# Patient Record
Sex: FEMALE | Race: WHITE | NOT HISPANIC OR LATINO | Employment: FULL TIME
[De-identification: names, ages, dates, MRNs, and addresses within clinical notes are randomized per-mention and may not be internally consistent; named-entity substitution may affect disease eponyms.]

---

## 2017-06-07 ENCOUNTER — RECORDS - HEALTHEAST (OUTPATIENT)
Dept: ADMINISTRATIVE | Facility: OTHER | Age: 52
End: 2017-06-07

## 2017-07-31 ENCOUNTER — OFFICE VISIT - HEALTHEAST (OUTPATIENT)
Dept: FAMILY MEDICINE | Facility: CLINIC | Age: 52
End: 2017-07-31

## 2017-07-31 DIAGNOSIS — F41.9 ANXIETY: ICD-10-CM

## 2017-07-31 DIAGNOSIS — K59.00 CONSTIPATION, UNSPECIFIED CONSTIPATION TYPE: ICD-10-CM

## 2017-07-31 DIAGNOSIS — K58.9 IRRITABLE BOWEL SYNDROME: ICD-10-CM

## 2017-07-31 ASSESSMENT — MIFFLIN-ST. JEOR: SCORE: 1721.1

## 2017-09-18 ENCOUNTER — OFFICE VISIT - HEALTHEAST (OUTPATIENT)
Dept: FAMILY MEDICINE | Facility: CLINIC | Age: 52
End: 2017-09-18

## 2017-09-18 DIAGNOSIS — E04.1 THYROID NODULE: ICD-10-CM

## 2017-09-18 DIAGNOSIS — Z23 NEED FOR IMMUNIZATION AGAINST INFLUENZA: ICD-10-CM

## 2017-09-18 DIAGNOSIS — R53.83 FATIGUE, UNSPECIFIED TYPE: ICD-10-CM

## 2017-09-18 DIAGNOSIS — Z12.31 VISIT FOR SCREENING MAMMOGRAM: ICD-10-CM

## 2017-09-18 ASSESSMENT — MIFFLIN-ST. JEOR: SCORE: 1721.1

## 2017-09-21 ENCOUNTER — COMMUNICATION - HEALTHEAST (OUTPATIENT)
Dept: FAMILY MEDICINE | Facility: CLINIC | Age: 52
End: 2017-09-21

## 2017-09-25 ENCOUNTER — COMMUNICATION - HEALTHEAST (OUTPATIENT)
Dept: FAMILY MEDICINE | Facility: CLINIC | Age: 52
End: 2017-09-25

## 2017-09-28 ENCOUNTER — HOSPITAL ENCOUNTER (OUTPATIENT)
Dept: MAMMOGRAPHY | Facility: CLINIC | Age: 52
Discharge: HOME | End: 2017-09-28
Attending: FAMILY MEDICINE

## 2017-09-28 DIAGNOSIS — Z12.31 VISIT FOR SCREENING MAMMOGRAM: ICD-10-CM

## 2017-10-17 ENCOUNTER — COMMUNICATION - HEALTHEAST (OUTPATIENT)
Dept: FAMILY MEDICINE | Facility: CLINIC | Age: 52
End: 2017-10-17

## 2017-11-15 ENCOUNTER — RECORDS - HEALTHEAST (OUTPATIENT)
Dept: ADMINISTRATIVE | Facility: OTHER | Age: 52
End: 2017-11-15

## 2018-01-03 ENCOUNTER — COMMUNICATION - HEALTHEAST (OUTPATIENT)
Dept: FAMILY MEDICINE | Facility: CLINIC | Age: 53
End: 2018-01-03

## 2018-01-11 ENCOUNTER — OFFICE VISIT - HEALTHEAST (OUTPATIENT)
Dept: ENDOCRINOLOGY | Facility: CLINIC | Age: 53
End: 2018-01-11

## 2018-01-11 DIAGNOSIS — E04.1 THYROID NODULE: ICD-10-CM

## 2018-01-11 LAB
CALCIUM SERPL-MCNC: 9.2 MG/DL (ref 8.5–10.5)
CHOLEST SERPL-MCNC: 222 MG/DL
CREAT SERPL-MCNC: 0.87 MG/DL (ref 0.6–1.1)
FASTING STATUS PATIENT QL REPORTED: YES
FASTING STATUS PATIENT QL REPORTED: YES
FSH SERPL-ACNC: 86.1 MIU/ML
GFR SERPL CREATININE-BSD FRML MDRD: >60 ML/MIN/1.73M2
GLUCOSE BLD-MCNC: 91 MG/DL (ref 70–125)
HBA1C MFR BLD: 5.3 % (ref 3.5–6)
HDLC SERPL-MCNC: 53 MG/DL
LDLC SERPL CALC-MCNC: 131 MG/DL
LH SERPL-ACNC: 51.8 MIU/ML
POTASSIUM BLD-SCNC: 4.7 MMOL/L (ref 3.5–5)
PTH-INTACT SERPL-MCNC: 83 PG/ML (ref 10–86)
T3 SERPL-MCNC: 88 NG/DL (ref 45–175)
T4 FREE SERPL-MCNC: 0.8 NG/DL (ref 0.7–1.8)
TRIGL SERPL-MCNC: 190 MG/DL
TSH SERPL DL<=0.005 MIU/L-ACNC: 1.58 UIU/ML (ref 0.3–5)

## 2018-01-11 ASSESSMENT — MIFFLIN-ST. JEOR: SCORE: 1741.97

## 2018-01-12 ENCOUNTER — HOSPITAL ENCOUNTER (OUTPATIENT)
Dept: ULTRASOUND IMAGING | Facility: HOSPITAL | Age: 53
Discharge: HOME | End: 2018-01-12
Attending: INTERNAL MEDICINE

## 2018-01-12 DIAGNOSIS — E04.1 THYROID NODULE: ICD-10-CM

## 2018-01-12 LAB
25(OH)D3 SERPL-MCNC: 36.4 NG/ML (ref 30–80)
25(OH)D3 SERPL-MCNC: 36.4 NG/ML (ref 30–80)

## 2018-01-15 ENCOUNTER — COMMUNICATION - HEALTHEAST (OUTPATIENT)
Dept: ENDOCRINOLOGY | Facility: CLINIC | Age: 53
End: 2018-01-15

## 2018-02-05 ENCOUNTER — COMMUNICATION - HEALTHEAST (OUTPATIENT)
Dept: FAMILY MEDICINE | Facility: CLINIC | Age: 53
End: 2018-02-05

## 2018-02-22 ENCOUNTER — RECORDS - HEALTHEAST (OUTPATIENT)
Dept: ADMINISTRATIVE | Facility: OTHER | Age: 53
End: 2018-02-22

## 2018-03-02 ENCOUNTER — RECORDS - HEALTHEAST (OUTPATIENT)
Dept: ADMINISTRATIVE | Facility: OTHER | Age: 53
End: 2018-03-02

## 2018-05-27 ENCOUNTER — RECORDS - HEALTHEAST (OUTPATIENT)
Dept: ADMINISTRATIVE | Facility: OTHER | Age: 53
End: 2018-05-27

## 2018-06-18 ENCOUNTER — COMMUNICATION - HEALTHEAST (OUTPATIENT)
Dept: FAMILY MEDICINE | Facility: CLINIC | Age: 53
End: 2018-06-18

## 2018-06-27 ENCOUNTER — COMMUNICATION - HEALTHEAST (OUTPATIENT)
Dept: LAB | Facility: CLINIC | Age: 53
End: 2018-06-27

## 2018-06-27 DIAGNOSIS — E04.9 GOITER: ICD-10-CM

## 2018-07-02 ENCOUNTER — AMBULATORY - HEALTHEAST (OUTPATIENT)
Dept: FAMILY MEDICINE | Facility: CLINIC | Age: 53
End: 2018-07-02

## 2018-07-02 DIAGNOSIS — E66.9 OBESITY: ICD-10-CM

## 2018-07-05 ENCOUNTER — AMBULATORY - HEALTHEAST (OUTPATIENT)
Dept: LAB | Facility: CLINIC | Age: 53
End: 2018-07-05

## 2018-07-05 DIAGNOSIS — E04.9 GOITER: ICD-10-CM

## 2018-07-05 LAB
25(OH)D3 SERPL-MCNC: 24.8 NG/ML (ref 30–80)
25(OH)D3 SERPL-MCNC: 24.8 NG/ML (ref 30–80)
CALCIUM SERPL-MCNC: 9.2 MG/DL (ref 8.5–10.5)
CREAT SERPL-MCNC: 0.9 MG/DL (ref 0.6–1.1)
GFR SERPL CREATININE-BSD FRML MDRD: >60 ML/MIN/1.73M2
HBA1C MFR BLD: 5.2 % (ref 3.5–6)
POTASSIUM BLD-SCNC: 4.4 MMOL/L (ref 3.5–5)
T4 FREE SERPL-MCNC: 0.8 NG/DL (ref 0.7–1.8)
TSH SERPL DL<=0.005 MIU/L-ACNC: 3.58 UIU/ML (ref 0.3–5)

## 2018-07-11 ENCOUNTER — COMMUNICATION - HEALTHEAST (OUTPATIENT)
Dept: FAMILY MEDICINE | Facility: CLINIC | Age: 53
End: 2018-07-11

## 2018-07-13 ENCOUNTER — OFFICE VISIT - HEALTHEAST (OUTPATIENT)
Dept: SURGERY | Facility: CLINIC | Age: 53
End: 2018-07-13

## 2018-07-13 DIAGNOSIS — Z71.3 DIETARY COUNSELING: ICD-10-CM

## 2018-07-13 DIAGNOSIS — E66.9 OBESITY (BMI 30-39.9): ICD-10-CM

## 2018-07-13 ASSESSMENT — MIFFLIN-ST. JEOR: SCORE: 1775.53

## 2018-07-16 ENCOUNTER — OFFICE VISIT - HEALTHEAST (OUTPATIENT)
Dept: FAMILY MEDICINE | Facility: CLINIC | Age: 53
End: 2018-07-16

## 2018-07-16 ENCOUNTER — COMMUNICATION - HEALTHEAST (OUTPATIENT)
Dept: FAMILY MEDICINE | Facility: CLINIC | Age: 53
End: 2018-07-16

## 2018-07-16 DIAGNOSIS — R53.83 FATIGUE, UNSPECIFIED TYPE: ICD-10-CM

## 2018-07-16 DIAGNOSIS — F51.01 PRIMARY INSOMNIA: ICD-10-CM

## 2018-07-16 DIAGNOSIS — F33.41 RECURRENT MAJOR DEPRESSIVE DISORDER, IN PARTIAL REMISSION (H): ICD-10-CM

## 2018-07-16 ASSESSMENT — MIFFLIN-ST. JEOR: SCORE: 1766.46

## 2018-07-17 ENCOUNTER — AMBULATORY - HEALTHEAST (OUTPATIENT)
Dept: FAMILY MEDICINE | Facility: CLINIC | Age: 53
End: 2018-07-17

## 2018-08-31 ENCOUNTER — OFFICE VISIT - HEALTHEAST (OUTPATIENT)
Dept: SURGERY | Facility: CLINIC | Age: 53
End: 2018-08-31

## 2018-08-31 DIAGNOSIS — Z71.3 DIETARY COUNSELING: ICD-10-CM

## 2018-08-31 DIAGNOSIS — E66.9 OBESITY (BMI 30-39.9): ICD-10-CM

## 2018-08-31 ASSESSMENT — MIFFLIN-ST. JEOR: SCORE: 1725.64

## 2018-09-12 ENCOUNTER — COMMUNICATION - HEALTHEAST (OUTPATIENT)
Dept: FAMILY MEDICINE | Facility: CLINIC | Age: 53
End: 2018-09-12

## 2018-10-02 ENCOUNTER — HOSPITAL ENCOUNTER (OUTPATIENT)
Dept: MAMMOGRAPHY | Facility: CLINIC | Age: 53
Discharge: HOME | End: 2018-10-02
Attending: FAMILY MEDICINE

## 2018-10-02 DIAGNOSIS — Z12.31 VISIT FOR SCREENING MAMMOGRAM: ICD-10-CM

## 2018-12-10 ENCOUNTER — COMMUNICATION - HEALTHEAST (OUTPATIENT)
Dept: SURGERY | Facility: CLINIC | Age: 53
End: 2018-12-10

## 2019-03-20 ENCOUNTER — RECORDS - HEALTHEAST (OUTPATIENT)
Dept: ADMINISTRATIVE | Facility: OTHER | Age: 54
End: 2019-03-20

## 2019-08-09 ENCOUNTER — RECORDS - HEALTHEAST (OUTPATIENT)
Dept: ADMINISTRATIVE | Facility: OTHER | Age: 54
End: 2019-08-09

## 2019-09-25 ENCOUNTER — OFFICE VISIT - HEALTHEAST (OUTPATIENT)
Dept: CARDIOLOGY | Facility: CLINIC | Age: 54
End: 2019-09-25

## 2019-09-25 DIAGNOSIS — M54.2 NECK PAIN: ICD-10-CM

## 2019-09-25 DIAGNOSIS — R60.0 LOCALIZED EDEMA: ICD-10-CM

## 2019-09-25 DIAGNOSIS — R03.0 ELEVATED BLOOD PRESSURE READING WITHOUT DIAGNOSIS OF HYPERTENSION: ICD-10-CM

## 2019-09-25 DIAGNOSIS — R68.84 JAW PAIN: ICD-10-CM

## 2019-09-25 LAB
ATRIAL RATE - MUSE: 73 BPM
DIASTOLIC BLOOD PRESSURE - MUSE: NORMAL
INTERPRETATION ECG - MUSE: NORMAL
P AXIS - MUSE: 14 DEGREES
PR INTERVAL - MUSE: 192 MS
QRS DURATION - MUSE: 94 MS
QT - MUSE: 406 MS
QTC - MUSE: 447 MS
R AXIS - MUSE: -1 DEGREES
SYSTOLIC BLOOD PRESSURE - MUSE: NORMAL
T AXIS - MUSE: 12 DEGREES
VENTRICULAR RATE- MUSE: 73 BPM

## 2019-09-25 ASSESSMENT — MIFFLIN-ST. JEOR: SCORE: 1642.29

## 2019-10-02 ENCOUNTER — RECORDS - HEALTHEAST (OUTPATIENT)
Dept: CARDIOLOGY | Facility: CLINIC | Age: 54
End: 2019-10-02

## 2019-10-03 ENCOUNTER — AMBULATORY - HEALTHEAST (OUTPATIENT)
Dept: LAB | Facility: HOSPITAL | Age: 54
End: 2019-10-03

## 2019-10-03 DIAGNOSIS — R68.84 JAW PAIN: ICD-10-CM

## 2019-10-03 DIAGNOSIS — M54.2 NECK PAIN: ICD-10-CM

## 2019-10-03 DIAGNOSIS — R60.0 LOCALIZED EDEMA: ICD-10-CM

## 2019-10-03 DIAGNOSIS — M54.2 CERVICALGIA: ICD-10-CM

## 2019-10-03 DIAGNOSIS — R03.0 ELEVATED BLOOD PRESSURE READING WITHOUT DIAGNOSIS OF HYPERTENSION: ICD-10-CM

## 2019-10-03 LAB
ALBUMIN SERPL-MCNC: 4 G/DL (ref 3.5–5)
ALP SERPL-CCNC: 97 U/L (ref 45–120)
ALT SERPL W P-5'-P-CCNC: 26 U/L (ref 0–45)
ANION GAP SERPL CALCULATED.3IONS-SCNC: 8 MMOL/L (ref 5–18)
AST SERPL W P-5'-P-CCNC: 20 U/L (ref 0–40)
BILIRUB SERPL-MCNC: 0.5 MG/DL (ref 0–1)
BUN SERPL-MCNC: 26 MG/DL (ref 8–22)
CALCIUM SERPL-MCNC: 9.4 MG/DL (ref 8.5–10.5)
CHLORIDE BLD-SCNC: 105 MMOL/L (ref 98–107)
CHOLEST SERPL-MCNC: 191 MG/DL
CO2 SERPL-SCNC: 28 MMOL/L (ref 22–31)
CREAT SERPL-MCNC: 0.98 MG/DL (ref 0.6–1.1)
ERYTHROCYTE [DISTWIDTH] IN BLOOD BY AUTOMATED COUNT: 12.9 % (ref 11–14.5)
FASTING STATUS PATIENT QL REPORTED: YES
GFR SERPL CREATININE-BSD FRML MDRD: 59 ML/MIN/1.73M2
GLUCOSE BLD-MCNC: 106 MG/DL (ref 70–125)
HCT VFR BLD AUTO: 45.1 % (ref 35–47)
HDLC SERPL-MCNC: 50 MG/DL
HGB BLD-MCNC: 15.5 G/DL (ref 12–16)
LDLC SERPL CALC-MCNC: 105 MG/DL
MCH RBC QN AUTO: 31.7 PG (ref 27–34)
MCHC RBC AUTO-ENTMCNC: 34.4 G/DL (ref 32–36)
MCV RBC AUTO: 92 FL (ref 80–100)
PLATELET # BLD AUTO: 227 THOU/UL (ref 140–440)
PMV BLD AUTO: 9.5 FL (ref 8.5–12.5)
POTASSIUM BLD-SCNC: 4.4 MMOL/L (ref 3.5–5)
PROT SERPL-MCNC: 7 G/DL (ref 6–8)
RBC # BLD AUTO: 4.89 MILL/UL (ref 3.8–5.4)
SODIUM SERPL-SCNC: 141 MMOL/L (ref 136–145)
TRIGL SERPL-MCNC: 178 MG/DL
TSH SERPL DL<=0.005 MIU/L-ACNC: 1.98 UIU/ML (ref 0.3–5)
WBC: 5.1 THOU/UL (ref 4–11)

## 2019-10-15 ENCOUNTER — COMMUNICATION - HEALTHEAST (OUTPATIENT)
Dept: CARDIOLOGY | Facility: CLINIC | Age: 54
End: 2019-10-15

## 2019-10-16 ENCOUNTER — OFFICE VISIT - HEALTHEAST (OUTPATIENT)
Dept: FAMILY MEDICINE | Facility: CLINIC | Age: 54
End: 2019-10-16

## 2019-10-16 DIAGNOSIS — R22.1 NECK SWELLING: ICD-10-CM

## 2019-10-16 DIAGNOSIS — Z56.6 STRESS AT WORK: ICD-10-CM

## 2019-10-16 DIAGNOSIS — Z23 NEED FOR IMMUNIZATION AGAINST INFLUENZA: ICD-10-CM

## 2019-10-16 DIAGNOSIS — R00.2 PALPITATIONS: ICD-10-CM

## 2019-10-16 RX ORDER — PROPRANOLOL HYDROCHLORIDE 10 MG/1
10 TABLET ORAL 3 TIMES DAILY
Qty: 30 TABLET | Refills: 2 | Status: SHIPPED | OUTPATIENT
Start: 2019-10-16 | End: 2021-07-29

## 2019-10-16 SDOH — HEALTH STABILITY - MENTAL HEALTH: OTHER PHYSICAL AND MENTAL STRAIN RELATED TO WORK: Z56.6

## 2019-10-16 ASSESSMENT — MIFFLIN-ST. JEOR: SCORE: 1642.29

## 2019-10-17 ENCOUNTER — HOSPITAL ENCOUNTER (OUTPATIENT)
Dept: ULTRASOUND IMAGING | Facility: HOSPITAL | Age: 54
Discharge: HOME | End: 2019-10-17
Attending: FAMILY MEDICINE

## 2019-10-17 DIAGNOSIS — R22.1 NECK SWELLING: ICD-10-CM

## 2019-10-18 ENCOUNTER — COMMUNICATION - HEALTHEAST (OUTPATIENT)
Dept: FAMILY MEDICINE | Facility: CLINIC | Age: 54
End: 2019-10-18

## 2019-10-18 DIAGNOSIS — R22.1 NECK SWELLING: ICD-10-CM

## 2019-10-18 DIAGNOSIS — R22.0 SWELLING OF LEFT SIDE OF FACE: ICD-10-CM

## 2019-10-21 ENCOUNTER — OFFICE VISIT - HEALTHEAST (OUTPATIENT)
Dept: OTOLARYNGOLOGY | Facility: CLINIC | Age: 54
End: 2019-10-21

## 2019-10-21 DIAGNOSIS — R22.1 NECK MASS: ICD-10-CM

## 2019-10-24 ENCOUNTER — COMMUNICATION - HEALTHEAST (OUTPATIENT)
Dept: CARDIOLOGY | Facility: CLINIC | Age: 54
End: 2019-10-24

## 2019-10-28 ENCOUNTER — COMMUNICATION - HEALTHEAST (OUTPATIENT)
Dept: SURGERY | Facility: CLINIC | Age: 54
End: 2019-10-28

## 2019-10-31 ENCOUNTER — COMMUNICATION - HEALTHEAST (OUTPATIENT)
Dept: OTOLARYNGOLOGY | Facility: CLINIC | Age: 54
End: 2019-10-31

## 2019-11-04 ENCOUNTER — RECORDS - HEALTHEAST (OUTPATIENT)
Dept: ADMINISTRATIVE | Facility: OTHER | Age: 54
End: 2019-11-04

## 2019-11-04 ENCOUNTER — OFFICE VISIT - HEALTHEAST (OUTPATIENT)
Dept: OTOLARYNGOLOGY | Facility: CLINIC | Age: 54
End: 2019-11-04

## 2019-11-04 DIAGNOSIS — E07.9 LESION OF THYROID GLAND: ICD-10-CM

## 2019-11-14 ENCOUNTER — COMMUNICATION - HEALTHEAST (OUTPATIENT)
Dept: OTOLARYNGOLOGY | Facility: CLINIC | Age: 54
End: 2019-11-14

## 2019-11-15 ENCOUNTER — COMMUNICATION - HEALTHEAST (OUTPATIENT)
Dept: SURGERY | Facility: CLINIC | Age: 54
End: 2019-11-15

## 2019-11-18 ENCOUNTER — OFFICE VISIT - HEALTHEAST (OUTPATIENT)
Dept: FAMILY MEDICINE | Facility: CLINIC | Age: 54
End: 2019-11-18

## 2019-11-18 ENCOUNTER — COMMUNICATION - HEALTHEAST (OUTPATIENT)
Dept: FAMILY MEDICINE | Facility: CLINIC | Age: 54
End: 2019-11-18

## 2019-11-18 DIAGNOSIS — Z01.818 PRE-OP EXAM: ICD-10-CM

## 2019-11-18 ASSESSMENT — PATIENT HEALTH QUESTIONNAIRE - PHQ9: SUM OF ALL RESPONSES TO PHQ QUESTIONS 1-9: 0

## 2019-11-18 ASSESSMENT — MIFFLIN-ST. JEOR: SCORE: 1628.69

## 2019-11-19 ENCOUNTER — COMMUNICATION - HEALTHEAST (OUTPATIENT)
Dept: CARDIOLOGY | Facility: CLINIC | Age: 54
End: 2019-11-19

## 2019-11-19 ENCOUNTER — COMMUNICATION - HEALTHEAST (OUTPATIENT)
Dept: FAMILY MEDICINE | Facility: CLINIC | Age: 54
End: 2019-11-19

## 2019-11-19 ENCOUNTER — COMMUNICATION - HEALTHEAST (OUTPATIENT)
Dept: SCHEDULING | Facility: CLINIC | Age: 54
End: 2019-11-19

## 2019-11-19 DIAGNOSIS — M54.2 NECK PAIN: ICD-10-CM

## 2019-11-19 DIAGNOSIS — R03.0 ELEVATED BLOOD PRESSURE READING WITHOUT DIAGNOSIS OF HYPERTENSION: ICD-10-CM

## 2019-11-19 DIAGNOSIS — R68.84 JAW PAIN: ICD-10-CM

## 2019-11-20 ENCOUNTER — HOSPITAL ENCOUNTER (OUTPATIENT)
Dept: CARDIOLOGY | Facility: HOSPITAL | Age: 54
Discharge: HOME | End: 2019-11-20
Attending: INTERNAL MEDICINE

## 2019-11-20 DIAGNOSIS — M54.2 NECK PAIN: ICD-10-CM

## 2019-11-20 DIAGNOSIS — R03.0 ELEVATED BLOOD PRESSURE READING WITHOUT DIAGNOSIS OF HYPERTENSION: ICD-10-CM

## 2019-11-20 DIAGNOSIS — R68.84 JAW PAIN: ICD-10-CM

## 2019-11-20 LAB
CV STRESS CURRENT BP HE: NORMAL
CV STRESS CURRENT HR HE: 101
CV STRESS CURRENT HR HE: 102
CV STRESS CURRENT HR HE: 104
CV STRESS CURRENT HR HE: 105
CV STRESS CURRENT HR HE: 110
CV STRESS CURRENT HR HE: 110
CV STRESS CURRENT HR HE: 115
CV STRESS CURRENT HR HE: 121
CV STRESS CURRENT HR HE: 122
CV STRESS CURRENT HR HE: 124
CV STRESS CURRENT HR HE: 132
CV STRESS CURRENT HR HE: 138
CV STRESS CURRENT HR HE: 138
CV STRESS CURRENT HR HE: 140
CV STRESS CURRENT HR HE: 77
CV STRESS CURRENT HR HE: 88
CV STRESS CURRENT HR HE: 93
CV STRESS CURRENT HR HE: 96
CV STRESS CURRENT HR HE: 96
CV STRESS CURRENT HR HE: 97
CV STRESS CURRENT HR HE: 98
CV STRESS CURRENT HR HE: 98
CV STRESS CURRENT HR HE: 99
CV STRESS CURRENT HR HE: 99
CV STRESS DEVIATION TIME HE: NORMAL
CV STRESS ECHO PERCENT HR HE: NORMAL
CV STRESS EXERCISE STAGE HE: NORMAL
CV STRESS EXERCISE STAGE REACHED HE: NORMAL
CV STRESS FINAL RESTING BP HE: NORMAL
CV STRESS FINAL RESTING HR HE: 98
CV STRESS MAX HR HE: 140
CV STRESS MAX TREADMILL GRADE HE: 16
CV STRESS MAX TREADMILL SPEED HE: 4.2
CV STRESS PEAK DIA BP HE: NORMAL
CV STRESS PEAK SYS BP HE: NORMAL
CV STRESS PHASE HE: NORMAL
CV STRESS PROTOCOL HE: NORMAL
CV STRESS RESTING PT POSITION HE: NORMAL
CV STRESS ST DEVIATION AMOUNT HE: NORMAL
CV STRESS ST DEVIATION ELEVATION HE: NORMAL
CV STRESS ST EVELATION AMOUNT HE: NORMAL
CV STRESS TEST TYPE HE: NORMAL
CV STRESS TOTAL STAGE TIME MIN 1 HE: NORMAL
RATE PRESSURE PRODUCT: NORMAL
STRESS ECHO BASELINE DIASTOLIC HE: 80
STRESS ECHO BASELINE HR: 75
STRESS ECHO BASELINE SYSTOLIC BP: 128
STRESS ECHO CALCULATED PERCENT HR: 85 %
STRESS ECHO LAST STRESS DIASTOLIC BP: 74
STRESS ECHO LAST STRESS HR: 138
STRESS ECHO LAST STRESS SYSTOLIC BP: 142
STRESS ECHO POST ESTIMATED WORKLOAD: 10.5
STRESS ECHO POST EXERCISE DUR MIN: 9
STRESS ECHO POST EXERCISE DUR SEC: 3
STRESS ECHO TARGET HR: 164

## 2019-11-21 ENCOUNTER — COMMUNICATION - HEALTHEAST (OUTPATIENT)
Dept: CARDIOLOGY | Facility: CLINIC | Age: 54
End: 2019-11-21

## 2019-11-27 ENCOUNTER — OFFICE VISIT - HEALTHEAST (OUTPATIENT)
Dept: FAMILY MEDICINE | Facility: CLINIC | Age: 54
End: 2019-11-27

## 2019-11-27 DIAGNOSIS — E66.9 OBESITY, UNSPECIFIED CLASSIFICATION, UNSPECIFIED OBESITY TYPE, UNSPECIFIED WHETHER SERIOUS COMORBIDITY PRESENT: ICD-10-CM

## 2019-11-27 DIAGNOSIS — Z01.818 PRE-OP EXAM: ICD-10-CM

## 2019-11-27 DIAGNOSIS — Z13.9 SCREENING FOR CONDITION: ICD-10-CM

## 2019-11-27 DIAGNOSIS — E04.1 THYROID NODULE: ICD-10-CM

## 2019-11-27 LAB
ANION GAP SERPL CALCULATED.3IONS-SCNC: 12 MMOL/L (ref 5–18)
BUN SERPL-MCNC: 23 MG/DL (ref 8–22)
CALCIUM SERPL-MCNC: 9.5 MG/DL (ref 8.5–10.5)
CHLORIDE BLD-SCNC: 106 MMOL/L (ref 98–107)
CO2 SERPL-SCNC: 23 MMOL/L (ref 22–31)
CREAT SERPL-MCNC: 0.85 MG/DL (ref 0.6–1.1)
ERYTHROCYTE [DISTWIDTH] IN BLOOD BY AUTOMATED COUNT: 10.3 % (ref 11–14.5)
GFR SERPL CREATININE-BSD FRML MDRD: >60 ML/MIN/1.73M2
GLUCOSE BLD-MCNC: 80 MG/DL (ref 70–125)
HCT VFR BLD AUTO: 45.7 % (ref 35–47)
HGB BLD-MCNC: 15.2 G/DL (ref 12–16)
HIV 1+2 AB+HIV1 P24 AG SERPL QL IA: NEGATIVE
MCH RBC QN AUTO: 32.2 PG (ref 27–34)
MCHC RBC AUTO-ENTMCNC: 33.2 G/DL (ref 32–36)
MCV RBC AUTO: 97 FL (ref 80–100)
PLATELET # BLD AUTO: 242 THOU/UL (ref 140–440)
PMV BLD AUTO: 7.7 FL (ref 7–10)
POTASSIUM BLD-SCNC: 4.1 MMOL/L (ref 3.5–5)
RBC # BLD AUTO: 4.71 MILL/UL (ref 3.8–5.4)
SODIUM SERPL-SCNC: 141 MMOL/L (ref 136–145)
WBC: 7.7 THOU/UL (ref 4–11)

## 2019-11-28 LAB — HCV AB SERPL QL IA: NEGATIVE

## 2019-11-29 ENCOUNTER — COMMUNICATION - HEALTHEAST (OUTPATIENT)
Dept: FAMILY MEDICINE | Facility: CLINIC | Age: 54
End: 2019-11-29

## 2019-12-17 ENCOUNTER — SURGERY - HEALTHEAST (OUTPATIENT)
Dept: SURGERY | Facility: CLINIC | Age: 54
End: 2019-12-17

## 2019-12-17 ENCOUNTER — ANESTHESIA - HEALTHEAST (OUTPATIENT)
Dept: SURGERY | Facility: CLINIC | Age: 54
End: 2019-12-17

## 2019-12-17 ASSESSMENT — MIFFLIN-ST. JEOR: SCORE: 1624.15

## 2019-12-30 ENCOUNTER — OFFICE VISIT - HEALTHEAST (OUTPATIENT)
Dept: OTOLARYNGOLOGY | Facility: CLINIC | Age: 54
End: 2019-12-30

## 2019-12-30 DIAGNOSIS — E89.0 S/P PARTIAL THYROIDECTOMY: ICD-10-CM

## 2020-01-28 ENCOUNTER — HOSPITAL ENCOUNTER (OUTPATIENT)
Dept: MAMMOGRAPHY | Facility: CLINIC | Age: 55
Discharge: HOME | End: 2020-01-28
Attending: FAMILY MEDICINE

## 2020-01-28 DIAGNOSIS — Z12.31 VISIT FOR SCREENING MAMMOGRAM: ICD-10-CM

## 2020-07-23 ENCOUNTER — TRANSFERRED RECORDS (OUTPATIENT)
Dept: HEALTH INFORMATION MANAGEMENT | Facility: CLINIC | Age: 55
End: 2020-07-23

## 2020-07-24 ENCOUNTER — TRANSFERRED RECORDS (OUTPATIENT)
Dept: HEALTH INFORMATION MANAGEMENT | Facility: CLINIC | Age: 55
End: 2020-07-24

## 2020-08-07 ENCOUNTER — COMMUNICATION - HEALTHEAST (OUTPATIENT)
Dept: SCHEDULING | Facility: CLINIC | Age: 55
End: 2020-08-07

## 2020-10-01 ENCOUNTER — COMMUNICATION - HEALTHEAST (OUTPATIENT)
Dept: FAMILY MEDICINE | Facility: CLINIC | Age: 55
End: 2020-10-01

## 2020-10-02 ENCOUNTER — OFFICE VISIT - HEALTHEAST (OUTPATIENT)
Dept: FAMILY MEDICINE | Facility: CLINIC | Age: 55
End: 2020-10-02

## 2020-10-02 DIAGNOSIS — Z23 NEED FOR VACCINATION: ICD-10-CM

## 2020-10-02 DIAGNOSIS — K58.2 IRRITABLE BOWEL SYNDROME WITH BOTH CONSTIPATION AND DIARRHEA: ICD-10-CM

## 2020-10-02 ASSESSMENT — MIFFLIN-ST. JEOR: SCORE: 1692.19

## 2020-10-06 ENCOUNTER — COMMUNICATION - HEALTHEAST (OUTPATIENT)
Dept: FAMILY MEDICINE | Facility: CLINIC | Age: 55
End: 2020-10-06

## 2020-10-10 RX ORDER — POLYETHYLENE GLYCOL 3350 17 G/17G
12 POWDER, FOR SOLUTION ORAL DAILY
Refills: 0 | Status: SHIPPED | COMMUNITY
Start: 2020-10-10 | End: 2021-07-29

## 2020-11-09 ENCOUNTER — VIRTUAL VISIT (OUTPATIENT)
Dept: FAMILY MEDICINE | Facility: OTHER | Age: 55
End: 2020-11-09
Payer: COMMERCIAL

## 2020-11-09 NOTE — PROGRESS NOTES
"Date: 2020 09:32:46  Clinician: Johnny Telles  Clinician NPI: [id number]  Patient: Brenna Fournier  Patient : 1965  Patient Address: [address] [place] [address]  Patient Phone: [phone]  Visit Protocol: URI  Patient Summary:  Brenna is a 55 year old ( : 1965 ) female who initiated a [facility] Visit for COVID-19 (Coronavirus) evaluation and screening. When asked the question \"Please sign me up to receive news, health information and promotions from [facility].\", Brenna responded \"No\".    When asked when her symptoms started, Brenna reported that she does not have any symptoms.   She denies taking antibiotic medication in the past month and having recent facial or sinus surgery in the past 60 days.    Pertinent COVID-19 (Coronavirus) information  Brenna does not work or volunteer as healthcare worker or a . In the past 14 days, Brenna has not worked or volunteered at a healthcare facility or group living setting.   In the past 14 days, she also has not lived in a congregate living setting.   Brenna has had a close contact with a laboratory-confirmed COVID-19 patient in the last 14 days. She was exposed at her work. Date Brenna was exposed to the laboratory-confirmed COVID-19 patient: 2020   Additional information about contact with COVID-19 (Coronavirus) patient as reported by the patient (free text): 1 coworker during a closed door meeting with no masks.   Brenna is not living in the same household with the COVID-19 positive patient. She was in an enclosed space for greater than 15 minutes with the COVID-19 patient.   During the encounter, neither were wearing masks.   Since 2019, Brenna has not been tested for COVID-19 and has not had upper respiratory infection or influenza-like illness.   Pertinent medical history  Brenna does not get yeast infections when she takes antibiotics.   Brenna needs a return to work/school note.   Weight: 215 lbs   Brenna smokes or uses " smokeless tobacco.   Weight: 215 lbs    MEDICATIONS: No current medications, ALLERGIES: NKDA  Clinician Response:  Dear Brenna,   Based on your exposure to COVID-19 (coronavirus), we would like to test you for this virus.  1. Please call [phone] to schedule your visit. Explain that you were referred by [facility] to have a COVID-19 test. Be ready to share your [facility] visit ID number.  * If you need to schedule in [facility] please call [phone] or for [employer] employees please call [phone].   * If you need to schedule in the [place] area please call [phone]. [employer] employees call [phone].   The following will serve as your written order for this COVID Test, ordered by me, for the indication of suspected COVID [Z20.828]: The test will be ordered in [employer], our electronic health record, after you are scheduled. It will show as ordered and authorized by Rishi Carrasquillo MD.  Order: COVID-19 (coronavirus) PCR for ASYMPTOMATIC EXPOSURE testing from [facility].   If you know you have had close contact with someone who tested positive, you should be quarantined for 14 days after this exposure. You should stay in quarantine for the14 days even if the covid test is negative, the optimal time to test after exposure is 5-7 days from the exposure  Quarantine means   What should I do?  For safety, it's very important to follow these rules. Do this for 14 days after the date you were last exposed to the virus..  Stay home and away from others. Don't go to school or anywhere else. Generally quarantine means staying home from work but there are some exceptions to this. Please contact your workplace.   No hugging, kissing or shaking hands.  Don't let anyone visit.  Cover your mouth and nose with a mask, tissue or washcloth to avoid spreading germs.  Wash your hands and face often. Use soap and water.  What are the symptoms of COVID-19?  The most common symptoms are cough, fever and trouble breathing. Less common symptoms  include headache, body aches, fatigue (feeling very tired), chills, sore throat, stuffy or runny nose, diarrhea (loose poop), loss of taste or smell, belly pain, and nausea or vomiting (feeling sick to your stomach or throwing up).  After 14 days, if you have still don't have symptoms, you likely don't have this virus.  If you develop symptoms, follow these guidelines.  If you're normally healthy: Please start another [facility] visit to report your symptoms. Go to [email or web address].  If you have a serious health problem (like cancer, heart failure, an organ transplant or kidney disease): Call your specialty clinic. Let them know that you might have COVID-19.  2. When it's time for your COVID test:  Stay at least 6 feet away from others. (If someone will drive you to your test, stay in the backseat, as far away from the  as you can.)  Cover your mouth and nose with a mask, tissue or washcloth.  Go straight to the testing site. Don't make any stops on the way there or back.  Please note  Caregivers in these groups are at risk for severe illness due to COVID-19:  o People 65 years and older  o People who live in a nursing home or long-term care facility  o People with chronic disease (lung, heart, cancer, diabetes, kidney, liver, immunologic)  o People who have a weakened immune system, including those who:  Are in cancer treatment  Take medicine that weakens the immune system, such as corticosteroids  Had a bone marrow or organ transplant  Have an immune deficiency  Have poorly controlled HIV or AIDS  Are obese (body mass index of 40 or higher)  Smoke regularly  Where can I get more information?   [facility] -- About COVID-19: [email or web address]  CDC -- What to Do If You're Sick: www.cdc.gov/coronavirus/2019-ncov/about/steps-when-sick.html  CDC -- Ending Home Isolation: www.cdc.gov/coronavirus/2019-ncov/hcp/disposition-in-home-patients.html  CDC -- Caring for Someone:  www.cdc.gov/coronavirus/2019-ncov/if-you-are-sick/care-for-someone.html  [facility] -- Interim Guidance for Hospital Discharge to Home: www.health.[facility].[place].us/diseases/coronavirus/hcp/hospdischarge.pdf  [facility] clinical trials (COVID-19 research studies): clinicalaffairs.[facility]/[employer]-clinical-trials  Below are the COVID-19 hotlines at the [place] Department of Health ([facility]). Interpreters are available.  For health questions: Call 168-859-6542 or 1-273.864.7928 (7 a.m. to 7 p.m.)  For questions about schools and childcare: Call 515-929-8853 or 1-162.556.9747 (7 a.m. to 7 p.m.)    Diagnosis: Contact with and (suspected) exposure to other viral communicable diseases  Diagnosis ICD: Z20.828  Additional Clinician Notes:   Please see your regular doctor for any return to work or school documentation once COVID testing is complete and results are available.

## 2021-02-04 ENCOUNTER — RECORDS - HEALTHEAST (OUTPATIENT)
Dept: ADMINISTRATIVE | Facility: OTHER | Age: 56
End: 2021-02-04

## 2021-04-05 ENCOUNTER — AMBULATORY - HEALTHEAST (OUTPATIENT)
Dept: NURSING | Facility: CLINIC | Age: 56
End: 2021-04-05

## 2021-04-13 ENCOUNTER — HOSPITAL ENCOUNTER (OUTPATIENT)
Dept: MAMMOGRAPHY | Facility: CLINIC | Age: 56
Discharge: HOME | End: 2021-04-13
Attending: FAMILY MEDICINE

## 2021-04-13 DIAGNOSIS — Z12.31 VISIT FOR SCREENING MAMMOGRAM: ICD-10-CM

## 2021-04-26 ENCOUNTER — AMBULATORY - HEALTHEAST (OUTPATIENT)
Dept: NURSING | Facility: CLINIC | Age: 56
End: 2021-04-26

## 2021-05-26 ASSESSMENT — PATIENT HEALTH QUESTIONNAIRE - PHQ9: SUM OF ALL RESPONSES TO PHQ QUESTIONS 1-9: 0

## 2021-05-31 VITALS — BODY MASS INDEX: 36.37 KG/M2 | HEIGHT: 68 IN | WEIGHT: 240 LBS

## 2021-05-31 VITALS — BODY MASS INDEX: 37.07 KG/M2 | HEIGHT: 68 IN | WEIGHT: 244.6 LBS

## 2021-06-01 VITALS — HEIGHT: 68 IN | WEIGHT: 241 LBS | BODY MASS INDEX: 36.53 KG/M2

## 2021-06-01 VITALS — HEIGHT: 68 IN | WEIGHT: 252 LBS | BODY MASS INDEX: 38.19 KG/M2

## 2021-06-01 VITALS — BODY MASS INDEX: 37.89 KG/M2 | HEIGHT: 68 IN | WEIGHT: 250 LBS

## 2021-06-01 NOTE — PATIENT INSTRUCTIONS - HE
Brenna Fournier,    It was a pleasure to see you today at the [facility].     My recommendations after this visit include:    1.  We will obtain a stress echocardiogram to evaluate your cardiac situation, your jaw and neck discomfort.    2.  Please take a baby aspirin each day or a full aspirin each day until we get this evaluation completed.    3.  We will also order a fasting lipid panel and some screening laboratories when you come in for your stress echocardiogram.    4.  We will call you with the results of the above testing and further recommendations but would want to see you back to discuss the results and opportunities to reduce your risk of cardiac events in the future.      Nader Guillen

## 2021-06-01 NOTE — TELEPHONE ENCOUNTER
----- Message from Joann Gruber sent at 10/2/2019  9:30 AM CDT -----  General phone call:    Caller: Pt  Primary cardiologist: Wilmer  Detailed reason for call: Pt would like to discuss insurance concerns over Stress Echo ordered by MALLORIE  New or active symptoms?   Best phone number: [phone]  Best time to contact:   Ok to leave a detailed message?   Device? No    Additional Info:

## 2021-06-02 NOTE — TELEPHONE ENCOUNTER
----- Message -----  From: Maru Lan RN  Sent: 10/24/2019   3:29 PM CDT  To: Nader Guillen MD    ----- Message from Maru Lan RN sent at 10/24/2019  3:29 PM CDT -----  Dr. Guillen,    The appeal we submitted to [facility], on 10/16/19 to cover ordered Stress Echo was denied. I received notification this afternoon.    Maru

## 2021-06-02 NOTE — PROGRESS NOTES
HISTORY OF PRESENT ILLNESS  Asked to see by Dr. Manuel for evaluation of neck mass. Patient comes in for evaluation of a neck mass. Patient reports history of neck swelling. She explains that she has had her thyroid checked in the past and it was not felt to be the issue. She reports discomfort along the left side of her neck. No redness. No fever, sweats or chills.     REVIEW OF SYSTEMS  Review of Systems: a 10-system review was performed. Pertinent positives are noted in the HPI and on a separate scanned document in the chart.    PMH, PSH, FH and SH has documented in the EHR.      EXAM    CONSTITUTIONAL  General Appearance:  Normal, well developed, well nourished, no obvious distress  Ability to Communicate:  communicates appropriately.    HEAD AND FACE  Appearance and Symmetry:  Normal, no scalp or facial scarring or suspicious lesions.  Paranasal sinuses tenderness:  Normal, Paranasal sinuses non tender    EARS  Clinical speech reception threshold:  Normal, able to hear normal speech.  Auricle:  Normal, Auricles without scars, lesions, masses.  External auditory canal:  Normal, External auditory canal normal.  Tympanic membrane:  Normal, Tympanic membranes normal without swelling or erythema.  Tympanic membrane mobility:  Normal, Normal tympanic membrane mobility.    NOSE (speculum or scope)  Architecture:  Normal, Grossly normal external nasal architecture with no masses or lesions.  Mucosa:  Normal mucosa, No polyps or masses.  Septum:  Normal, Septum non-obstructing.  Turbinates:  Normal, No turbinate abnormalities    ORAL CAVITY AND OROPHARYNX  Lips:  Normal.  Dental and gingiva:  Normal, No obvious dental or gingival disease.  Mucosa:  Normal, Moist mucous membranes.  Tongue:  Normal, Tongue mobile with no mucosal abnormalities  Hard and soft palate:  Normal, Hard and soft palate without cleft or mucosal lesions.  Oral pharynx:  Normal, Posterior pharynx without lesions or remarkable asymmetry.  Saliva:   Normal, Clear saliva.  Masses:  Normal, No palpable masses or pathologically enlarged lymph nodes.    NECK  Masses/lymph nodes:  Normal, No worrisome neck masses or lymph nodes.  Salivary glands:  Normal, Parotid and submandibular glands.  Trachea and larynx position:  Normal, Trachea and larynx midline.  Thyroid:  The swelling appears to involve the thyroid.  Tenderness:  Normal, No cervical tenderness.  Suppleness:  Normal, Neck supple    NEUROLOGICAL  Speech pattern:  Normal, Proasaic    RESPIRATORY  Symmetry and Respiratory effort:  Normal, Symmetric chest movement and expansion with no increased intercostal retractions or use of accessory muscles.       ULTRASOUND THYROID  Cystic nodule left.    IMPRESSION  Left neck swelling. It appears that we are dealing with a thyroid nodule/mass.     RECOMMENDATION  Given the history and confusion regarding the neck swelling, I advised a CT soft tissue neck.   .     Santosh Doe MD

## 2021-06-02 NOTE — TELEPHONE ENCOUNTER
Provider response below relayed to caller. Message understood. Patient would appreciate call from specialty schedulers within 1-3 days if possible. If not, patient will call back to clinic. Thanks!

## 2021-06-02 NOTE — TELEPHONE ENCOUNTER
----- Message from Nader Guillen MD sent at 10/23/2019  5:24 PM CDT -----  Maru,  Patient has hypertension plus chest discomfort.  Stress echocardiogram is warranted because it will also give us evidence of structural heart issues and left ventricular wall thickness.  Routine echocardiogram is considered quite acceptable just in the setting of hypertension.  If we order a regular stress test and a routine echocardiogram we will get less information and will be more expensive.  [employer] is being unreasonable and I would like to appeal  ----- Message -----  From: Maru Lan, RN  Sent: 10/7/2019   8:55 AM CDT  To: MD Dr. Wilmer Hill,     [employer] [place] denied Stress Echo for patient. As far as I know, we were not notified of this. Patient reports she received notification that she did not meet 4 certain criteria according to [employer]. She states that they would pay for treadmill test I believe. How would you like to proceed?    Maru  ----- Message -----  From: Nader Guillen MD  Sent: 10/6/2019   3:25 PM CDT  To: Maru Lan, RN    Maru,    Labs OK. Will decide about statins after stress echo.    Thanks,    Nader

## 2021-06-02 NOTE — TELEPHONE ENCOUNTER
Please call Brenna.  1) let her know the u/s showed that her thyroid enlargement found 2 years ago is the same now. So, her thyroid is fine - nothing needed. Good news.  2) I hope she is feeling slightly better in her neck and face. If she has not tried ibuprofen, to try to help the swelling, she should try this.  3) there is a referral for an urgent appointment with ENT. She can call ENT to inquire about an appointment (set an appointment but perhaps also be on a waiting list to get in sooner).  *Dr. Manuel is not in clinic today. However, if Brenna cannot be seen by ENT within 10 days, she could call us back. Either Dr. Manuel next week or possibly Dr. Denton could call and speak with one of the ENT docs to see what they recommend she do in the meantime and possibly they would get her in sooner.

## 2021-06-02 NOTE — PROGRESS NOTES
"OFFICE VISIT NOTE  Assessment & Plan   Brenna Fournier is a 54 y.o. female presents with a recurrence of neck swelling - she says it is not her thyroid, but it is not clear what it is? I question a thyroglossal duct but I see no obvious exit point plus it extends up to her jaw/base of the left ear. Will evaluate it closely with a thyroid us (last one done 1 year 9 months ago). Recent labs (10/3) show normal TSH and normal CBC, thus I do not think I need to start antibiotics or other thyroid meds now.  If the u/s shows a mass that looks like infection, will add antibiotics. F/u according to u/s results.  Addendum - u/s showed thyroid to be the same as 2 years ago. Will refer urgently to ENT for further eval.    She'll try propranolol prn palpitations. Recent EKG was normal. She has had a lot of stress at work.     Diagnoses and all orders for this visit:    Neck swelling  -     US Thyroid; Future; Expected date: 10/16/2019    Need for immunization against influenza  -     Influenza, Recombinant, Inj, Quadrivalent, PF, 18+YRS    Palpitations  -     propranolol (INDERAL) 10 MG tablet; Take 1 tablet (10 mg total) by mouth 3 (three) times a day.  Dispense: 30 tablet; Refill: 2    Stress at work              Subjective:   Chief Complaint:  Neck pain (Lump on throat it is painful, also had cardiology appt referred for Echo insurance does not cover. ) and Palpitations (heart races at night mainly, has been seen by a cardiologists )    54 y.o. female.    1) Left side of her face/jaw and neck has been swollen and painful. The swelling has been pushing on her throat. Very uncomfortable. Not taking any medication. It was swollen about two years ago and was aspirated. \"they got a lot of junk out of it\"     2) Has high blood pressure. Heart races at night and feels like she's having a panic attack. Busy and stressed at work, but it should calm down soon.    3) Health Maintenance: Getting a flu shot today.     Current Outpatient " "Medications   Medication Sig     ascorbic acid, vitamin C, (VITAMIN C) 1000 MG tablet Take 1,000 mg by mouth.     cholecalciferol, vitamin D3, (VITAMIN D3) 1,000 unit capsule Take 1,000 Units by mouth daily.     DOCOSAHEXANOIC ACID/EPA (FISH OIL ORAL) Take by mouth.     FLUoxetine (PROZAC) 40 MG capsule Take 1 capsule (40 mg total) by mouth daily.     gabapentin (NEURONTIN) 100 MG capsule Take 100 mg by mouth at bedtime.     levothyroxine (SYNTHROID, LEVOTHROID) 25 MCG tablet Take 1 tablet (25 mcg total) by mouth daily.     magnesium 200 mg Tab Take 200 mg by mouth daily.     magnesium carbonate, bulk, Powd As directed.     multivit 33-mtfolate-nac-chrom 2.5-200-1 mg-mg-mg cap Take by mouth.     propranolol (INDERAL) 10 MG tablet Take 1 tablet (10 mg total) by mouth 3 (three) times a day.     traZODone (DESYREL) 100 MG tablet Take 1 tablet (100 mg total) by mouth at bedtime.       PSFHx:   Has lost about 40 pounds on Medifast diet.     Appropriate sections of PMH completed/filled in  Tobacco Status:  She  reports that she has been smoking. She has been smoking about 0.50 packs per day. She has never used smokeless tobacco.    Review of Systems:   Feels like she sweats all the time. All other systems negative except as noted above.    Objective:    /82 (Patient Site: Left Arm, Patient Position: Sitting, Cuff Size: Adult Regular)   Pulse 68   Temp 99  F (37.2  C) (Oral)   Resp 12   Ht 5' 9\" (1.753 m)   Wt 220 lb (99.8 kg)   BMI 32.49 kg/m    GENERAL: No acute distress.  HEENT: Obvious gross swelling on left neck, generally (poorly demarcated), up to the left eye, to the jaw, and down to the clavicle. Very lightly erythematous    Reviewed 2018 thyroid u/s and labs with the patient    ADDITIONAL HISTORY SUMMARIZED (2): None.  DECISION TO OBTAIN EXTRA INFORMATION (1): None.   RADIOLOGY TESTS (1): Reviewed 1/12/2018 US. Ordered US.   LABS (1): Reviewed 10/03/2019 labs.   MEDICINE TESTS (1): None.  INDEPENDENT " REVIEW (2 each): None.       The visit lasted a total of 15 minutes face to face with the patient. Over 50% of the time was spent counseling and educating the patient about neck swelling and pain.    uGillermina ONEIL, am scribing for and in the presence of, Dr. Manuel.    NANCY ONEIL, personally performed the services described in this documentation, as scribed by Guillermina Prater in my presence, and it is both accurate and complete.    Total data points: 2

## 2021-06-02 NOTE — TELEPHONE ENCOUNTER
Left message regarding the results of the CT soft tissue neck on her answering service. Advised that she call to set up an appointment to discuss next steps in detail.

## 2021-06-02 NOTE — TELEPHONE ENCOUNTER
Spoke w/ pt, call soft xferred to [facility]. Appt scheduled for:    Oct 21, 2019  2:15 PM CDT  (Arrive by 2:00 PM)  Consult with Santosh REYES MD  [facility] ([facility]) [address] [place] [address], [place] [id number] [phone]

## 2021-06-02 NOTE — TELEPHONE ENCOUNTER
I called patient back and let her know Dr. Doe has not had a chance to review her CT scan. She is requesting an appointment to see him back to discuss her thyroid nodules and trouble swallowing. I scheduled an appointment for next week with Dr. Doe.    Yumiko Woods RN  [facility]  [phone]

## 2021-06-03 VITALS — HEIGHT: 69 IN | WEIGHT: 216 LBS | BODY MASS INDEX: 31.99 KG/M2

## 2021-06-03 VITALS
HEIGHT: 69 IN | SYSTOLIC BLOOD PRESSURE: 134 MMHG | DIASTOLIC BLOOD PRESSURE: 82 MMHG | WEIGHT: 220 LBS | BODY MASS INDEX: 32.58 KG/M2 | RESPIRATION RATE: 16 BRPM | HEART RATE: 76 BPM

## 2021-06-03 VITALS
DIASTOLIC BLOOD PRESSURE: 82 MMHG | HEIGHT: 69 IN | HEART RATE: 68 BPM | RESPIRATION RATE: 12 BRPM | TEMPERATURE: 99 F | SYSTOLIC BLOOD PRESSURE: 142 MMHG | BODY MASS INDEX: 32.58 KG/M2 | WEIGHT: 220 LBS

## 2021-06-03 VITALS
BODY MASS INDEX: 31.9 KG/M2 | TEMPERATURE: 98.9 F | HEART RATE: 80 BPM | DIASTOLIC BLOOD PRESSURE: 80 MMHG | RESPIRATION RATE: 12 BRPM | SYSTOLIC BLOOD PRESSURE: 114 MMHG | WEIGHT: 216 LBS

## 2021-06-03 VITALS
TEMPERATURE: 98.3 F | HEIGHT: 69 IN | SYSTOLIC BLOOD PRESSURE: 130 MMHG | HEART RATE: 67 BPM | RESPIRATION RATE: 16 BRPM | BODY MASS INDEX: 32.14 KG/M2 | OXYGEN SATURATION: 99 % | DIASTOLIC BLOOD PRESSURE: 76 MMHG | WEIGHT: 217 LBS

## 2021-06-03 NOTE — TELEPHONE ENCOUNTER
Who is calling:  Patient  Reason for Call:  Patient calling back to schedule pre op appointment, per below note clinic left message to call back for scheduling, however, writer unsure if ok to send patient to back line (lizandro)- as there are no instructions noted below and there are no instructions  for scheduling. Patient would like to add to below note that she is ok with seeing Dr Madelin Tate ( if PCP unavailable) on 11/25/19 in evening or on 11/27.  Date of last appointment with primary care: na  Okay to leave a detailed message: Yes

## 2021-06-03 NOTE — TELEPHONE ENCOUNTER
Called patient and reviewed results and recommendations per Dr. Guillen. Patient verbalized understanding and agreement with plan. Message sent to scheduling to contact patient to schedule routine follow-up with Dr. Guillen. -markel

## 2021-06-03 NOTE — TELEPHONE ENCOUNTER
Patient was contacted to schedule surgery.    Provider:    Location: [facility]  Date: 12/3/19  Time of arrival:0600    Pre-op instructions and confirmation sheet mailed

## 2021-06-03 NOTE — TELEPHONE ENCOUNTER
----- Message from Nader Guillen MD sent at 11/21/2019  7:50 AM CST -----  Maru,    Stress test is negative which puts her at a low risk for significant blockages in the coronary arteries.  I would like to see her back which she can go ahead with her surgical procedure.  The follow-up visit is not urgent.    Thanks,    Nader

## 2021-06-03 NOTE — PROGRESS NOTES
Patient in for Pre op exam.  She saw cardiology 9/25/2019 who recommended a stress echo.  Her insurance has denied coverage.  Both she and I will contact cardiology to discuss the matter.  She will reschedule the exam after this has been resolved.

## 2021-06-03 NOTE — TELEPHONE ENCOUNTER
Left message for pt to call back clinic, as she is scheduled for a pre-op with Dr Bebeto lyons.  Just want to make sure she is aware that she needs to get Echo Stress Test done before she can get clearance for surgery.  Please relay message to pt when she calls back.  Dr Tate can still see her tonight for pre-op but she needs to get the test scheduled ASAP.    Thank you.

## 2021-06-03 NOTE — PATIENT INSTRUCTIONS - HE
Hold all supplements, aspirin and NSAIDs for 7 days prior to surgery.    Follow your surgeon's direction on when to stop eating and drinking prior to surgery.    Your surgeon will be managing your pain after your surgery.      Remove all jewelry and metal piercings before your surgery.     Remove nail polish from fingers before surgery.

## 2021-06-03 NOTE — PROGRESS NOTES
HISTORY OF PRESENT ILLNESS  Patient comes in to discuss the results of her CT scan of the neck.     REVIEW OF SYSTEMS  Review of Systems: a 10-system review was performed. Pertinent positives are noted in the HPI and on a separate scanned document in the chart.    PMH, PSH, FH and SH has documented in the EHR.      EXAM    CONSTITUTIONAL  General Appearance:  Normal, well developed, well nourished, no obvious distress  Ability to Communicate:  communicates appropriately.    HEAD AND FACE  Appearance and Symmetry:  Normal, no scalp or facial scarring or suspicious lesions.    NECK  Masses/lymph nodes:  Normal, No worrisome neck masses or lymph nodes.  Salivary glands:  Normal, Parotid and submandibular glands.  Trachea and larynx position:  Normal, Trachea and larynx midline.  Thyroid:  Enlarged left thyroid  Tenderness:  Normal, No cervical tenderness.  Suppleness:  Normal, Neck supple    NEUROLOGICAL  Speech pattern:  Normal, Proasaic    RESPIRATORY  Symmetry and Respiratory effort:  Normal, Symmetric chest movement and expansion with no increased intercostal retractions or use of accessory muscles.     IMPRESSION  Left thyroid lesion that is causing symptoms due to compression symptoms.     RECOMMENDATION  Left thyroid lobectomy. I discussed the procedure, goals and risks. I answered the patient questions. She would like to proceed.     Santosh Doe MD

## 2021-06-03 NOTE — TELEPHONE ENCOUNTER
----- Message from Tahir Andraelesli sent at 11/19/2019  8:17 AM CST -----  General phone call:    Caller:  Patient  Primary cardiologist: Wilmer  Detailed reason for call:  Patient states her insurance company will not cover stress test which is why she has not had it done, patient  states she unable to be cleared for thyroid surgery unless she performs the stress test? Patient would like phone call to discuss her options   New or active symptoms? no  Best phone number: [phone]   Best time to contact:anytime  Ok to leave a detailed message? yes  Device? no    Additional Info:

## 2021-06-03 NOTE — TELEPHONE ENCOUNTER
Called patient and reviewed recommendation per Dr. Guillen. Patient agreeable to plan.   Order placed and patient transferred to scheduled test. -markel

## 2021-06-03 NOTE — TELEPHONE ENCOUNTER
New Appointment Needed  What is the reason for the visit:    Pre-Op Appt Request  When is the surgery? :  12/3  Where is the surgery?:   [facility]   Who is the surgeon? :  Dr. Micheal Doe   What type of surgery is being done?: Left Thyroid Removed   Provider Preference: PCP only  How soon do you need to be seen?: As soon as able. Please call patient with options.  Waitlist offered?: No  Okay to leave a detailed message:  Yes

## 2021-06-03 NOTE — PROGRESS NOTES
Preoperative Exam    Scheduled Procedure: left thyroid lobectomy  Surgery Date:  12/3/2019  Surgery Location: [facility], fax [phone]    Surgeon:  Nader    Assessment/Plan:     1. Pre-op exam  HM2(CBC w/o Differential)    Basic Metabolic Panel   2. Thyroid nodule  HM2(CBC w/o Differential)    Basic Metabolic Panel   3. Obesity, unspecified classification, unspecified obesity type, unspecified whether serious comorbidity present     4. Screening for condition  HIV Antigen/Antibody Screening [place]    Hepatitis C Antibody (Anti-HCV)        Known Risk Factors:  Mild obesity    Patient had recent negative graded stress test.        1. Preoperative workup as follows:  Orders Placed This Encounter   Procedures     HM2(CBC w/o Differential)     Basic Metabolic Panel     HIV Antigen/Antibody Screening [place]     Hepatitis C Antibody (Anti-HCV)           2. Change in medication regimen before surgery:  None          3.    Patient Instructions         Hold all supplements, aspirin and NSAIDs for 7 days prior to surgery.    Follow your surgeon's direction on when to stop eating and drinking prior to surgery.    Your surgeon will be managing your pain after your surgery.      Remove all jewelry and metal piercings before your surgery.     Remove nail polish from fingers before surgery.            Surgical Procedure Risk: Intermediate (reported cardiac risk generally 1-5%)    Cardiac Risk Assessment: no increased risk for major cardiac complications    Patient has had anesthesia before with no history of anesthesia reaction.  negative family history of anesthetic problems.    APPROVAL GIVEN to proceed with proposed procedure, without further diagnostic evaluation            Functional Status: Independent  Patient plans to recover at home with family.         Subjective:      Brenna Fournier is a 54 y.o. female  who presents for a preoperative consultation.  Patient has had left neck mass for some time.  Recently  has been having pain along the left side of her neck.  CT shows left thyroid gland lesion  Displacing and narrowing the trachea and displacing the esophagus.  Planned left lobectomy.    Patient was evaluated by cardiology in late September due to neck and jaw discomfort.  Due to mildly elevated LDL and tobacco abuse along with this symptoms, stress testing was performed.  Stress test was negative and cardiology cleared patient for surgery from their standpoint.        Pertinent History  Do you have difficulty breathing after walking up a flight of stairs:No  Do you have chest pain after walking up a flight of stairs:  No  History of obstructive sleep apnea:  No  Steroid use in the last 6 months: No  Frequent Aspirin/NSAID use: No  Prior Blood Transfusion: No  Prior Blood Transfusion Reaction: N/A  If for some reason prior to, during or after the procedure, if it is medically indicated, would you be willing to have a blood transfusion?:  There is no transfusion refusal.      Have you had prior anesthesia?:Yes  Have you  had a previous anesthesia reaction:  No  Have any family members had a previous anesthesia reaction: No  Do you have a history of a clotting or bleeding disorder?:  No  Do any family members have a history of a clotting or bleeding disorder?:  No     ROS:    GENERAL: Fever: no Chills  no   Fatigue no  HEENT: Cold symptoms:no  RESPIRATORY:  Cough: no       Dyspnea: no  CARDIOVASCULAR: Chest Pain: no  Palpitations: no  GI: Vomiting: no   Diarrhea: no   : Dysuria: no  Frequency no  NEURO: Dysphasia: no   Motor Weakness: no   Numbness: no  SKIN: Rash: no  HEME:  Bleeding/Bruising Issues: no  MS:  Lower Extremity Swelling no    Exercise Capacity: Normal    MEDICATIONS:  Current Outpatient Medications on File Prior to Visit   Medication Sig Dispense Refill     propranolol (INDERAL) 10 MG tablet Take 1 tablet (10 mg total) by mouth 3 (three) times a day. 30 tablet 2     [DISCONTINUED] ascorbic acid,  vitamin C, (VITAMIN C) 1000 MG tablet Take 1,000 mg by mouth.       [DISCONTINUED] cholecalciferol, vitamin D3, (VITAMIN D3) 1,000 unit capsule Take 1,000 Units by mouth daily.       [DISCONTINUED] DOCOSAHEXANOIC ACID/EPA (FISH OIL ORAL) Take by mouth.       [DISCONTINUED] FLUoxetine (PROZAC) 40 MG capsule Take 1 capsule (40 mg total) by mouth daily. 90 capsule 3     [DISCONTINUED] gabapentin (NEURONTIN) 100 MG capsule Take 100 mg by mouth at bedtime. 90 capsule 1     [DISCONTINUED] levothyroxine (SYNTHROID, LEVOTHROID) 25 MCG tablet Take 1 tablet (25 mcg total) by mouth daily. 90 tablet 1     [DISCONTINUED] magnesium 200 mg Tab Take 200 mg by mouth daily.       [DISCONTINUED] magnesium carbonate, bulk, Powd As directed.       [DISCONTINUED] multivit 33-mtfolate-nac-chrom 2.5-200-1 mg-mg-mg cap Take by mouth.       [DISCONTINUED] traZODone (DESYREL) 100 MG tablet Take 1 tablet (100 mg total) by mouth at bedtime. 30 tablet 6     No current facility-administered medications on file prior to visit.        ALLERGIES:  Allergies   Allergen Reactions     Gadolinium-Containing Contrast Media Anaphylaxis and Itching     Diatrizoate Meglumine Itching     5 minutes post CT injection, patient noted her eyes and ears were extremely itchy     Esomeprazole Sodium      Gluten Itching     Hydrocodone-Acetaminophen      Latex      Omeprazole        PROBLEM LIST:  Patient Active Problem List   Diagnosis     Cystic Acne     Thyroid Diffuse Enlargement     Nontoxic Solitary Thyroid Nodule     Joint Pain, Localized In The Knee     Obesity     Jaw pain       PAST MEDICAL HISTORY:  Past Medical History:   Diagnosis Date     Elevated blood pressure reading without diagnosis of hypertension 9/25/2019       PAST SURGICAL HISTORY:  Past Surgical History:   Procedure Laterality Date     JOINT REPLACEMENT       PA KNEE SCOPE,DIAGNOSTIC      Description: Arthroscopy Knee Right;  Recorded: 11/21/2012;     PA FAROOQ  ARTHROSCOP,SURG,W/REMOVAL,LOOSE/FB      Description: Shoulder Arthroscopy;  Recorded: 2012;       SOCIAL HISTORY:  Social History     Occupational History     Not on file   Tobacco Use     Smoking status: Current Every Day Smoker     Packs/day: 0.50     Smokeless tobacco: Never Used   Substance and Sexual Activity     Alcohol use: Yes     Alcohol/week: 4.0 standard drinks     Types: 4 Glasses of wine per week     Drug use: Not on file     Sexual activity: Not on file       FAMILY HISTORY:  Family History   Problem Relation Age of Onset     Colon cancer Father        IMMUNIZATIONS:  Immunization History   Administered Date(s) Administered     INFLUENZA,RECOMBINANT,INJ,PF QUADRIVALENT 18+YRS 10/16/2019     Influenza,seasonal,quad inj =/> 6months 2017     Td,adult,historic,unspecified 1963     Tdap 2018             Objective:     Vitals:    19 1303   BP: 114/80   Pulse: 80   Resp: 12   Temp: 98.9  F (37.2  C)   TempSrc: Oral   Weight: 216 lb (98 kg)       GEN:  NAD, cooperative  MS:  Alert, oriented, affect normal, speech normal  HEENT:  Conjunctiva clear.  Sclera anicteric.  Nares clear.  Oropharynx clear without erythema or exudate.  TMs and EACs normal.  NECK:  supple, no lymphadenopathy or thyromegaly  CV:  S1S2 RRR, no M/R/G  RESP:  CTA bilaterally  ABD: +BS, soft, nontender, nondistended, No organomegaly   EXT:  Warm and dry, no edema   NEUROLOGIC:  PERRLA. .  No tremor, no focal findings.  Normal gait.    SKIN:  No rashes or lesions.      Cardiographics  EC2019  RATE: 73  Rhythm: sinus  ST-T wave:no acute changes  Q wave: none  Comparison: none  Normal EKG  I have personally read the EKG.      GRADED STRESS TEST  2019  .Good exercise tolerance for age achieving 10.5 METS on the standard Harvinder protocol.     2.The stress electrocardiogram is adequate negative for inducible ischemic EKG changes after 9 minutes and 3 seconds on the standard Harvinder protocol.     3.No  symptoms were recorded during exercise.     4.There is no prior study for comparison.        Lab Review:   Labs pending at this time.  Results will be reviewed when available.                Madelin Tate MD  11/27/2019     Electronically signed by Madelin Tate MD

## 2021-06-04 VITALS
SYSTOLIC BLOOD PRESSURE: 126 MMHG | RESPIRATION RATE: 20 BRPM | WEIGHT: 231 LBS | HEART RATE: 74 BPM | OXYGEN SATURATION: 99 % | DIASTOLIC BLOOD PRESSURE: 74 MMHG | BODY MASS INDEX: 34.21 KG/M2 | TEMPERATURE: 99.5 F | HEIGHT: 69 IN

## 2021-06-04 NOTE — ANESTHESIA PREPROCEDURE EVALUATION
Anesthesia Evaluation      Patient summary reviewed   No history of anesthetic complications     Airway   Mallampati: II  Neck ROM: full   Pulmonary - normal exam                          Cardiovascular - normal exam  ECG reviewed        Neuro/Psych      Endo/Other    (+) obesity,      GI/Hepatic/Renal            Dental - normal exam                        Anesthesia Plan  Planned anesthetic: general endotracheal    ASA 2   Induction: intravenous   Anesthetic plan and risks discussed with: patient  Anesthesia plan special considerations: antiemetics,   Post-op plan: routine recovery

## 2021-06-04 NOTE — ANESTHESIA CARE TRANSFER NOTE
Last vitals:   Vitals:    12/17/19 1215   BP: 170/82   Pulse: 75   Resp: 14   Temp: 36.6  C (97.9  F)   SpO2: 97%     Patient's level of consciousness is drowsy  Spontaneous respirations: yes  Maintains airway independently: yes  Dentition unchanged: yes  Oropharynx: oropharynx clear of all foreign objects    QCDR Measures:  ASA# 20 - Surgical Safety Checklist: WHO surgical safety checklist completed prior to induction    PQRS# 430 - Adult PONV Prevention: 4558F - Pt received => 2 anti-emetic agents (different classes) preop & intraop  ASA# 8 - Peds PONV Prevention: NA - Not pediatric patient, not GA or 2 or more risk factors NOT present  PQRS# 424 - Xochitl-op Temp Management: 4559F - At least one body temp DOCUMENTED => 35.5C or 95.9F within required timeframe  PQRS# 426 - PACU Transfer Protocol: - Transfer of care checklist used  ASA# 14 - Acute Post-op Pain: ASA14B - Patient did NOT experience pain >= 7 out of 10

## 2021-06-04 NOTE — PROGRESS NOTES
HISTORY OF PRESENT ILLNESS  Patient comes in for recheck after left thyroid lobectomy and removal of large cyst. She has noticed a rough spot on the incision line that sometimes snags her clothing and is uncomfortable.     REVIEW OF SYSTEMS  Review of Systems: a 10-system review was performed. Pertinent positives are noted in the HPI and on a separate scanned document in the chart.    PMH, PSH, FH and SH has documented in the EHR.      EXAM    CONSTITUTIONAL  General Appearance:  Normal, well developed, well nourished, no obvious distress  Ability to Communicate:  communicates appropriately.    HEAD AND FACE  Appearance and Symmetry:  Normal, no scalp or facial scarring or suspicious lesions.    NECK  Small area on the incision line that appears slightly inflamed. May be an extruding stitch. The area was infiltrated with lidocaine plus epinephrine. After allowing adequate time for anesthesia a scissors was used to open the area. There appeared to be a small 2mm area of inflammation that was debrided. She tolerated this.   Masses/lymph nodes:  Normal, No worrisome neck masses or lymph nodes.  Salivary glands:  Normal, Parotid and submandibular glands.  Trachea and larynx position:  Normal, Trachea and larynx midline.  Thyroid:  Normal, No thyroid abnormality.  Tenderness:  Normal, No cervical tenderness.  Suppleness:  Normal, Neck supple    NEUROLOGICAL  Speech pattern:  Normal, Proasaic    RESPIRATORY  Symmetry and Respiratory effort:  Normal, Symmetric chest movement and expansion with no increased intercostal retractions or use of accessory muscles.     IMPRESSION  Doing as expected. Small stitch absces.     RECOMMENDATION  Follow up as needed.    Santosh Doe MD

## 2021-06-05 ENCOUNTER — RECORDS - HEALTHEAST (OUTPATIENT)
Dept: FAMILY MEDICINE | Facility: CLINIC | Age: 56
End: 2021-06-05

## 2021-06-05 DIAGNOSIS — E04.1 NONTOXIC UNINODULAR GOITER: ICD-10-CM

## 2021-06-08 ENCOUNTER — OFFICE VISIT - HEALTHEAST (OUTPATIENT)
Dept: CARDIOLOGY | Facility: CLINIC | Age: 56
End: 2021-06-08

## 2021-06-08 DIAGNOSIS — R06.09 DYSPNEA ON EXERTION: ICD-10-CM

## 2021-06-08 DIAGNOSIS — R61 DIAPHORESIS: ICD-10-CM

## 2021-06-08 DIAGNOSIS — R07.2 PRECORDIAL PAIN: ICD-10-CM

## 2021-06-08 LAB
ALBUMIN SERPL-MCNC: 3.9 G/DL (ref 3.5–5)
ALP SERPL-CCNC: 72 U/L (ref 45–120)
ALT SERPL W P-5'-P-CCNC: 46 U/L (ref 0–45)
ANION GAP SERPL CALCULATED.3IONS-SCNC: 7 MMOL/L (ref 5–18)
AST SERPL W P-5'-P-CCNC: 35 U/L (ref 0–40)
BILIRUB SERPL-MCNC: 0.7 MG/DL (ref 0–1)
BUN SERPL-MCNC: 20 MG/DL (ref 8–22)
CALCIUM SERPL-MCNC: 8.3 MG/DL (ref 8.5–10.5)
CHLORIDE BLD-SCNC: 108 MMOL/L (ref 98–107)
CHOLEST SERPL-MCNC: 189 MG/DL
CO2 SERPL-SCNC: 26 MMOL/L (ref 22–31)
CREAT SERPL-MCNC: 0.84 MG/DL (ref 0.6–1.1)
ERYTHROCYTE [DISTWIDTH] IN BLOOD BY AUTOMATED COUNT: 12.6 % (ref 11–14.5)
FASTING STATUS PATIENT QL REPORTED: ABNORMAL
GFR SERPL CREATININE-BSD FRML MDRD: >60 ML/MIN/1.73M2
GLUCOSE BLD-MCNC: 109 MG/DL (ref 70–125)
HCT VFR BLD AUTO: 43.2 % (ref 35–47)
HDLC SERPL-MCNC: 46 MG/DL
HGB BLD-MCNC: 14.9 G/DL (ref 12–16)
LDLC SERPL CALC-MCNC: 117 MG/DL
MCH RBC QN AUTO: 31.5 PG (ref 27–34)
MCHC RBC AUTO-ENTMCNC: 34.5 G/DL (ref 32–36)
MCV RBC AUTO: 91 FL (ref 80–100)
PLATELET # BLD AUTO: 226 THOU/UL (ref 140–440)
PMV BLD AUTO: 9.6 FL (ref 8.5–12.5)
POTASSIUM BLD-SCNC: 4.1 MMOL/L (ref 3.5–5)
PROT SERPL-MCNC: 6.9 G/DL (ref 6–8)
RBC # BLD AUTO: 4.73 MILL/UL (ref 3.8–5.4)
SODIUM SERPL-SCNC: 141 MMOL/L (ref 136–145)
TRIGL SERPL-MCNC: 128 MG/DL
TSH SERPL DL<=0.005 MIU/L-ACNC: 4.43 UIU/ML (ref 0.3–5)
WBC: 5 THOU/UL (ref 4–11)

## 2021-06-10 NOTE — TELEPHONE ENCOUNTER
Triage call:   7/23/2020 Colonoscopy- had 6 polyps removed and was told that her colon was bruised at that time.       Pain ever since she had the procedure  Pain on upper left side under rib cage to lower left abdomen  No fever- Sweats at night and cold during the day   Has been very constipated and had blood in her stool today- Blood in her stool was darker maroon and appears like a clot- 1/2 in stringy clot wrapped around a hard piece of stool   Taking fiber and not helping with the constipation.   Was told that she has a moderate case of diverticulosis - pain gradually gotten worse since 7/23/2020- Constant pain - Currently rating pain 5/10   Notes abdominal bloating in her lower abdomen  No radiating pain   Eating makes worse, drinking hot tea or hot water makes it better  No vomiting or urinary symptoms- denies additional symptoms.      COVID 19 Nurse Triage Plan/Patient Instructions    Please be aware that novel coronavirus (COVID-19) may be circulating in the community. If you develop symptoms such as fever, cough, or SOB or if you have concerns about the presence of another infection including coronavirus (COVID-19), please contact your health care provider or visit [email or web address].     Disposition/Instructions    ED Visit recommended. Follow protocol based instructions.      Bring Your Own Device:  Please also bring your smart device(s) (smart phones, tablets, laptops) and their charging cables for your personal use and to communicate with your care team during your visit.      Thank you for taking steps to prevent the spread of this virus.  o Limit your contact with others.  o Wear a simple mask to cover your cough.  o Wash your hands well and often.    Resources    [facility]: About COVID-19: [email or web address]    CDC: What to Do If You're Sick: www.cdc.gov/coronavirus/2019-ncov/about/steps-when-sick.html    CDC: Ending Home Isolation:  www.cdc.gov/coronavirus/2019-ncov/hcp/disposition-in-home-patients.html     CDC: Caring for Someone: www.cdc.gov/coronavirus/2019-ncov/if-you-are-sick/care-for-someone.html     [facility]: Interim Guidance for Hospital Discharge to Home: www.health.[facility]/diseases/coronavirus/hcp/hospdischarge.pdf    [facility] clinical trials (COVID-19 research studies): [email or web address]     Below are the COVID-19 hotlines at the [place] Department of Health ([facility]). Interpreters are available.   o For health questions: Call 905-693-3879 or 1-953.425.9156 (7 a.m. to 7 p.m.)  o For questions about schools and childcare: Call 289-791-7898 or 1-771.721.8284 (7 a.m. to 7 p.m.)     Pratima White RN BSBA [facility] Triage/Med Refill 8/7/2020 9:52 AM    Reason for Disposition    Bloody, black, or tarry bowel movements    Additional Information    Negative: Passed out (i.e., fainted, collapsed and was not responding)    Negative: Shock suspected (e.g., cold/pale/clammy skin, too weak to stand, low BP, rapid pulse)    Negative: Sounds like a life-threatening emergency to the triager    Negative: Chest pain    Negative: Pain is mainly in upper abdomen (if needed ask: 'is it mainly above the belly button?')    Negative: Abdominal pain and pregnant > 20 weeks    Negative: Abdominal pain and pregnant < 20 weeks    Negative: SEVERE abdominal pain (e.g., excruciating)    Negative: Vomiting red blood or black (coffee ground) material    Protocols used: ABDOMINAL PAIN - FEMALE-A-OH

## 2021-06-12 NOTE — PROGRESS NOTES
Assessment: /    Plan:    1. Irritable bowel syndrome with both constipation and diarrhea  methylcellulose (CITRUCEL) 500 mg Tab    polyethylene glycol (GLYCOLAX) 17 gram/dose powder   2. Need for vaccination  Influenza, Recombinant, Inj, Quadrivalent, PF, 18+YRS       She will take three fourths of a dose of MiraLAX when needed.    She can try milk of magnesia, or magnesium supplement if needed.    Information given regarding quit plan.    Call or recheck if any problem.        Subjective:    HPI:  Brenna Fournier is a 55-year-old female presenting for evaluation of bowel concerns.  She often has diarrhea, but sometimes has constipation.  She states that she was told that she has irritable bowel syndrome.  She had colonoscopy .    She takes 6 capsules of Citrucel daily.  If she takes a full dose of MiraLAX, she has diarrhea.  Half dose does not have any effect.    Family history: Her father  of colon cancer, diagnosed in his 80s.    Social Hx: She would like to quit smoking.  She has used Chantix in the past.    Review of Systems: No fever, vomiting, melena, hematochezia.      Current Outpatient Medications   Medication Sig Dispense Refill     dicyclomine (BENTYL) 20 mg tablet Take 1 tablet (20 mg total) by mouth every 6 (six) hours as needed (abdominal pain). 20 tablet 0     methylcellulose (CITRUCEL) 500 mg Tab Take 1 tablet (500 mg total) by mouth 3 (three) times a day.  0     polyethylene glycol (GLYCOLAX) 17 gram/dose powder Take 12 g by mouth daily.  0     propranolol (INDERAL) 10 MG tablet Take 1 tablet (10 mg total) by mouth 3 (three) times a day. 30 tablet 2     No current facility-administered medications for this visit.          Objective:    Vitals:    10/02/20 1118   BP: 126/74   Pulse: 74   Resp: 20   Temp: 99.5  F (37.5  C)   SpO2: 99%       Gen:  NAD, VSS  Lungs:  normal  Heart:  normal  Abdomen:  No HSM, mass or tenderness        ADDITIONAL HISTORY SUMMARIZED (2): None.  DECISION TO OBTAIN  EXTRA INFORMATION (1): None.   RADIOLOGY TESTS (1): None.  LABS (1): None.  MEDICINE TESTS (1): None.  INDEPENDENT REVIEW (2 each): None.     Total Data Points: 0

## 2021-06-12 NOTE — PROGRESS NOTES
Subjective:   Brenna comes in today with 2 big concerns.  She has been having a lot of problems with her bowels.  She has a history of irritable bowel syndrome.  She gets episode of constipation alternating with diarrhea.  Sometimes she can have diarrhea for 3 or 4 days in a row.  She denies any blood in the stool.  She denies any fevers, sweats or chills.  Certain foods such as gluten seem to make her problem worse.  She is also having a lot of problems with anxiety.  She is undergoing a lot of stressors recently.  She her father was recently diagnosed with colon cancer.  Her work is very demanding.  She is also in a masters program at this point in time which is quite stressful.  Her sleep pattern is been poor.  She has not been very happy.  She loses her temper easily.  She worries about a lot of things.  She feels very tired and has low energy in general.      Objective:  HEENT: Pupils equally round and reactive to light.  EOMs are full.  Pharynx is clear.  Neck: Neck reveals no lymphadenopathy.  The left lobe of the thyroid seems slightly enlarged compared to the right.  The patient has had this in the past.  Lungs: Lungs are clear bilaterally.  Cardiac: There is a regular rhythm present.  No murmur heard.  No ectopy present. Neurologic: Cranial nerves all are intact.  Speech is clear.  Motor strength appears normal today.  Abdomen: Abdomen reveals generalized tenderness.  No rebound, guarding or rigidity present.  No masses noted.  Bowel sounds are active throughout.        Assessment:  1.  Constipation and diarrhea consistent with irritable bowel syndrome  2.  Anxiety secondary to multiple stressors      Plan:  I discussed medications for anxiety with the patient today.  We have decided to start citalopram 20 mg daily.  We can increase this to 40 mg in a month if she is tolerating it well and needs further assistance with her symptoms.  She will continue to limit gluten in her diet.  She will stay away from  spicy foods and citric foods as well as caffeine.  She will add probiotics to her diet.  We will also add a little fiber.  She will update me within the next month with her progress.

## 2021-06-13 NOTE — PROGRESS NOTES
Subjective:   Brenna comes in today with a swelling in her neck.  She has noticed the swelling coming on over the last 3-4 months.  She has a history of a thyroid cyst.  She had this biopsied 3 or 4 years ago.  It was benign.  She is now here for recheck.  She feels like it is causing a little bit of pain in the neck.  The pain will radiate up to the back of the left ear.  She does not remember any injury to the area.  She has had some associated fatigue with this.  She is concerned about that.  She also feels like the citalopram is not working as well as it used to.  She has been using it for treatment of depression.  It causes her to have some anxiety and sweats.  She would like to wean off that and try something else.      Objective:  HEENT: Sinuses nontender.  Pharynx is clear.  Neck: No lymphadenopathy present.  There is a swelling noted in the left lobe of the thyroid.  It is minimally tender.  The sternocleidomastoid muscle is tender.  Left mastoid is slightly tender.  Rotational movement of the neck exacerbates pain in the left side of the neck.  No bruits are heard.  Lungs: Lungs are totally clear.  Neurologic: Cranial nerves all appear intact.  Affect seems slightly flat.  Motor and sensory exams totally normal.      Assessment:  1.  Thyromegaly consistent with thyroid nodule  2.  Mood disorder consistent with major depression      Plan:  The patient will wean off of the citalopram over the next 2 weeks.  We will start fluoxetine 20 mg daily.  She will update me in a month with her progress.  If she is doing well we will continue the fluoxetine at 20 mg otherwise we will increase this in 1 month.  Endocrinology referral was given today.  Patient will need further workup for the thyroid cyst.  We did discuss possible fine-needle aspiration and biopsy as well as possible thyroid lobectomy as treatment of this.  Routine lab work for workup of fatigue is done today and patient will be called with any  abnormalities.

## 2021-06-15 NOTE — PROGRESS NOTES
Progress Note    Reason for Visit:  Chief Complaint     Thyroid Problem; Diabetes Mellitus          Progress Note:    HPI:      This patient seen in consultation at the request of the primary care physician because of thyroid nodule and possible diabetes.    Thank you for referring this pleasant 52-year-old female patient who has known about thyroid nodule for several years she had an ultrasound back in 2014 which showed that she has a 5 mm right-sided thyroid nodule and a 3.2 cm left-sided thyroid nodule.    According to the patient she had a biopsy of the left-sided thyroid nodule and it was benign I do not have these results.    She occasionally feels some pressure in her neck.    She is denying family history of thyroid disease but she said that her grandmother had diabetes.    She has been putting on weight of 50 pounds over the last 3-5 years she feels fatigue and tired with night sweats.    She takes vitamin D 1000 units a day Prozac 20 mg.    Unfortunately the patient smokes and she drinks as well.    She is  with no children.    She had knee replacement.  Component      Latest Ref Rng & Units 7/23/2014 7/23/2014 11/13/2014 9/18/2017           2:34 PM  2:34 PM     Sodium      136 - 145 mmol/L   142 141   AST      0 - 40 U/L    21   ALT      0 - 45 U/L    22   Globulin      1.5 - 3.5 g/dL       Anion Gap, Calculation      5 - 18 mmol/L   10 13   A/G RATIO      1.0 - 2.2       Alkaline Phosphatase      45 - 120 U/L    104   Protein, Total      6.0 - 8.0 g/dL    7.3   Glucose      70 - 125 mg/dL   66 (L) 77   Potassium      3.5 - 5.0 mmol/L   4.8 4.5   ALBUMIN      3.5 - 5.0 g/dL    3.7   Bilirubin, Total      0.0 - 1.0 mg/dL    0.3   BUN      8 - 22 mg/dL   19 26 (H)   Calcium      8.5 - 10.5 mg/dL   9.4 9.1   Chloride      98 - 107 mmol/L   106 104   Creatinine      0.60 - 1.10 mg/dL   0.89 1.04   CO2      22 - 31 mmol/L   26 24   GFR MDRD Non Af Amer      >60 mL/min/1.73m2   >60 55 (L)   GFR MDRD Af  Amer      >60 mL/min/1.73m2   >60 >60   TSH      0.30 - 5.00 uIU/mL 3.54   2.49   Vitamin D, Total (25-Hydroxy)      30.0 - 80.0 ng/mL       Free T4      0.7 - 1.8 ng/dL  0.8       INDICATION: Nontoxic uninodular goiter.  TECHNIQUE: Routine.  COMPARISON: 08/06/2014.     FINDINGS:  Right thyroid lobe measures 4.4 x 1.2 x 1.6. cm. There is a small hypoechoic nodule measuring 4 x 5 x 5 mm in the midportion of the right lobe. No dominant nodule greater than a centimeter.      Left thyroid lobe measures 5.4 x 2.9 x 3.2 cm. There is a cyst in the left lobe of thyroid measuring 3.8 x 2.8 x 3.2 cm. On the prior study of 07/23/2014 this measured 5.7 x 3.1 x 3.2 cm and has decreased in size. No solid masses are seen.      Isthmus measures 2.2 mm. No additional findings.     No cervical lymphadenopathy.     IMPRESSION:   CONCLUSION:  1.  Interval decrease in size and cyst in the left lobe of thyroid. This was aspirated on 08/06/2014.       Review of Systems:    Nervous System: No headache, dizziness, fainting or memory loss. No tingling sensation of hand or feet.  Ears: No hearing loss or ringing in the ears  Eyes: No blurring of vision, redness, itching or dryness.  Nose: No nosebleed or loss of smell  Mouth: No mouth sores or loss of taste  Throat: No hoarseness or difficulty swallowing  Neck: No enlarged thyroid or lymph nodes.  Heart: No chest pain, palpitation or irregular heartbeat. No swelling of hands or feet  Lungs: No shortness of breath, cough, night sweats, wheezing or hemoptysis.  Gastrointestinal: No nausea or vomiting, constipation or diarrhea.  No acid reflux, abdominal pain or blood in stools.  Kidney/Bladdr: No polyuria, polydipsia, nocturia or hematuria.  Genital/Sexual: No loss of libido  Skin: No rash, hair loss or hirsutism.  No abnormal striae  Muscles/Joints/Bones: No morning stiffness, muscle aches and pain or loss of height.    Current Medications:  Current Outpatient Prescriptions   Medication Sig  "    ascorbic acid, vitamin C, (VITAMIN C) 1000 MG tablet Take 1,000 mg by mouth.     cholecalciferol, vitamin D3, (VITAMIN D3) 1,000 unit capsule Take 1,000 Units by mouth daily.     DOCOSAHEXANOIC ACID/EPA (FISH OIL ORAL) Take by mouth.     FLUoxetine (PROZAC) 20 MG tablet Take 1 tablet (20 mg total) by mouth daily.     magnesium carbonate, bulk, Powd As directed.     multivit 33-mtfolate-nac-chrom 2.5-200-1 mg-mg-mg cap Take by mouth.       Patients Active Problems:  Patient Active Problem List   Diagnosis     Abdominal Pain     Cystic Acne     Edema     Thyroid Diffuse Enlargement     Acute Sinusitis     Fatigue     Menopause     Lump In / On The Skin     Cough     Nontoxic Solitary Thyroid Nodule     Joint Pain, Localized In The Knee     Obesity       History:   reports that she has been smoking.  She has been smoking about 0.50 packs per day. She has never used smokeless tobacco.   reports that she has been smoking.  She has been smoking about 0.50 packs per day. She has never used smokeless tobacco. Her alcohol and drug histories are not on file.  History   Smoking Status     Current Every Day Smoker     Packs/day: 0.50   Smokeless Tobacco     Never Used      reports that she has been smoking.  She has been smoking about 0.50 packs per day. She has never used smokeless tobacco. Her alcohol and drug histories are not on file.  History   Sexual Activity     Sexual activity: Not on file     No past medical history on file.  No family history on file.  No past medical history on file.  Past Surgical History:   Procedure Laterality Date     MI KNEE SCOPE,DIAGNOSTIC      Description: Arthroscopy Knee Right;  Recorded: 11/21/2012;     MI SHLDR ARTHROSCOP,SURG,W/REMOVAL,LOOSE/FB      Description: Shoulder Arthroscopy;  Recorded: 11/21/2012;       Vitals   height is 5' 8.25\" (1.734 m) and weight is 244 lb 9.6 oz (110.9 kg) (abnormal). Her blood pressure is 120/80.         Exam  General appearance: The patient looked " well, not in acute distress.  Eyes: no evidence of thyroid eye disease.   Retinal exam: No evidence of diabetic retinopathy.  Mouth and Throat: Normal  Neck: No evidence of thyromegaly, enlarged lymph node or tenderness  Chest: Trachea is central. Chest is clear to auscultation and percussion. Breat sounds are normal.  Cardiovascular exam: JVP is not raised. Heart sounds are normal, no murmurs or rub  Peripheral pulses are palpable.   Abdomen: No masses or tenderness.    Back: No vertebral tenderness or kyphosis.  Extremities: No evidence of leg edema.   Skin: Normal to touch.  No abnormal striae  Neurologic exam:  Visual fields are intact by confrontation, grossly intact. No evidence of peripheral neuropathy.  Detailed foot exam normal.        Diagnosis:  No diagnosis found.    Orders:   No orders of the defined types were placed in this encounter.        Assessment and Plan: Thyroid nodule I discussed the pathophysiology of the disease with the patient I did advise her we will do thyroid ultrasound today if there is any change inside would biopsy that    I discussed with her about the option of surgery even if it is benign and she said she will consider that and will talk about that later after we make sure that it is benign.    Her TSH tends to run high 3.5-2.4 free T4 0.8 I will start her on Synthroid 25 mcg daily.    Regarding the night sweats I will start her on Neurontin 100 mg at bedtime.    Routine his issue advise him to take vitamin D 2000 units a day.    Possible diabetes actually had fasting blood sugar tends to be low 66-77 I did advise the patient to check her A1c today we talked about diet and avoiding simple carbohydrates and actually I did advise her to eat to be to go on gluten-free diet she said she has tried that and states did help.    Patient return to clinic in 6 month I did spend 60 minutes with the patient more than 50% was spent in counseling and managing her care.

## 2021-06-16 PROBLEM — R68.84 JAW PAIN: Status: ACTIVE | Noted: 2019-09-25

## 2021-06-16 PROBLEM — R61 DIAPHORESIS: Status: ACTIVE | Noted: 2021-06-08

## 2021-06-16 PROBLEM — R07.2 PRECORDIAL PAIN: Status: ACTIVE | Noted: 2021-06-08

## 2021-06-16 PROBLEM — R06.09 DYSPNEA ON EXERTION: Status: ACTIVE | Noted: 2021-06-08

## 2021-06-16 PROBLEM — K58.2 IRRITABLE BOWEL SYNDROME WITH BOTH CONSTIPATION AND DIARRHEA: Status: ACTIVE | Noted: 2020-10-10

## 2021-06-17 NOTE — TELEPHONE ENCOUNTER
Telephone Encounter by Gilbert Lanier CMA at 10/1/2020  1:16 PM     Author: Gilbert Lanier CMA Service: -- Author Type: Certified Medical Assistant    Filed: 10/1/2020  1:18 PM Encounter Date: 10/1/2020 Status: Signed    : Gilbert Lanier CMA (Certified Medical Assistant)       Appointment Scheduling - NO ANSWER    Severson, Tammie F, LPN routed conversation to [facility] Care Team Pool 12 minutes ago (1:03 PM)      Brenna Fournier  Patient Customer Service Request Pool 47 minutes ago (12:28 PM)        Topic: General Compliment     I am trying to schedule a flu shot and an appointment regarding colon issues. No one answers the phone. I have been on hold with the [facility] twice for 45 minutes and the [facility] once for 45 minutes. I went on line to schedule an appointment and never received a response.      What do I do?      Brenna Fournier  [phone]

## 2021-06-19 NOTE — PROGRESS NOTES
Subjective:   Comes in today for discussion of her mood.  She feels like she has been more down and depressed recently.  She is under a lot of stress.  Her father is now on hospice and at his end of life.  This has been quite a stressor for her.  She is trying to care for him.  She also now is caring for her mother as this has been quite difficult for her to go through as well.  She is not sleeping well.  Her mood is low.  Her energy is down.  Socially she really does not seem to want to do things.  Her sleep pattern is very poor.  She will awaken at night and not be able to fall back asleep.  She presently takes fluoxetine 20 mg daily.  She does not feel like that is working as well as it used to.  She has no side effects from medication at all.      Objective:  HEENT: Cranial nerves all intact.  Conjunctivae clear.  Pharynx is clear.  Neck: No thyromegaly present.  Neurologic: Patient's affect was somewhat flat today.  Motor strength appeared normal.  Gait was stable.  Sensory exam normal in upper and lower extremities.  Speech was clear.  Patient answered questions appropriately today.      Assessment:  1.  Major depressive disorder secondary to life stressors  2.  Insomnia  3.  Fatigue secondary to #2      Plan:  We will increase fluoxetine to 40 mg daily.  She was instructed in use of that.  We also start trazodone 50 mg at bedtime.  If this is of no benefit she will go to 100 mg.  She was instructed in side effects of that as well.  We did discuss counseling today.  She will think about starting that.  She will try to become more aerobically active.  Follow-up here in 1 month for recheck.

## 2021-06-19 NOTE — PROGRESS NOTES
Non-surgical Weight Loss Initial Diet Evaluation     Assessment:  Pt is a 53 y.o. female being seen today for non-surgical RD nutritional evaluation. Today we reviewed current eating habits and level of physical activity, and instructed on the changes that are required for successful weight loss outcomes.    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  IBW: 142 lbs    Estimated RMR ([place]-St Jeor equation): 1759 calories  Protein requirements (.5grams to .9grams per pound IBW, 20-30% of calories, minimum of 60-80gm per day):   grams     Food allergies, intolerances, Denominational customs: gluten - diarrhea     Vitamins/Mineral Supplementation: none - rec vitamin    Biggest struggle with weight loss: stressful life - alcohol to sleep over last years     Who does the grocery shopping for your household? Self and  (philippe)   Who prepares your meals at home? Philippe  -Lives with      Diet Recall/Time: wakes at 430am   Breakfast: 2 eggs and fruit diet coke/coffee  Am Snack: protein bar  Lunch: fruit or salad   Pm snack:protein  Dinner: Pro/Veg/CHO  HS Snack: alcohol    Typical Snacks: protein bar OR fruit     Fats used at home: olive oil    Fried Foods: 0 times per week    Meals per week away from  home: 1-2X/week      Recommended limiting eating out to no more than 2x/week.  Patient and I reviewed the importance of eating three consistent meals per day; as well as meal timing to be spaced 4-5 hours apart.  Snack choices: 100-150 calories (1-2x/day if physically hungry), incorporating a fruit/vegetable w/ protein source.    Portion Sizes problematic? yes per patient/diet recall  Encouraged slowing meal times down, 20-30 minutes, chewing to applesauce consistency.   To aid in proper portion control and slow meal time down discussed consuming meals off smaller plates, use toddler/children utensils and set utensils down after each bite.    Protein, vegetables/fruits, carbohydrates:   Reviewed lean protein sources today. Recommended consuming 20-25gm protein at 3 meals daily.  The patient and I discussed the importance of including lean/low fat protein at each meal and limiting carbohydrate intake to less than 25% of plate volume.     Beverages (Type/Oz. per day)  Water: 100oz  Coffee: 1cup/day   Tea: none  Milk: none  Regular soda: none  Diet soda: 1can/ a few times/day   Juice: none  Miah-Aid/lemonade/etc: none  Alcohol: 3 drinks mixed   ++Pt plans to talk with dr regarding sleep and alcohol use    Discussed the importance of adequate hydration and the goal of 64+ oz of fluid daily.   The patient understands the importance of avoiding all alcoholic and sweetened drinks, and instead choosing 64 oz plain water.    Exercise  ADL - sedentary at work   +Pt has bike and lot of workout dvd programs    Pt's understands that 45-60 minutes of daily activity is an important part of weight loss success.   Encouraged pt to incorporate upper body strength training exercise, even if its lifting soup cans while watching tv at night, doing push ups/sit-ups, and abdominal work.    PES statement:    1. (NI-1.3)Excessive energy intake related to Food and nutrition related knowledge deficit concerning excessive energy/oral intake as  evidenced by Intake of high caloric density foods/beverages (alcohol) at meals and/or snacks; large portions; frequent grazing; Estimated intake that exceeds estimated daily energy intake; and BMI 38.04    2. (NC-3.3.5) Obese, class II, BMI ?38 related to physical inactivity as evidenced by Infrequent, low-duration and or low intensity physical activity; and Large amounts of sedentary activities; no structured physical activity regimen    Intervention  Discussion:  1. Educated pt on Eat Better, Move More, Live Well: Non-surgical Weight Loss Handout  2. Recommended to consume 20-25gm protein at 3 meals daily.  grams daily total.  3. Educated pt on food labels: keeping total sugar grams <10    Instructions/Goals:   1. Include 20-25gm protein at each meal.  2. Increase vegetable/fruit intake, by having a vegetable or fruit with each meal daily. Recommended pt to increase vegetable/fruit intake to 4-5 servings daily.  3. Increase fluid intake to 64oz daily: choose plain or calorie/alcohol-free beverages.  4. Incorporate daily structured activity, 45-60 minutes most days of the week  5. Read food labels more consistently: keeping total fat grams <10, total sugar grams <10, fiber >3gm per serving.  6. Practice plate method: 1/2 plate lean/low fat protein source, vegetable/fruit, <25% of plate complex carbohydrates.  7. Practice eating off of smaller plates/bowls, chewing to applesauce consistency, taking 20-30 minutes to eat in a calm/relaxed environment without distractions of tv/email/cell phone.    Handouts Provided:  Eat Better, Move More, Live Well: Non-surgical Weight Loss Handout  Protein Supplement List    Monitor/Evaluation:    Pt will f/u in one month with bariatrician, and f/u in two months with RD.    Plan for next visit with RD:  GOALS:  1) track protein over the next week  2) slow down dinner meal -  is a slow eater     Time In: 1:50p  Time Out: 235p      ABN signed: Yes

## 2021-06-20 NOTE — PROGRESS NOTES
Non-surgical Weight Loss Follow Up Diet Evaluation    Assessment:  This patient is a 53 y.o. female is being seen today for follow-up non-surgical nutritional evaluation. Today we reviewed the patients current eating habits and level of physical activity, and instructed on the changes that are required for successful weight loss outcomes.    Phentermine: none  +pt had no questions or concerns     Pt's Initial Weight: 244 lbs  Weight: 241 lb (109.3 kg)  Weight loss from initial: 3  % Weight loss: 1.23 %  BMI: Body mass index is 36.38 kg/(m^2).  IBW: 142 lbs     Pt Active Problem List Diagnosis:     Patient Active Problem List   Diagnosis     Abdominal Pain     Cystic Acne     Edema     Thyroid Diffuse Enlargement     Acute Sinusitis     Fatigue     Menopause     Lump In / On The Skin     Cough     Nontoxic Solitary Thyroid Nodule     Joint Pain, Localized In The Knee     Obesity     Estimated RMR ([place]-St Jeor equation): 1759 calories  Protein requirements (.5grams to .9grams per pound IBW, 20-30% of calories, minimum of 60-80gm per day):   grams    Progress made since last visit: Pt states she has been able to get out walking most nights with her ; she is tracking intake; slowing down dinner meal and drinking enough water - pt has hit all goals! She is more aware of mindless eating and avoiding it.   Concerns: Discussed balance of nutrition and continued consistency with habits while traveling    Diet Recall/Time:   Breakfast: 2 eggs (14g)   Am Snack: fruit   Lunch: turkey slices OR protein tries to incorporate vegs; cheese stick (21g)   Pm snack:pro bar (20g0   Dinner: Pro/Veg/CHO (21g)   HS Snack: none    Protein: 80g    Typical Snacks: cheese stick, protein bar, fruit     Recommended limiting eating out to no more than 2x/week.  Patient and I reviewed the importance of eating three consistent meals per day; as well as meal timing to be spaced 4-5 hours apart.  Snack choices: 100-150 calories  (1-2x/day if physically hungry), incorporating a fruit/vegetable w/ protein source.    Meal Duration: 20 minutes    Portion Sizes problematic? YES per patient/diet recall  Encouraged slowing meal times down, 20-30 minutes, chewing to applesauce consistency.   To aid in proper portion control and slow meal time down discussed consuming meals off smaller plates, use toddler/children utensils and set utensils down after each bite.    Protein, vegetables/fruits, carbohydrates:   The patient and I discussed the importance of including lean/low fat protein at each meal and limiting carbohydrate intake to less than 25% of plate volume.     Beverages (Type/Oz. per day)  Water: 64oz plus    Discussed the importance of adequate hydration and the goal of 64+ oz of fluid daily.   The patient understands the importance of  avoiding all sweetened and alcoholic drinks, and instead choosing 64 oz plain water.    Exercise  Walking daily 45minutes     Pt's understands that 45-60 minutes of daily activity is an important part of weight loss success.   Encouraged pt to incorporate  strength training exercise in addition to cardiovascular exercise most days of the week.    PES statement:     1. (NC-3.3.5) Obese, class III, BMI ?40 related to physical inactivity as evidenced by Infrequent, low-duration and or low intensity physical activity; and Large amounts of sedentary activities; no structured physical activity regimen    Intervention:  Discussion:  1. Educated pt on Eat Better, Move More, Live Well: Non-surgical Weight Loss Handout  2. Reviewed lean protein sources.  20-25gm protein at 3 meals daily.  grams daily total.  3. Plate Method: The patient and I discussed the importance of including lean/low  fat protein at each meal and limiting carbohydrate intake to less  than 25% of plate volume.  Instructions/Goals:   1. Include 20-25gm protein at each meal.  2. Increase vegetable/fruit intake, by having a vegetable or fruit  with each meal daily. Recommended pt to increase vegetable/fruit intake to 4-5 servings daily.  3. Increase fluid intake to 64oz daily: choose plain or calorie/alcohol-free beverages.  4. Incorporate daily structured activity, 45-60 minutes most days of the week  5. Read food labels more consistently: keeping total fat grams <10, total sugar grams <10, fiber >3gm per serving.   6. Practice plate method: 1/2 plate lean/low fat protein source, vegetable/fruit, <25% of plate complex carbohydrates.  7. Carbohydrates from grain sources at meal times to be no more than 1 Carb Choice, ie: 15-20 gm total carbohydrate per serving  8. Practice eating off of smaller plates/bowls, chewing to applesauce consistency, taking 20-30 minutes to eat in a calm/relaxed environment without distractions of tv/email/cell phone.    Handouts Provided:  Eat Better, Move More, Live Well: Non-surgical Weight Loss Handout - asked for another for at work     Monitor/Evaluation:    Pt will f/u in one month with bariatrician, and f/u in two months with RD.    Plan for next visit with RD:  GOALS:  1) travel coming up - weight loss has helped spur more motivation  2) continue habits of tracking, water and slowing down dinner deal    Time In: 7:55a  Time Out: 8:15a    ABN signed: Yes

## 2021-06-28 ENCOUNTER — HOSPITAL ENCOUNTER (OUTPATIENT)
Dept: CARDIOLOGY | Facility: HOSPITAL | Age: 56
Discharge: HOME | End: 2021-06-28
Attending: INTERNAL MEDICINE
Payer: COMMERCIAL

## 2021-06-28 DIAGNOSIS — R07.2 PRECORDIAL PAIN: ICD-10-CM

## 2021-06-28 DIAGNOSIS — R06.09 DYSPNEA ON EXERTION: ICD-10-CM

## 2021-06-28 LAB
CV STRESS CURRENT BP HE: NORMAL
CV STRESS CURRENT HR HE: 104
CV STRESS CURRENT HR HE: 104
CV STRESS CURRENT HR HE: 106
CV STRESS CURRENT HR HE: 118
CV STRESS CURRENT HR HE: 121
CV STRESS CURRENT HR HE: 121
CV STRESS CURRENT HR HE: 124
CV STRESS CURRENT HR HE: 124
CV STRESS CURRENT HR HE: 69
CV STRESS CURRENT HR HE: 71
CV STRESS CURRENT HR HE: 71
CV STRESS CURRENT HR HE: 77
CV STRESS CURRENT HR HE: 82
CV STRESS CURRENT HR HE: 84
CV STRESS CURRENT HR HE: 84
CV STRESS CURRENT HR HE: 86
CV STRESS CURRENT HR HE: 88
CV STRESS CURRENT HR HE: 90
CV STRESS CURRENT HR HE: 91
CV STRESS CURRENT HR HE: 91
CV STRESS CURRENT HR HE: 92
CV STRESS CURRENT HR HE: 94
CV STRESS CURRENT HR HE: 95
CV STRESS CURRENT HR HE: 95
CV STRESS CURRENT HR HE: 97
CV STRESS CURRENT HR HE: 99
CV STRESS DEVIATION TIME HE: NORMAL
CV STRESS ECHO PERCENT HR HE: NORMAL
CV STRESS EXERCISE STAGE HE: NORMAL
CV STRESS FINAL RESTING BP HE: NORMAL
CV STRESS FINAL RESTING HR HE: 84
CV STRESS MAX HR HE: 124
CV STRESS MAX TREADMILL GRADE HE: 0
CV STRESS MAX TREADMILL SPEED HE: 0
CV STRESS PEAK DIA BP HE: NORMAL
CV STRESS PEAK SYS BP HE: NORMAL
CV STRESS PHASE HE: NORMAL
CV STRESS PROTOCOL HE: NORMAL
CV STRESS RESTING PT POSITION HE: NORMAL
CV STRESS RESTING PT POSITION HE: NORMAL
CV STRESS ST DEVIATION AMOUNT HE: NORMAL
CV STRESS ST DEVIATION ELEVATION HE: NORMAL
CV STRESS ST EVELATION AMOUNT HE: NORMAL
CV STRESS TEST TYPE HE: NORMAL
CV STRESS TOTAL STAGE TIME MIN 1 HE: NORMAL
RATE PRESSURE PRODUCT: NORMAL
STRESS ECHO BASELINE DIASTOLIC HE: 88
STRESS ECHO BASELINE HR: 70
STRESS ECHO BASELINE SYSTOLIC BP: 128
STRESS ECHO CALCULATED PERCENT HR: 77 %
STRESS ECHO LAST STRESS DIASTOLIC BP: 94
STRESS ECHO LAST STRESS HR: 124
STRESS ECHO LAST STRESS SYSTOLIC BP: 162
STRESS ECHO POST ESTIMATED WORKLOAD: 9.1
STRESS ECHO POST EXERCISE DUR MIN: 7
STRESS ECHO POST EXERCISE DUR SEC: 41
STRESS ECHO TARGET HR: 162

## 2021-06-28 NOTE — PROGRESS NOTES
Progress Notes by Nader Guillen MD at 9/25/2019 10:50 AM     Author: Nader Guillen MD Service: -- Author Type: Physician    Filed: 9/25/2019 11:38 AM Encounter Date: 9/25/2019 Status: Signed    : Nader Guillen MD (Physician)           Click to link to [facility] HEART CARE NOTE    Thank you, Dr. Monique, for asking us to see Brenna Fournier at the [facility].      Assessment/Recommendations   Patient with multiple risk factors for coronary artery disease including tobacco abuse, possible hypertension, mild increased LDL cholesterol who has some symptoms which have some atypical and some more typical features.  I do think it would be important to evaluate her heart and have ordered a stress echocardiogram to look for myocardial ischemia, left ventricular wall thickness, and any valvular issues.  We will obtain this in the near future.    I have also recommended that we recheck a fasting lipid panel as it is been sometime and given her symptoms a complete metabolic panel as well as a CBC.    We will call her with the results of the studies and likely will like to see her back in the clinic to review the studies as well as opportunities to reduce her future cardiovascular risk.    Thank you for allowing us to participate in her care.         History of Present Illness    Ms. Brenna Fournier is a 54 y.o. female who is here for evaluation of neck and jaw discomfort as well as elevated blood pressure.  She does not have a known history of heart disease but has been using a weight loss system called [employer] and her blood pressures been checked there and recently has been elevated.  Diastolics have been consistently above 90 and her systolics has been in the 140 range.  She also has had symptoms of jaw discomfort and neck discomfort.  This occurs on the left side of her neck and her left jaw.  It typically will come on when she is driving home from work.  She  will get the discomfort in the last an hour or 2 hours and she does not do any specific therapy.  She feels like she gets a little sweaty with the discomfort but it does not cause her to be short of breath or syncopal.  She does not complain of shortness of breath per se but when she mows the lawn or does other physical activity she can get this discomfort in her left neck.  It then seems to go away when she rests.  She denies orthopnea or paroxysmal nocturnal dyspnea but she snores quite loudly and sometimes wakes herself up snoring.  She had peripheral edema which is fairly consistent but she recently lost 35 pounds and the edema has gone away.  She did have an episode of edema when she returned from a recent trip to [place] but that was brief in nature.  She has not had any syncopal or near syncopal episodes and denies a history of rheumatic fever, cerebrovascular accident, heart murmur, or TIA.     Risk factors for coronary artery disease include tobacco abuse, and possibly hypertension.  Her lipid panel has not been checked for over 5 years.  She is not diabetic.  And she does not have a family history of premature coronary artery disease although her father had high blood pressure but  of colon cancer in his 70s.  Mother is alive and well.    Patient grew up in [facility], and currently works as a certification examiner for optometrist exams.  She likes her job although it stressful primarily because of her current boss.  She is not  but she is been with the same partner for many years who is with her at the time of evaluation.  They also have a black lab but no children.        ECG: Personally reviewed.  ECG is normal today.     Physical Examination Review of Systems   Vitals:    19 1045   BP: 134/82   Pulse: 76   Resp: 16     Body mass index is 32.49 kg/m .  Wt Readings from Last 3 Encounters:   19 220 lb (99.8 kg)   18 (!) 241 lb (109.3 kg)   18 (!) 250 lb  (113.4 kg)     General Appearance:   Alert, cooperative and in no acute distress.   ENT/Mouth: Oral mucuos membranes pink and moist .      EYES:  No scleral icterus. No Xanthelasma.    Neck: JVP normal. No Hepatojugular reflux. Thyroid not visualized   Chest/Lungs:   Lungs are clear to auscultation, equal chest wall expansion    Cardiovascular:   S1, S2 without murmur ,clicks or rubs. Brachial, radial and posterior tibial pulses are intact and symetric. No carotid bruits noted   Abdomen:  Nontender. BS+. No bruits.      Extremities: No cyanosis, clubbing or edema   Skin: no xanthelasma, warm.    Psych: Appropriate affect.   Neurologic: normal gait, normal  bilateral, no tremors        General: Night Sweats  Eyes: WNL  Ears/Nose/Throat: WNL  Lungs: WNL  Heart: Leg Swelling, Chest Pain  Stomach: WNL  Bladder: WNL  Muscle/Joints: WNL  Skin: WNL  Nervous System: WNL  Mental Health: WNL     Blood: WNL     Medical History  Surgical History Family History Social History   No past medical history on file. Past Surgical History:   Procedure Laterality Date   ? [place] KNEE SCOPE,DIAGNOSTIC      Description: Arthroscopy Knee Right;  Recorded: 11/21/2012;   ? VT SHLDR ARTHROSCOP,SURG,W/REMOVAL,LOOSE/FB      Description: Shoulder Arthroscopy;  Recorded: 11/21/2012;    No family history on file. Social History     Socioeconomic History   ? Marital status: Single     Spouse name: Not on file   ? Number of children: Not on file   ? Years of education: Not on file   ? Highest education level: Not on file   Occupational History   ? Not on file   Social Needs   ? Financial resource strain: Not on file   ? Food insecurity:     Worry: Not on file     Inability: Not on file   ? Transportation needs:     Medical: Not on file     Non-medical: Not on file   Tobacco Use   ? Smoking status: Current Every Day Smoker     Packs/day: 0.50   ? Smokeless tobacco: Never Used   Substance and Sexual Activity   ? Alcohol use: Not on file   ? Drug use:  Not on file   ? Sexual activity: Not on file   Lifestyle   ? Physical activity:     Days per week: Not on file     Minutes per session: Not on file   ? Stress: Not on file   Relationships   ? Social connections:     Talks on phone: Not on file     Gets together: Not on file     Attends Caodaism service: Not on file     Active member of club or organization: Not on file     Attends meetings of clubs or organizations: Not on file     Relationship status: Not on file   ? Intimate partner violence:     Fear of current or ex partner: Not on file     Emotionally abused: Not on file     Physically abused: Not on file     Forced sexual activity: Not on file   Other Topics Concern   ? Not on file   Social History Narrative   ? Not on file          Medications  Allergies   Current Outpatient Medications   Medication Sig Dispense Refill   ? DOCOSAHEXANOIC ACID/EPA (FISH OIL ORAL) Take by mouth.     ? magnesium 200 mg Tab Take 200 mg by mouth daily.     ? ascorbic acid, vitamin C, (VITAMIN C) 1000 MG tablet Take 1,000 mg by mouth.     ? cholecalciferol, vitamin D3, (VITAMIN D3) 1,000 unit capsule Take 1,000 Units by mouth daily.     ? FLUoxetine (PROZAC) 40 MG capsule Take 1 capsule (40 mg total) by mouth daily. 90 capsule 3   ? gabapentin (NEURONTIN) 100 MG capsule Take 100 mg by mouth at bedtime. 90 capsule 1   ? levothyroxine (SYNTHROID, LEVOTHROID) 25 MCG tablet Take 1 tablet (25 mcg total) by mouth daily. 90 tablet 1   ? magnesium carbonate, bulk, Powd As directed.     ? multivit 33-mtfolate-nac-chrom 2.5-200-1 mg-mg-mg cap Take by mouth.     ? traZODone (DESYREL) 100 MG tablet Take 1 tablet (100 mg total) by mouth at bedtime. 30 tablet 6     No current facility-administered medications for this visit.       Allergies   Allergen Reactions   ? Diatrizoate Meglumine Itching     5 minutes post CT injection, patient noted her eyes and ears were extremely itchy   ? Esomeprazole Sodium    ? Gluten Itching   ?  Hydrocodone-Acetaminophen    ? Latex    ? Omeprazole          Lab Results    Chemistry/lipid CBC Cardiac Enzymes/BNP/TSH/INR   Lab Results   Component Value Date    CHOL 222 (H) 01/11/2018    HDL 53 01/11/2018    LDLCALC 131 (H) 01/11/2018    TRIG 190 (H) 01/11/2018    CREATININE 0.90 07/05/2018    BUN 26 (H) 09/18/2017    K 4.4 07/05/2018     09/18/2017     09/18/2017    CO2 24 09/18/2017    Lab Results   Component Value Date    WBC 6.8 09/18/2017    HGB 15.6 09/18/2017    HCT 45.4 09/18/2017    MCV 93 09/18/2017     09/18/2017    Lab Results   Component Value Date    TSH 3.58 07/05/2018    INR 1.00 11/13/2014

## 2021-07-02 ENCOUNTER — AMBULATORY - HEALTHEAST (OUTPATIENT)
Dept: CARDIOLOGY | Facility: CLINIC | Age: 56
End: 2021-07-02

## 2021-07-02 DIAGNOSIS — R06.09 DYSPNEA ON EXERTION: ICD-10-CM

## 2021-07-03 NOTE — ADDENDUM NOTE
Addendum Note by Eagle Almazan CMA at 11/18/2019  6:00 PM     Author: Eagle Almazan CMA Service: -- Author Type: Certified Medical Assistant    Filed: 11/18/2019  7:00 PM Encounter Date: 11/18/2019 Status: Signed    : Eagle Almazan CMA (Certified Medical Assistant)    Addended by: EAGLE ALMAZAN on: 11/18/2019 07:00 PM        Modules accepted: Orders

## 2021-07-04 NOTE — PROGRESS NOTES
Progress Notes by Nader Guillen MD at 6/8/2021  7:50 AM     Author: Nader Guillen MD Service: -- Author Type: Physician    Filed: 6/8/2021  8:33 AM Encounter Date: 6/8/2021 Status: Signed    : Nader Guillen MD (Physician)               Assessment/Recommendations   Patient with chest discomfort, shortness of breath, and risk factors for coronary artery disease.  She has had a previous stress test which did not show EKG changes and I think it would be imperative to obtain a repeat stress test with imaging and have recommended a stress echocardiogram.  She is certainly agreeable to this.  Given her risk factors, shortness of breath with activity, chest discomfort and overall reduction in functional capacity, I think it is important to exclude the possibility of significant epicardial coronary artery disease that is flow-limiting.    We will also check a fasting lipid panel as she came in fasting today, as well as a TSH because of the sweating and diaphoresis.  We will check a CBC and metabolic panel because of the sweating and diaphoresis.    If the stress echocardiogram is unremarkable, I would suggest a calcium score to risk stratify her so we know how aggressive to be with her lipids.    If the stress echocardiogram is unremarkable we will encourage her strongly to get back to an exercise routine and either way encouraged her to discontinue tobacco use.    Thank you for allowing us to participate in her care.       History of Present Illness/Subjective    Ms. Brenna Fournier is a 56 y.o. female with known risk factors for coronary artery disease who had a stress test without imaging in 2019 which did not show EKG changes.  She has not felt well over the last 6 to 9 months, maybe even up to 1 year.  She has generalized fatigue, intermittent chest discomfort and tightness shortness of breath with activity.  She admits that she has been more sedentary over the time of the pandemic she is dealing  with some additional stress and that her mother has had some cognitive issues and is is a strong-willed.  She reports that she sweats every night and felt like she went through menopause several years ago.  She gets a chest tightness that is more most common in the evenings and this is also been going on for several months.  She gets that more sharp pain under her left breast intermittently.  She is not certain that either these come on with physical activity and seem to be more when she is resting.  She does have shortness of breath with activity and when going to the wrong clinic this morning, she was advised to walk across a parking lot but decided to drive because it was just too far.    She has not had orthopnea, paroxysmal nocturnal dyspnea but does get peripheral edema which is worse at the end of the day.  She has not had syncopal episodes.           Physical Examination Review of Systems   Vitals:    06/08/21 0800   BP: 132/86   Pulse: 82   Resp: 18   SpO2: 97%     Body mass index is 34.41 kg/m .  Wt Readings from Last 3 Encounters:   06/08/21 (!) 233 lb (105.7 kg)   10/02/20 (!) 231 lb (104.8 kg)   08/07/20 219 lb (99.3 kg)     General Appearance:   Alert, cooperative and in no acute distress.   ENT/Mouth: Patient wearing a mask.      EYES:  no scleral icterus, normal conjunctivae   Neck: JVP normal. No Hepatojugular reflux. Thyroid not visualized.   Chest/Lungs:   Lungs are clear to auscultation, equal chest wall expansion.   Cardiovascular:   S1, S2 without murmur ,clicks or rubs. Brachial, radial and posterior tibial pulses are intact and symetric. No carotid bruits noted   Abdomen:  Nontender. BS+.    Extremities: No cyanosis, clubbing or edema   Skin: no xanthelasma, warm.    Neurologic: normal arm movement bilateral, no tremors     Psychiatric: Appropriate affect.      General: Night Sweats  Eyes: WNL  Ears/Nose/Throat: Nosebleeds  Lungs: Shortness of Breath  Heart: Shortness of Breath with activity,  Leg Swelling  Stomach: WNL  Bladder: WNL  Muscle/Joints: WNL  Skin: WNL  Nervous System: WNL  Mental Health: Anxiety     Blood: WNL       Medical History  Surgical History Family History Social History   Past Medical History:   Diagnosis Date   ? Cystic acne    ? Elevated blood pressure reading without diagnosis of hypertension 9/25/2019   ? Jaw pain    ? Localized pain of knee joint    ? Obesity     Past Surgical History:   Procedure Laterality Date   ? JOINT REPLACEMENT     ? [place] KNEE SCOPE,DIAGNOSTIC      Description: Arthroscopy Knee Right;  Recorded: 11/21/2012;   ? [place] SHLDR ARTHROSCOP,SURG,W/REMOVAL,LOOSE/FB      Description: Shoulder Arthroscopy;  Recorded: 11/21/2012;   ? THYROIDECTOMY, PARTIAL Left 12/17/2019    Procedure: LEFT THYROID LOBECTOMY;  Surgeon: Santosh Doe MD;  Location: [facility];  Service: ENT    Family History   Problem Relation Age of Onset   ? Colon cancer Father     Social History     Socioeconomic History   ? Marital status: Single     Spouse name: Not on file   ? Number of children: Not on file   ? Years of education: Not on file   ? Highest education level: Not on file   Occupational History   ? Not on file   Social Needs   ? Financial resource strain: Not on file   ? Food insecurity     Worry: Not on file     Inability: Not on file   ? Transportation needs     Medical: Not on file     Non-medical: Not on file   Tobacco Use   ? Smoking status: Current Every Day Smoker     Packs/day: 0.50   ? Smokeless tobacco: Never Used   Substance and Sexual Activity   ? Alcohol use: Yes     Comment: twice weekly   ? Drug use: Never   ? Sexual activity: Not on file   Lifestyle   ? Physical activity     Days per week: Not on file     Minutes per session: Not on file   ? Stress: Not on file   Relationships   ? Social connections     Talks on phone: Not on file     Gets together: Not on file     Attends Caodaism service: Not on file     Active member of club or organization: Not on file      Attends meetings of clubs or organizations: Not on file     Relationship status: Not on file   ? Intimate partner violence     Fear of current or ex partner: Not on file     Emotionally abused: Not on file     Physically abused: Not on file     Forced sexual activity: Not on file   Other Topics Concern   ? Not on file   Social History Narrative   ? Not on file          Medications  Allergies   Current Outpatient Medications   Medication Sig Dispense Refill   ? methylcellulose (CITRUCEL) 500 mg Tab Take 1 tablet (500 mg total) by mouth 3 (three) times a day. (Patient taking differently: Take 500 mg by mouth as needed. )  0   ? polyethylene glycol (GLYCOLAX) 17 gram/dose powder Take 12 g by mouth daily. (Patient taking differently: Take 12 g by mouth as needed. )  0   ? dicyclomine (BENTYL) 20 mg tablet Take 1 tablet (20 mg total) by mouth every 6 (six) hours as needed (abdominal pain). 20 tablet 0   ? propranolol (INDERAL) 10 MG tablet Take 1 tablet (10 mg total) by mouth 3 (three) times a day. 30 tablet 2     No current facility-administered medications for this visit.     Allergies   Allergen Reactions   ? Gadolinium-Containing Contrast Media Anaphylaxis and Itching   ? Diatrizoate Meglumine Itching     5 minutes post CT injection, patient noted her eyes and ears were extremely itchy   ? Esomeprazole Sodium    ? Gluten Itching   ? Hydrocodone-Acetaminophen    ? Latex    ? Omeprazole          Lab Results    Chemistry/lipid CBC Cardiac Enzymes/BNP/TSH/INR   Lab Results   Component Value Date    CHOL 191 10/03/2019    HDL 50 10/03/2019    LDLCALC 105 10/03/2019    TRIG 178 (H) 10/03/2019    CREATININE 0.80 08/07/2020    BUN 19 08/07/2020    K 4.2 08/07/2020     08/07/2020     (H) 08/07/2020    CO2 25 08/07/2020    Lab Results   Component Value Date    WBC 5.6 08/07/2020    HGB 14.7 08/07/2020    HCT 42.5 08/07/2020    MCV 92 08/07/2020     08/07/2020    Lab Results   Component Value Date    TSH 1.98  10/03/2019    INR 1.00 11/13/2014

## 2021-07-06 ENCOUNTER — HOSPITAL ENCOUNTER (OUTPATIENT)
Dept: CT IMAGING | Facility: CLINIC | Age: 56
Discharge: HOME | End: 2021-07-06
Attending: INTERNAL MEDICINE
Payer: COMMERCIAL

## 2021-07-06 VITALS
WEIGHT: 233 LBS | DIASTOLIC BLOOD PRESSURE: 86 MMHG | HEART RATE: 82 BPM | OXYGEN SATURATION: 97 % | SYSTOLIC BLOOD PRESSURE: 132 MMHG | RESPIRATION RATE: 18 BRPM | BODY MASS INDEX: 34.41 KG/M2

## 2021-07-06 DIAGNOSIS — R06.09 DYSPNEA ON EXERTION: ICD-10-CM

## 2021-07-07 LAB
CV CALCIUM SCORE AGATSTON LM: 0
CV CALCIUM SCORING AGATSON LAD: 0
CV CALCIUM SCORING AGATSTON CX: 0
CV CALCIUM SCORING AGATSTON RCA: 0
CV CALCIUM SCORING AGATSTON TOTAL: 0

## 2021-07-29 ENCOUNTER — OFFICE VISIT (OUTPATIENT)
Dept: FAMILY MEDICINE | Facility: CLINIC | Age: 56
End: 2021-07-29
Payer: COMMERCIAL

## 2021-07-29 VITALS
WEIGHT: 236.25 LBS | DIASTOLIC BLOOD PRESSURE: 83 MMHG | HEIGHT: 67 IN | SYSTOLIC BLOOD PRESSURE: 138 MMHG | BODY MASS INDEX: 37.08 KG/M2 | HEART RATE: 65 BPM

## 2021-07-29 DIAGNOSIS — R03.0 ELEVATED BLOOD-PRESSURE READING WITHOUT DIAGNOSIS OF HYPERTENSION: ICD-10-CM

## 2021-07-29 DIAGNOSIS — Z12.4 CERVICAL CANCER SCREENING: ICD-10-CM

## 2021-07-29 DIAGNOSIS — F41.0 PANIC ATTACK: Primary | ICD-10-CM

## 2021-07-29 DIAGNOSIS — F51.02 ADJUSTMENT INSOMNIA: ICD-10-CM

## 2021-07-29 DIAGNOSIS — R60.0 BILATERAL LOWER EXTREMITY EDEMA: ICD-10-CM

## 2021-07-29 DIAGNOSIS — R93.89 ABNORMAL FINDING ON IMAGING: ICD-10-CM

## 2021-07-29 DIAGNOSIS — E56.9 VITAMIN DEFICIENCY: ICD-10-CM

## 2021-07-29 DIAGNOSIS — E83.51 HYPOCALCEMIA: ICD-10-CM

## 2021-07-29 PROBLEM — K76.0 HEPATIC STEATOSIS: Status: ACTIVE | Noted: 2021-07-06

## 2021-07-29 PROBLEM — K44.9 HIATAL HERNIA: Status: ACTIVE | Noted: 2021-07-06

## 2021-07-29 LAB
ALBUMIN SERPL-MCNC: 4 G/DL (ref 3.5–5)
ALP SERPL-CCNC: 79 U/L (ref 45–120)
ALT SERPL W P-5'-P-CCNC: 29 U/L (ref 0–45)
ANION GAP SERPL CALCULATED.3IONS-SCNC: 13 MMOL/L (ref 5–18)
AST SERPL W P-5'-P-CCNC: 24 U/L (ref 0–40)
BILIRUB SERPL-MCNC: 0.4 MG/DL (ref 0–1)
BUN SERPL-MCNC: 23 MG/DL (ref 8–22)
CALCIUM SERPL-MCNC: 9.4 MG/DL (ref 8.5–10.5)
CHLORIDE BLD-SCNC: 105 MMOL/L (ref 98–107)
CO2 SERPL-SCNC: 22 MMOL/L (ref 22–31)
CREAT SERPL-MCNC: 0.86 MG/DL (ref 0.6–1.1)
FOLATE SERPL-MCNC: 14.9 NG/ML
GFR SERPL CREATININE-BSD FRML MDRD: 75 ML/MIN/1.73M2
GLUCOSE BLD-MCNC: 94 MG/DL (ref 70–125)
POTASSIUM BLD-SCNC: 4.1 MMOL/L (ref 3.5–5)
PROT SERPL-MCNC: 6.8 G/DL (ref 6–8)
SODIUM SERPL-SCNC: 140 MMOL/L (ref 136–145)

## 2021-07-29 PROCEDURE — 82746 ASSAY OF FOLIC ACID SERUM: CPT | Performed by: FAMILY MEDICINE

## 2021-07-29 PROCEDURE — 99214 OFFICE O/P EST MOD 30 MIN: CPT | Performed by: FAMILY MEDICINE

## 2021-07-29 PROCEDURE — 80053 COMPREHEN METABOLIC PANEL: CPT | Performed by: FAMILY MEDICINE

## 2021-07-29 PROCEDURE — 87624 HPV HI-RISK TYP POOLED RSLT: CPT | Performed by: FAMILY MEDICINE

## 2021-07-29 PROCEDURE — 82306 VITAMIN D 25 HYDROXY: CPT | Performed by: FAMILY MEDICINE

## 2021-07-29 PROCEDURE — G0123 SCREEN CERV/VAG THIN LAYER: HCPCS | Performed by: FAMILY MEDICINE

## 2021-07-29 PROCEDURE — 36415 COLL VENOUS BLD VENIPUNCTURE: CPT | Performed by: FAMILY MEDICINE

## 2021-07-29 RX ORDER — LORAZEPAM 0.5 MG/1
0.5 TABLET ORAL
Qty: 14 TABLET | Refills: 0 | Status: SHIPPED | OUTPATIENT
Start: 2021-07-29 | End: 2021-09-16

## 2021-07-29 RX ORDER — HYDROCHLOROTHIAZIDE 12.5 MG/1
12.5 TABLET ORAL DAILY
Qty: 30 TABLET | Refills: 1 | Status: SHIPPED | OUTPATIENT
Start: 2021-07-29 | End: 2021-12-29

## 2021-07-29 ASSESSMENT — MIFFLIN-ST. JEOR: SCORE: 1688.21

## 2021-07-29 NOTE — PROGRESS NOTES
Assessment & Plan     1. Panic attack  2. Adjustment insomnia  - LORazepam (ATIVAN) 0.5 MG tablet; Take 1 tablet (0.5 mg) by mouth nightly as needed for anxiety or sleep  Dispense: 14 tablet; Refill: 0    Reports symptoms of difficulty sleeping and night time anxiety. Was experiencing chest pain, was evaluated by cardiology and work up negative for ischemia. Thinks having panic episodes overnight. Has tried trazodone in the past and didn't tolerate well, nor was effective.    Will give short term trial of ativan as needed for when she has symptoms overnight or difficulty sleeping. To be used sparingly and not to mix with other sedating substances.    3. Bilateral lower extremity edema  4. Elevated blood-pressure reading without diagnosis of hypertension  - hydrochlorothiazide (HYDRODIURIL) 12.5 MG tablet; Take 1 tablet (12.5 mg) by mouth daily  Dispense: 30 tablet; Refill: 1    Given borderline blood pressure and trace lower extremity edema, will trial hydrochlorothiazide. Normal BMP on 7/29/21. Follow up 1 month nurse visit to recheck BP.     5. Abnormal finding on imaging  CT Calcium score 7/6/21 shows possible enlargement of ascending aorta, incompletely visualized on this study.   Will send message to cardiologist, clarify recommendations for follow up of this finding.    6. Hypocalcemia  - Comprehensive metabolic panel (BMP + Alb, Alk Phos, ALT, AST, Total. Bili, TP)  - Vitamin D Deficiency    Recheck calcium and vitamin D today. Calcium level was a touch low last appointment.     7. Nutrient deficiency  - Folate    Patient desires checking folate level.    8. Cervical cancer screening  - Pap imaged thin layer screen with HPV - recommended age 30 - 65  - HPV High Risk Types DNA Cervical     Pap nurse will be in touch with results.     Return in about 10 months (around 6/8/2022) for Physical Exam.   1 month - recheck BP, swelling, and BMP if regularly using the hydrochlorothiazide.    DO BOBBY Arceo  "[facility]    Renetta Ceja is a 56 year old who presents for the following health issues     HPI     Saw cardiology 6/8/21.   TSH- 4.43  CMP - ALT 46, Ca 8.3,   LIPIDS - TChol 189 / HDL 46 /  /   The 10-year ASCVD risk score (Dima HOBBS Jr., et al., 2013) is: 7.5%    Values used to calculate the score:      Age: 56 years      Sex: Female      Is Non- : No      Diabetic: No      Tobacco smoker: Yes      Systolic Blood Pressure: 138 mmHg      Is BP treated: No      HDL Cholesterol: 46 mg/dL      Total Cholesterol: 189 mg/dL   CBC - normal    6/28/21 - exercise stress: normal, but didn't achieve HR goals  7/6/21 - coronary calcium score: 0    Saw Dr. Guillen, cardiology. Was having chest pain, suspected panic attacks in the middle of the night. Diagnosed with hiatal hernia and fatty liver. Now also has edema in her feet. Needs some help. Saw cardiology 2 years previously as well. Symptoms intermittent for 2 years. Was given something before, thinks beta blocker, didn't take consistently. Didn't help much.     Not sleeping well at baseline.     Hx of partial thyroid removal.     Diaphoresis - trialed gabapentin.    Still smoking, intermittently over 25 years. Has tried Chantix a few times, cold turkey. Has tried patches before. Not consistent.      Hard to sleep - fall asleep and stay asleep. Daytime fatigue. Has tried trazodone in the past.     Colon: last year  Pap: due, will do today     Review of Systems   See HPI above.       Objective    /83   Pulse 65   Ht 1.708 m (5' 7.25\")   LMP  (LMP Unknown)   BMI 36.22 kg/m    Body mass index is 36.22 kg/m .  Physical Exam   GENERAL: healthy, alert and no distress  RESP: lungs clear to auscultation - no rales, rhonchi or wheezes  CV: regular rate and rhythm, normal S1 S2, no S3 or S4, no murmur, click or rub, no peripheral edema and peripheral pulses strong  ABDOMEN: soft, nontender, no " hepatosplenomegaly, no masses and bowel sounds normal  MS: trace bilateral lower extremity edema

## 2021-07-29 NOTE — PATIENT INSTRUCTIONS
Trial hydrochlorothiazide as needed for swelling in your legs.    Trial Ativan at bedtime as needed for panic.    We will recheck labs and be in touch by MyChart with vitamin and calcium levels.    I recommend trialing compression stockings as needed for swelling as well, elevate legs at rest, decrease salt in diet, and increase physical activity.    I will check with cardiology on thoracic aortic imaging and see if further follow-up is needed.    Pap smear nurse will follow up with Pap smear results and follow-up recommendations.

## 2021-07-30 LAB — DEPRECATED CALCIDIOL+CALCIFEROL SERPL-MC: 34 UG/L (ref 30–80)

## 2021-08-04 LAB
HUMAN PAPILLOMA VIRUS 16 DNA: NEGATIVE
HUMAN PAPILLOMA VIRUS 18 DNA: NEGATIVE
HUMAN PAPILLOMA VIRUS FINAL DIAGNOSIS: NORMAL
HUMAN PAPILLOMA VIRUS OTHER HR: NEGATIVE

## 2021-08-09 LAB
BKR LAB AP GYN ADEQUACY: NORMAL
BKR LAB AP GYN INTERPRETATION: NORMAL
BKR LAB AP HPV REFLEX: NORMAL
BKR LAB AP PREVIOUS ABNORMAL: NORMAL
PATH REPORT.COMMENTS IMP SPEC: NORMAL
PATH REPORT.RELEVANT HX SPEC: NORMAL

## 2021-09-10 ENCOUNTER — MYC MEDICAL ADVICE (OUTPATIENT)
Dept: FAMILY MEDICINE | Facility: CLINIC | Age: 56
End: 2021-09-10

## 2021-09-10 DIAGNOSIS — F41.0 PANIC ATTACK: ICD-10-CM

## 2021-09-10 DIAGNOSIS — F51.02 ADJUSTMENT INSOMNIA: ICD-10-CM

## 2021-09-11 ENCOUNTER — HEALTH MAINTENANCE LETTER (OUTPATIENT)
Age: 56
End: 2021-09-11

## 2021-09-16 RX ORDER — LORAZEPAM 1 MG/1
1 TABLET ORAL
Qty: 30 TABLET | Refills: 0 | Status: SHIPPED | OUTPATIENT
Start: 2021-09-16 | End: 2021-12-29

## 2021-09-17 DIAGNOSIS — Q25.40 ABNORMALITY OF THORACIC AORTA: Primary | ICD-10-CM

## 2021-09-17 NOTE — PROGRESS NOTES
Discussed case with radiology. Due to MRI and CT contrast allergies, recommending non-contrast study.  CT chest ordered without contrast.   Aorta normal on CT abdomen/pelvis from 8/7/20.  Attempted to call patient but no answer. Sent [facility] [employer] message with update.    1. Abnormality of thoracic aorta  - CT Chest w/o Contrast; Future     Maru Emerson, DO

## 2021-10-18 ENCOUNTER — MYC MEDICAL ADVICE (OUTPATIENT)
Dept: FAMILY MEDICINE | Facility: CLINIC | Age: 56
End: 2021-10-18

## 2021-10-18 NOTE — TELEPHONE ENCOUNTER
If patient's symptoms have not improved, she should be seen to reassess symptoms and physical exam, possibly CXR.  Maru Emerson, DO

## 2021-10-19 ENCOUNTER — OFFICE VISIT (OUTPATIENT)
Dept: FAMILY MEDICINE | Facility: CLINIC | Age: 56
End: 2021-10-19
Payer: COMMERCIAL

## 2021-10-19 VITALS
HEIGHT: 67 IN | WEIGHT: 236 LBS | DIASTOLIC BLOOD PRESSURE: 87 MMHG | TEMPERATURE: 98.7 F | OXYGEN SATURATION: 96 % | SYSTOLIC BLOOD PRESSURE: 125 MMHG | BODY MASS INDEX: 37.04 KG/M2 | HEART RATE: 67 BPM

## 2021-10-19 DIAGNOSIS — F17.200 CURRENT SMOKER: ICD-10-CM

## 2021-10-19 DIAGNOSIS — J20.9 ACUTE BRONCHITIS, UNSPECIFIED ORGANISM: Primary | ICD-10-CM

## 2021-10-19 LAB
BASOPHILS # BLD AUTO: 0.1 10E3/UL (ref 0–0.2)
BASOPHILS NFR BLD AUTO: 1 %
EOSINOPHIL # BLD AUTO: 0.1 10E3/UL (ref 0–0.7)
EOSINOPHIL NFR BLD AUTO: 1 %
ERYTHROCYTE [DISTWIDTH] IN BLOOD BY AUTOMATED COUNT: 12.6 % (ref 10–15)
HCT VFR BLD AUTO: 43.9 % (ref 35–47)
HGB BLD-MCNC: 14.9 G/DL (ref 11.7–15.7)
IMM GRANULOCYTES # BLD: 0 10E3/UL
IMM GRANULOCYTES NFR BLD: 1 %
LYMPHOCYTES # BLD AUTO: 2.7 10E3/UL (ref 0.8–5.3)
LYMPHOCYTES NFR BLD AUTO: 36 %
MCH RBC QN AUTO: 31.4 PG (ref 26.5–33)
MCHC RBC AUTO-ENTMCNC: 33.9 G/DL (ref 31.5–36.5)
MCV RBC AUTO: 92 FL (ref 78–100)
MONOCYTES # BLD AUTO: 0.8 10E3/UL (ref 0–1.3)
MONOCYTES NFR BLD AUTO: 11 %
NEUTROPHILS # BLD AUTO: 3.9 10E3/UL (ref 1.6–8.3)
NEUTROPHILS NFR BLD AUTO: 51 %
PLATELET # BLD AUTO: 248 10E3/UL (ref 150–450)
RBC # BLD AUTO: 4.75 10E6/UL (ref 3.8–5.2)
WBC # BLD AUTO: 7.6 10E3/UL (ref 4–11)

## 2021-10-19 PROCEDURE — 36415 COLL VENOUS BLD VENIPUNCTURE: CPT | Performed by: FAMILY MEDICINE

## 2021-10-19 PROCEDURE — 85025 COMPLETE CBC W/AUTO DIFF WBC: CPT | Performed by: FAMILY MEDICINE

## 2021-10-19 PROCEDURE — 99214 OFFICE O/P EST MOD 30 MIN: CPT | Performed by: FAMILY MEDICINE

## 2021-10-19 RX ORDER — ALBUTEROL SULFATE 90 UG/1
2 AEROSOL, METERED RESPIRATORY (INHALATION)
COMMUNITY
Start: 2021-10-12 | End: 2021-12-29

## 2021-10-19 RX ORDER — PREDNISONE 20 MG/1
TABLET ORAL
COMMUNITY
Start: 2021-10-12 | End: 2021-12-29

## 2021-10-19 RX ORDER — DOXYCYCLINE HYCLATE 100 MG
100 TABLET ORAL
COMMUNITY
Start: 2021-10-12 | End: 2021-10-19

## 2021-10-19 ASSESSMENT — MIFFLIN-ST. JEOR: SCORE: 1687.08

## 2021-10-19 NOTE — PATIENT INSTRUCTIONS
Patient Education     Acute Bronchitis  Your healthcare provider has told you that you have acute bronchitis. Bronchitis is infection or inflammation of the airways in the lungs (bronchial tubes). Normally, air moves easily in and out of the airways. Bronchitis narrows the airways. This makes it harder for air to flow in and out of the lungs. This causes symptoms such as shortness of breath, coughing up yellow or green mucus, and wheezing.  Bronchitis can be acute or chronic. Acute means it happens quickly and goes away in a short time. Chronic means a condition lasts a long time and often comes back. Most people with acute bronchitis get better in 1 to 2 weeks.     What causes acute bronchitis?  Acute bronchitis is often caused by a virus such as a cold or the flu. In some cases, it may be caused by bacteria. Certain factors make it more likely for a cold or flu to turn into bronchitis. These include being very young, being elderly, having a heart or lung problem, or having a weak immune system. Cigarette smoking also makes bronchitis more likely.  When bronchitis develops, the airways become swollen. The airways may also become infected with bacteria. This is known as a secondary infection.  Symptoms of acute bronchitis  Symptoms can include:    Coughing with mucus    Wheezing    Feeling short of breath    Chest pain    Fever  Diagnosing acute bronchitis  Your healthcare provider will ask about your symptoms and health history. He or she will give you a physical exam. This will include listening to your lungs while you breathe. You may have a chest X-ray to look for infection in the lungs (pneumonia) if you have had a fever. You may also have blood tests to check for infection.  Treating acute bronchitis  Bronchitis usually goes away in 1 to 2 weeks without treatment. You can help feel better by:    Taking medicine as directed. Talk to your healthcare provider before taking any over-the-counter medicines (OTC).  Some OTC medicines help relieve inflammation in your bronchial tubes. They can also thin mucus. This makes it easier to cough up. Your healthcare provider may prescribe an inhaler to help open up the bronchial tubes. Most of the time, acute bronchitis is caused by a viral infection. Antibiotics are usually not prescribed for viral infections.    Drinking plenty of fluids, such as water, juice, or warm soup. Fluids loosen mucus so that you can cough it up. This helps you breathe more easily. Fluids also prevent dehydration.    Using a humidifier. This can help reduce coughing.    Getting plenty of rest    Not smoking. Also, don't let anyone else smoke in your home. In public places, move away from secondhand smoke.  Recovery and follow-up  Follow up with your doctor. You will likely feel better in 1 to 2 weeks. But you may have a dry cough for a longer time. Let your doctor know if you still have symptoms other than a dry cough after 2 weeks. Tell him or her if you get bronchial infections often.  Self-care tips  To get relief from your symptoms and prevent bronchitis:    Stop smoking. Stopping smoking is the most important step you can take to treat bronchitis. If you need help stopping smoking, talk with your healthcare provider.    Stay away from secondhand smoke and other irritants. Try to stay away from smoke, chemicals, fumes, and dust. Don t let anyone smoke in your home. Stay indoors on smoggy days.    Prevent lung infections. Ask your healthcare provider about the flu and pneumonia vaccines. Take steps to prevent colds and other lung infections.    Wash your hands well. Wash your hands often with soap and water. Use hand  when you can t wash your hands. Stay away from crowds during cold and flu season.  When to call your healthcare provider  Call the healthcare provider if you have any of these:    Fever of 100.4 F ( 38.0 C) or higher, or as directed by your healthcare provider    Symptoms that get  worse, or new symptoms    Breathing not getting better with treatments    Symptoms that don t start to get better in 1 week  [employer] last reviewed this educational content on 8/1/2019 2000-2021 [employer]. All rights reserved. This information is not intended as a substitute for professional medical care. Always follow your healthcare professional's instructions.

## 2021-10-19 NOTE — PROGRESS NOTES
"    Assessment & Plan     ICD-10-CM    1. Acute bronchitis, unspecified organism  J20.9 CBC with platelets and differential     XR Chest 2 Views     CBC with platelets and differential   2. Current smoker  F17.200      Medication making: Cough in a smoker treated with the doxycycline that she has taken for 5-1/2 days.  No fevers.  Covid has been negative.  She is currently using her albuterol inhaler.  Testing as above.  X-ray was reviewed by me and does not show any pneumonia or any pleural effusion.  Her white count cell is normal and does not indicate any acute infection.  At this time patient will continue to monitor her symptoms and use inhaler as needed.  In the long-term she would benefit from pulmonary function test and/or referral to pulmonology.  In addition we addressed screening for lung cancer and future order CT scan if symptoms persist.  I have asked her to follow-up with PCP in a couple of weeks.  She is informed me that she plans to follow-up with Dr. Maru Emerson       Tobacco Cessation:   reports that she has been smoking. She has been smoking about 0.50 packs per day. She has never used smokeless tobacco.  Tobacco Cessation Action Plan: Discussed this with patient    BMI:   Estimated body mass index is 36.69 kg/m  as calculated from the following:    Height as of this encounter: 1.708 m (5' 7.25\").    Weight as of this encounter: 107 kg (236 lb).       MEDICATIONS:  Continue current medications without change  See Patient Instructions    Return in about 2 weeks (around 11/2/2021) for Follow up.    Dayo Zaragoza MD  [facility]    Subjective    Chief Complaint   Patient presents with     Cough     Ongoing cough, X2 weeks, pt was seen at urgent care last week- everything was negative there. Was told it was bronchitis, got meds for this, pt got was so dizzy and had to stop taking meds.        Brenna is a 56 year old who presents for the following health issues     HPI " "patient has had cough for 2 weeks.  She is a smoker.  She went to emergency room and was evaluated and it was thought to be bronchitis versus COPD exacerbation and she was treated with doxycycline, prednisone and albuterol as needed.  Overall, she feels she has had some improvement but she does have persistent cough with mucus production.  She stopped doxycycline a day ago and has 3 pills left from a 7-day course.  She also stopped her prednisone as she was having side effect of dizziness.  She does continue to use albuterol with some benefit.  She was asked to follow-up with PCP for x-ray as it was nonfunctional emergency room.  Her Covid testing was negative.  She denies any fevers.  She denies any nausea/vomiting or lack of appetite.  She does continue to smoke although she has not smoked in the last couple of weeks.      Review of Systems   Constitutional, HEENT, cardiovascular, pulmonary, gi and gu systems are negative, except as otherwise noted.      Objective    /87 (BP Location: Left arm, Patient Position: Sitting, Cuff Size: Adult Large)   Pulse 67   Temp 98.7  F (37.1  C) (Oral)   Ht 1.708 m (5' 7.25\")   Wt 107 kg (236 lb)   LMP  (LMP Unknown)   SpO2 96%   BMI 36.69 kg/m    Body mass index is 36.69 kg/m .  Physical Exam   GENERAL: healthy, alert and no distress  NECK: no adenopathy, no asymmetry, masses, or scars and thyroid normal to palpation  RESP: lungs clear to auscultation - no rales, rhonchi or wheezes  CV: regular rate and rhythm, normal S1 S2, no S3 or S4, no murmur, click or rub, no peripheral edema and peripheral pulses strong  ABDOMEN: soft, nontender, no hepatosplenomegaly, no masses and bowel sounds normal  MS: no gross musculoskeletal defects noted, no edema    Results for orders placed or performed in visit on 10/19/21 (from the past 24 hour(s))   CBC with platelets and differential    Narrative    The following orders were created for panel order CBC with platelets and " differential.  Procedure                               Abnormality         Status                     ---------                               -----------         ------                     CBC with platelets and d...[[id number]]                      Final result                 Please view results for these tests on the individual orders.   CBC with platelets and differential   Result Value Ref Range    WBC Count 7.6 4.0 - 11.0 10e3/uL    RBC Count 4.75 3.80 - 5.20 10e6/uL    Hemoglobin 14.9 11.7 - 15.7 g/dL    Hematocrit 43.9 35.0 - 47.0 %    MCV 92 78 - 100 fL    MCH 31.4 26.5 - 33.0 pg    MCHC 33.9 31.5 - 36.5 g/dL    RDW 12.6 10.0 - 15.0 %    Platelet Count 248 150 - 450 10e3/uL    % Neutrophils 51 %    % Lymphocytes 36 %    % Monocytes 11 %    % Eosinophils 1 %    % Basophils 1 %    % Immature Granulocytes 1 %    Absolute Neutrophils 3.9 1.6 - 8.3 10e3/uL    Absolute Lymphocytes 2.7 0.8 - 5.3 10e3/uL    Absolute Monocytes 0.8 0.0 - 1.3 10e3/uL    Absolute Eosinophils 0.1 0.0 - 0.7 10e3/uL    Absolute Basophils 0.1 0.0 - 0.2 10e3/uL    Absolute Immature Granulocytes 0.0 <=0.0 10e3/uL

## 2021-11-22 ENCOUNTER — MYC MEDICAL ADVICE (OUTPATIENT)
Dept: FAMILY MEDICINE | Facility: CLINIC | Age: 56
End: 2021-11-22
Payer: COMMERCIAL

## 2021-12-15 ENCOUNTER — TRANSFERRED RECORDS (OUTPATIENT)
Dept: HEALTH INFORMATION MANAGEMENT | Facility: CLINIC | Age: 56
End: 2021-12-15
Payer: COMMERCIAL

## 2021-12-21 ENCOUNTER — TRANSFERRED RECORDS (OUTPATIENT)
Dept: HEALTH INFORMATION MANAGEMENT | Facility: CLINIC | Age: 56
End: 2021-12-21
Payer: COMMERCIAL

## 2021-12-29 ENCOUNTER — OFFICE VISIT (OUTPATIENT)
Dept: FAMILY MEDICINE | Facility: CLINIC | Age: 56
End: 2021-12-29
Payer: COMMERCIAL

## 2021-12-29 VITALS
DIASTOLIC BLOOD PRESSURE: 91 MMHG | HEART RATE: 70 BPM | HEIGHT: 67 IN | BODY MASS INDEX: 37.39 KG/M2 | WEIGHT: 238.25 LBS | SYSTOLIC BLOOD PRESSURE: 138 MMHG

## 2021-12-29 DIAGNOSIS — M54.50 ACUTE BILATERAL LOW BACK PAIN WITHOUT SCIATICA: ICD-10-CM

## 2021-12-29 DIAGNOSIS — Z23 IMMUNIZATION DUE: Primary | ICD-10-CM

## 2021-12-29 DIAGNOSIS — Z71.6 ENCOUNTER FOR TOBACCO USE CESSATION COUNSELING: ICD-10-CM

## 2021-12-29 PROBLEM — Z86.0100 HISTORY OF COLONIC POLYPS: Status: ACTIVE | Noted: 2020-07-23

## 2021-12-29 PROBLEM — K57.30 DIVERTICULAR DISEASE OF LARGE INTESTINE: Status: ACTIVE | Noted: 2020-07-23

## 2021-12-29 PROBLEM — D12.2 BENIGN NEOPLASM OF ASCENDING COLON: Status: ACTIVE | Noted: 2020-07-27

## 2021-12-29 PROCEDURE — 0004A COVID-19,PF,PFIZER (12+ YRS): CPT | Performed by: FAMILY MEDICINE

## 2021-12-29 PROCEDURE — 99213 OFFICE O/P EST LOW 20 MIN: CPT | Mod: 25 | Performed by: FAMILY MEDICINE

## 2021-12-29 PROCEDURE — 90471 IMMUNIZATION ADMIN: CPT | Performed by: FAMILY MEDICINE

## 2021-12-29 PROCEDURE — 91300 COVID-19,PF,PFIZER (12+ YRS): CPT | Performed by: FAMILY MEDICINE

## 2021-12-29 PROCEDURE — 90682 RIV4 VACC RECOMBINANT DNA IM: CPT | Performed by: FAMILY MEDICINE

## 2021-12-29 RX ORDER — TIZANIDINE 2 MG/1
2 TABLET ORAL EVERY 8 HOURS PRN
Qty: 60 TABLET | Refills: 0 | Status: SHIPPED | OUTPATIENT
Start: 2021-12-29 | End: 2022-05-17

## 2021-12-29 RX ORDER — NICOTINE 21 MG/24HR
1 PATCH, TRANSDERMAL 24 HOURS TRANSDERMAL EVERY 24 HOURS
Qty: 42 PATCH | Refills: 0 | Status: SHIPPED | OUTPATIENT
Start: 2021-12-29 | End: 2022-02-09

## 2021-12-29 RX ORDER — PREGABALIN 75 MG/1
75 CAPSULE ORAL 2 TIMES DAILY
COMMUNITY
Start: 2021-12-21 | End: 2022-05-17

## 2021-12-29 RX ORDER — METHOCARBAMOL 500 MG/1
TABLET, FILM COATED ORAL
COMMUNITY
Start: 2021-12-21 | End: 2022-05-17

## 2021-12-29 RX ORDER — TIZANIDINE 2 MG/1
TABLET ORAL
COMMUNITY
Start: 2021-12-15 | End: 2021-12-29

## 2021-12-29 ASSESSMENT — MIFFLIN-ST. JEOR: SCORE: 1697.28

## 2022-01-18 ENCOUNTER — TRANSFERRED RECORDS (OUTPATIENT)
Dept: HEALTH INFORMATION MANAGEMENT | Facility: CLINIC | Age: 57
End: 2022-01-18
Payer: COMMERCIAL

## 2022-02-02 ENCOUNTER — TRANSFERRED RECORDS (OUTPATIENT)
Dept: HEALTH INFORMATION MANAGEMENT | Facility: CLINIC | Age: 57
End: 2022-02-02
Payer: COMMERCIAL

## 2022-03-09 NOTE — TELEPHONE ENCOUNTER
----- Message -----  From: Nader Guillen MD  Sent: 11/19/2019  11:28 AM CST  To: Maru Lan RN    Regular GXT    ----- Message -----  From: Maru Lan RN  Sent: 11/19/2019   8:56 AM CST  To: Nader Guillen MD    ----- Message from Maru Lan RN sent at 11/19/2019  8:56 AM CST -----  Dr. Guillen,    Patient needs cardiac clearance prior to thyroid surgery. Will not get clearance without having Stress Echo that was denied by [employer]. Appeal I filed was also denied. What else would you recommend for patient?    Maru       Modify Regimen: Absorica 40 mg capsule take 1 capsule PO BID\\n Render In Strict Bullet Format?: No Detail Level: Zone

## 2022-03-25 ENCOUNTER — TRANSFERRED RECORDS (OUTPATIENT)
Dept: HEALTH INFORMATION MANAGEMENT | Facility: CLINIC | Age: 57
End: 2022-03-25
Payer: COMMERCIAL

## 2022-05-04 ENCOUNTER — TRANSFERRED RECORDS (OUTPATIENT)
Dept: HEALTH INFORMATION MANAGEMENT | Facility: CLINIC | Age: 57
End: 2022-05-04
Payer: COMMERCIAL

## 2022-05-11 DIAGNOSIS — Z11.59 ENCOUNTER FOR SCREENING FOR OTHER VIRAL DISEASES: Primary | ICD-10-CM

## 2022-05-17 ENCOUNTER — OFFICE VISIT (OUTPATIENT)
Dept: FAMILY MEDICINE | Facility: CLINIC | Age: 57
End: 2022-05-17
Payer: COMMERCIAL

## 2022-05-17 VITALS
TEMPERATURE: 98.9 F | WEIGHT: 237.8 LBS | HEART RATE: 76 BPM | BODY MASS INDEX: 36.04 KG/M2 | DIASTOLIC BLOOD PRESSURE: 89 MMHG | SYSTOLIC BLOOD PRESSURE: 131 MMHG | RESPIRATION RATE: 10 BRPM | OXYGEN SATURATION: 98 % | HEIGHT: 68 IN

## 2022-05-17 DIAGNOSIS — E04.1 NONTOXIC UNINODULAR GOITER: ICD-10-CM

## 2022-05-17 DIAGNOSIS — Z01.818 PREOP GENERAL PHYSICAL EXAM: Primary | ICD-10-CM

## 2022-05-17 DIAGNOSIS — F17.200 TOBACCO DEPENDENCE: ICD-10-CM

## 2022-05-17 DIAGNOSIS — M48.062 SPINAL STENOSIS OF LUMBAR REGION WITH NEUROGENIC CLAUDICATION: ICD-10-CM

## 2022-05-17 LAB
ALBUMIN SERPL-MCNC: 4 G/DL (ref 3.5–5)
ALP SERPL-CCNC: 94 U/L (ref 45–120)
ALT SERPL W P-5'-P-CCNC: 27 U/L (ref 0–45)
ANION GAP SERPL CALCULATED.3IONS-SCNC: 10 MMOL/L (ref 5–18)
APTT PPP: 31 SECONDS (ref 22–38)
AST SERPL W P-5'-P-CCNC: 26 U/L (ref 0–40)
ATRIAL RATE - MUSE: 77 BPM
BILIRUB SERPL-MCNC: 0.7 MG/DL (ref 0–1)
BUN SERPL-MCNC: 22 MG/DL (ref 8–22)
CALCIUM SERPL-MCNC: 8.9 MG/DL (ref 8.5–10.5)
CHLORIDE BLD-SCNC: 105 MMOL/L (ref 98–107)
CO2 SERPL-SCNC: 26 MMOL/L (ref 22–31)
CREAT SERPL-MCNC: 0.84 MG/DL (ref 0.6–1.1)
DIASTOLIC BLOOD PRESSURE - MUSE: NORMAL MMHG
ERYTHROCYTE [DISTWIDTH] IN BLOOD BY AUTOMATED COUNT: 12.1 % (ref 10–15)
GFR SERPL CREATININE-BSD FRML MDRD: 80 ML/MIN/1.73M2
GLUCOSE BLD-MCNC: 89 MG/DL (ref 70–125)
HCT VFR BLD AUTO: 48 % (ref 35–47)
HGB BLD-MCNC: 16.1 G/DL (ref 11.7–15.7)
INR PPP: 1.03 (ref 0.85–1.15)
INTERPRETATION ECG - MUSE: NORMAL
MCH RBC QN AUTO: 31.3 PG (ref 26.5–33)
MCHC RBC AUTO-ENTMCNC: 33.5 G/DL (ref 31.5–36.5)
MCV RBC AUTO: 93 FL (ref 78–100)
P AXIS - MUSE: 46 DEGREES
PLATELET # BLD AUTO: 237 10E3/UL (ref 150–450)
POTASSIUM BLD-SCNC: 4.5 MMOL/L (ref 3.5–5)
PR INTERVAL - MUSE: 180 MS
PROT SERPL-MCNC: 6.8 G/DL (ref 6–8)
QRS DURATION - MUSE: 88 MS
QT - MUSE: 410 MS
QTC - MUSE: 463 MS
R AXIS - MUSE: 35 DEGREES
RBC # BLD AUTO: 5.15 10E6/UL (ref 3.8–5.2)
SODIUM SERPL-SCNC: 141 MMOL/L (ref 136–145)
SYSTOLIC BLOOD PRESSURE - MUSE: NORMAL MMHG
T AXIS - MUSE: 36 DEGREES
TSH SERPL DL<=0.005 MIU/L-ACNC: 3.63 UIU/ML (ref 0.3–5)
VENTRICULAR RATE- MUSE: 77 BPM
WBC # BLD AUTO: 7 10E3/UL (ref 4–11)

## 2022-05-17 PROCEDURE — 93010 ELECTROCARDIOGRAM REPORT: CPT | Performed by: INTERNAL MEDICINE

## 2022-05-17 PROCEDURE — 80053 COMPREHEN METABOLIC PANEL: CPT | Performed by: FAMILY MEDICINE

## 2022-05-17 PROCEDURE — 36415 COLL VENOUS BLD VENIPUNCTURE: CPT | Performed by: FAMILY MEDICINE

## 2022-05-17 PROCEDURE — 93005 ELECTROCARDIOGRAM TRACING: CPT | Performed by: FAMILY MEDICINE

## 2022-05-17 PROCEDURE — 85027 COMPLETE CBC AUTOMATED: CPT | Performed by: FAMILY MEDICINE

## 2022-05-17 PROCEDURE — 99215 OFFICE O/P EST HI 40 MIN: CPT | Performed by: FAMILY MEDICINE

## 2022-05-17 PROCEDURE — 84443 ASSAY THYROID STIM HORMONE: CPT | Performed by: FAMILY MEDICINE

## 2022-05-17 PROCEDURE — 85730 THROMBOPLASTIN TIME PARTIAL: CPT | Performed by: FAMILY MEDICINE

## 2022-05-17 PROCEDURE — 85610 PROTHROMBIN TIME: CPT | Performed by: FAMILY MEDICINE

## 2022-05-17 RX ORDER — CYCLOBENZAPRINE HCL 10 MG
TABLET ORAL
COMMUNITY
Start: 2022-05-04 | End: 2022-05-17

## 2022-05-17 RX ORDER — OXYCODONE AND ACETAMINOPHEN 5; 325 MG/1; MG/1
1 TABLET ORAL 3 TIMES DAILY PRN
Qty: 21 TABLET | Refills: 0 | Status: ON HOLD | OUTPATIENT
Start: 2022-05-17 | End: 2022-05-25

## 2022-05-17 RX ORDER — PREGABALIN 150 MG/1
150 CAPSULE ORAL 2 TIMES DAILY
COMMUNITY
Start: 2022-01-18 | End: 2022-05-17

## 2022-05-17 RX ORDER — TRAMADOL HYDROCHLORIDE 50 MG/1
TABLET ORAL
COMMUNITY
Start: 2022-01-18 | End: 2022-05-17

## 2022-05-17 RX ORDER — OXYCODONE AND ACETAMINOPHEN 5; 325 MG/1; MG/1
TABLET ORAL
COMMUNITY
Start: 2022-05-04 | End: 2022-05-17

## 2022-05-17 RX ORDER — BACLOFEN 10 MG/1
TABLET ORAL
COMMUNITY
Start: 2022-04-20 | End: 2022-05-17

## 2022-05-17 NOTE — H&P (VIEW-ONLY)
[facility]  [address] [facility] [address]  Phone: [phone]  Fax: [phone]  Primary Provider: Maru Emerson  Pre-op Performing Provider: DREW COTE        PREOPERATIVE EVALUATION:  Today's date: 5/17/2022    Brenna Fournier is a 57 year old female who presents for a preoperative evaluation.    Surgical Information:  Surgery/Procedure:  Lumbar 2-Lumbar 3, Lumbar 3-Lumbar 4, Lumbar Decompression       Lumbar 2-5 cleaned up  Surgery Location: [facility]  Surgeon: Dr. Carter  Surgery Date: 05-  Time of Surgery: 10am  Where patient plans to recover: At home with family  Fax number for surgical facility: Note does not need to be faxed, will be available electronically in Epic.    Type of Anesthesia Anticipated: General    Assessment & Plan     The proposed surgical procedure is considered INTERMEDIATE risk.    Preop general physical exam  Patient is cleared to proceed with surgery for L2-L5 decompression surgery for spinal stenosis.  The following labs have been placed and are within normal limits.  She will return within 5 days prior for her COVID 19 screening.  No active symptoms or signs of infection.  EKG is within normal limits with no concerning cardiac history.  She actually had a full cardiac work-up recently and included stress testing and CT calcium score of 0 in the last year.  She is aware of the importance of tobacco cessation on her healing process and is hoping that she will be at that point after surgery.  She was given a limited supply of oxycodone to help with nighttime sleep for pain until after the surgery.  I reviewed  and she has not had excessive prescriptions for controlled substances.  I gave her a week supply to last her until the point of her surgery.  - Comprehensive metabolic panel (BMP + Alb, Alk Phos, ALT, AST, Total. Bili, TP); Future  - CBC with platelets; Future  - EKG 12-lead, tracing only  - INR;  Future  - Partial thromboplastin time; Future  - Comprehensive metabolic panel (BMP + Alb, Alk Phos, ALT, AST, Total. Bili, TP)  - CBC with platelets  - INR  - Partial thromboplastin time  - oxyCODONE-acetaminophen (PERCOCET) 5-325 MG tablet; Take 1 tablet by mouth 3 times daily as needed for severe pain    Spinal stenosis of lumbar region with neurogenic claudication  -As indicated above.  Surgery planned.  Pain control given for a 7-day course of only 21 tablets until surgery.  I also filled out a disability permit for 6 months.  She is aware of stool softeners that she should be taking prior  - oxyCODONE-acetaminophen (PERCOCET) 5-325 MG tablet; Take 1 tablet by mouth 3 times daily as needed for severe pain    Nontoxic Solitary Thyroid Nodule    - TSH with free T4 reflex    Tobacco dependence  -Patient was counseled on tobacco cessation and has plans for quitting in the near future and needs no prescription management per patient           Risks and Recommendations:  The patient has the following additional risks and recommendations for perioperative complications:   - No identified additional risk factors other than previously addressed    Medication Instructions:  Patient is on no chronic medications    RECOMMENDATION:  APPROVAL GIVEN to proceed with proposed procedure, without further diagnostic evaluation.    Review of external notes as documented above           40 minutes spent on the date of the encounter doing chart review, history and exam, documentation and further activities per the note         Subjective     HPI related to upcoming procedure:   Severe pain in low back since November due to spinal stenosis -radicular symptoms and weakness down leg.   Completely out of oxycodone- taking at night for sleep but just ran out. Reviewed   Lyrica, flexeril, muscle relaxers not helpful  Muscles were cramping so much  Hasnt started tobacco cessation./Very motivated to quit but has had a lot of stressors  with her health and her significant other's health in the last few months.    No previous issues with surgery.   Will be working from home.   Does not have disability parking permit.  Indicates it would be helpful.  Preop Questions 5/16/2022   1. Have you ever had a heart attack or stroke? No   2. Have you ever had surgery on your heart or blood vessels, such as a stent placement, a coronary artery bypass, or surgery on an artery in your head, neck, heart, or legs? No   3. Do you have chest pain with activity? No   4. Do you have a history of  heart failure? No   5. Do you currently have a cold, bronchitis or symptoms of other infection? No   6. Do you have a cough, shortness of breath, or wheezing? No   7. Do you or anyone in your family have previous history of blood clots? No   8. Do you or does anyone in your family have a serious bleeding problem such as prolonged bleeding following surgeries or cuts? No   9. Have you ever had problems with anemia or been told to take iron pills? No   10. Have you had any abnormal blood loss such as black, tarry or bloody stools, or abnormal vaginal bleeding? No   11. Have you ever had a blood transfusion? No   12. Are you willing to have a blood transfusion if it is medically needed before, during, or after your surgery? Yes   13. Have you or any of your relatives ever had problems with anesthesia? No   14. Do you have sleep apnea, excessive snoring or daytime drowsiness? No   15. Do you have any artifical heart valves or other implanted medical devices like a pacemaker, defibrillator, or continuous glucose monitor? No   16. Do you have artificial joints? YES - right knee    17. Are you allergic to latex? YES:  Redness and itching/blister   18. Is there any chance that you may be pregnant? No       Health Care Directive:  Patient does not have a Health Care Directive or Living Will: Advance Directive received and scanned. Click on Code in the patient header to  view.    Preoperative Review of :   reviewed - controlled substances reflected in medication list.       Review of Systems  Review of Systems  Complete ROS reviewed in HPI or otherwise negative      Patient Active Problem List    Diagnosis Date Noted     Spinal stenosis of lumbar region with neurogenic claudication 05/19/2022     Priority: Medium     Tobacco dependence 05/19/2022     Priority: Medium     Low calcium levels 07/06/2021     Priority: Medium     Hiatal hernia 07/06/2021     Priority: Medium     Irritable bowel syndrome with both constipation and diarrhea 10/10/2020     Priority: Medium     Benign neoplasm of ascending colon 07/27/2020     Priority: Medium     History of colonic polyps 07/23/2020     Priority: Medium     Thyroid Diffuse Enlargement      Priority: Medium     Nontoxic Solitary Thyroid Nodule      Priority: Medium     Joint Pain, Localized In The Knee      Priority: Medium     Obesity      Priority: Medium      Past Medical History:   Diagnosis Date     Cystic acne      Elevated blood pressure reading without diagnosis of hypertension 09/25/2019     Jaw pain      Localized pain of knee joint      Obesity      Past Surgical History:   Procedure Laterality Date     COLONOSCOPY  6/2020      KNEE SCOPE, DIAGNOSTIC      Description: Arthroscopy Knee Right;  Recorded: 11/21/2012;      SHLDR ARTHROSCOP,SURG,W/REMOVAL,LOOSE/FB      Description: Shoulder Arthroscopy;  Recorded: 11/21/2012;     JOINT REPLACEMENT       THYROIDECTOMY, PARTIAL Left 12/17/2019    Procedure: LEFT THYROID LOBECTOMY;  Surgeon: Santosh Doe MD;  Location: [facility];  Service: ENT     TRANSPLANT  2013    Knee Replacement     Current Outpatient Medications   Medication Sig Dispense Refill     Cyanocobalamin (B-12 PO)        MAGNESIUM PO        Multiple Vitamins-Minerals (MULTIVITAMIN ADULTS PO)        oxyCODONE-acetaminophen (PERCOCET) 5-325 MG tablet Take 1 tablet by mouth 3 times daily as needed for  "severe pain 21 tablet 0     POTASSIUM PO          Allergies   Allergen Reactions     Gadolinium-Containing Contrast Media [Gadolinium Derivatives] Anaphylaxis and Itching     Diatrizoate Meglumine [Diatrizoate] Itching     5 minutes post CT injection, patient noted her eyes and ears were extremely itchy     Esomeprazole Sodium [Esomeprazole] Unknown     Swelling     Gluten [Gluten Meal] Itching     Hydrocodone-Acetaminophen Unknown     Hyperactivity      Latex Unknown     Omeprazole Unknown     Swelling- ankles        Social History     Tobacco Use     Smoking status: Current Every Day Smoker     Packs/day: 0.50     Years: 25.00     Pack years: 12.50     Types: Cigarettes     Smokeless tobacco: Never Used     Tobacco comment: Have stopped off and on   Substance Use Topics     Alcohol use: Yes     Comment: Alcoholic Drinks/day: twice weekly     Family History   Problem Relation Age of Onset     Colon Cancer Father 78     Hypertension Father      Heart Disease Other      Cerebrovascular Disease Other      History   Drug Use Unknown         Objective     /89   Pulse 76   Temp 98.9  F (37.2  C) (Oral)   Resp 10   Ht 1.721 m (5' 7.75\")   Wt 107.9 kg (237 lb 12.8 oz)   LMP  (LMP Unknown)   SpO2 98%   BMI 36.42 kg/m      Physical Exam    GENERAL APPEARANCE: healthy, alert and no distress.  Moves around slowly with discomfort.     EYES: EOMI, PERRL     HENT: ear canals and TM's normal and nose and mouth without ulcers or lesions     NECK: no adenopathy, no asymmetry, masses, or scars and thyroid normal to palpation     RESP: lungs clear to auscultation - no rales, rhonchi or wheezes     CV: regular rates and rhythm, normal S1 S2, no S3 or S4 and no murmur, click or rub     ABDOMEN:  soft, nontender, no HSM or masses and bowel sounds normal     MS: extremities normal- no gross deformities noted, no evidence of inflammation in joints, FROM in all extremities.     SKIN: no suspicious lesions or rashes     NEURO: " Normal strength and tone, sensory exam grossly normal, mentation intact and speech normal     PSYCH: mentation appears normal. and affect normal/bright     LYMPHATICS: No cervical adenopathy    Recent Labs   Lab Test 10/19/21  1129 07/29/21  1654 06/08/21  0840   HGB 14.9  --  14.9     --  226   NA  --  140 141   POTASSIUM  --  4.1 4.1   CR  --  0.86 0.84        Diagnostics:  Recent Results (from the past 720 hour(s))   Comprehensive metabolic panel (BMP + Alb, Alk Phos, ALT, AST, Total. Bili, TP)    Collection Time: 05/17/22 11:05 AM   Result Value Ref Range    Sodium 141 136 - 145 mmol/L    Potassium 4.5 3.5 - 5.0 mmol/L    Chloride 105 98 - 107 mmol/L    Carbon Dioxide (CO2) 26 22 - 31 mmol/L    Anion Gap 10 5 - 18 mmol/L    Urea Nitrogen 22 8 - 22 mg/dL    Creatinine 0.84 0.60 - 1.10 mg/dL    Calcium 8.9 8.5 - 10.5 mg/dL    Glucose 89 70 - 125 mg/dL    Alkaline Phosphatase 94 45 - 120 U/L    AST 26 0 - 40 U/L    ALT 27 0 - 45 U/L    Protein Total 6.8 6.0 - 8.0 g/dL    Albumin 4.0 3.5 - 5.0 g/dL    Bilirubin Total 0.7 0.0 - 1.0 mg/dL    GFR Estimate 80 >60 mL/min/1.73m2   CBC with platelets    Collection Time: 05/17/22 11:05 AM   Result Value Ref Range    WBC Count 7.0 4.0 - 11.0 10e3/uL    RBC Count 5.15 3.80 - 5.20 10e6/uL    Hemoglobin 16.1 (H) 11.7 - 15.7 g/dL    Hematocrit 48.0 (H) 35.0 - 47.0 %    MCV 93 78 - 100 fL    MCH 31.3 26.5 - 33.0 pg    MCHC 33.5 31.5 - 36.5 g/dL    RDW 12.1 10.0 - 15.0 %    Platelet Count 237 150 - 450 10e3/uL   INR    Collection Time: 05/17/22 11:05 AM   Result Value Ref Range    INR 1.03 0.85 - 1.15   Partial thromboplastin time    Collection Time: 05/17/22 11:05 AM   Result Value Ref Range    aPTT 31 22 - 38 Seconds   TSH with free T4 reflex    Collection Time: 05/17/22 11:05 AM   Result Value Ref Range    TSH 3.63 0.30 - 5.00 uIU/mL   EKG 12-lead, tracing only    Collection Time: 05/17/22 11:30 AM   Result Value Ref Range    Systolic Blood Pressure  mmHg    Diastolic  Blood Pressure  mmHg    Ventricular Rate 77 BPM    Atrial Rate 77 BPM    KY Interval 180 ms    QRS Duration 88 ms     ms    QTc 463 ms    P Axis 46 degrees    R AXIS 35 degrees    T Axis 36 degrees    Interpretation ECG       Sinus rhythm  Possible Left atrial enlargement  Borderline ECG  When compared with ECG of 07-AUG-2020 11:22,  No significant change was found  Confirmed by RAVEN DOWNS MD LOC:[facility] ([id number]) on 5/17/2022 3:50:33 PM      calcium score 0 2021  Stress test 2019 - normal         Revised Cardiac Risk Index (RCRI):  The patient has the following serious cardiovascular risks for perioperative complications:   - No serious cardiac risks = 0 points     RCRI Interpretation: 0 points: Class I (very low risk - 0.4% complication rate)           Signed Electronically by: Shauna Lin DO  Copy of this evaluation report is provided to requesting physician.

## 2022-05-17 NOTE — PROGRESS NOTES
[facility]  [address] [facility] [address]  Phone: [phone]  Fax: [phone]  Primary Provider: Maru Emerson  Pre-op Performing Provider: DREW COTE        PREOPERATIVE EVALUATION:  Today's date: 5/17/2022    Brenna Fournier is a 57 year old female who presents for a preoperative evaluation.    Surgical Information:  Surgery/Procedure:  Lumbar 2-Lumbar 3, Lumbar 3-Lumbar 4, Lumbar Decompression       Lumbar 2-5 cleaned up  Surgery Location: [facility]  Surgeon: Dr. Carter  Surgery Date: 05-  Time of Surgery: 10am  Where patient plans to recover: At home with family  Fax number for surgical facility: Note does not need to be faxed, will be available electronically in Epic.    Type of Anesthesia Anticipated: General    Assessment & Plan     The proposed surgical procedure is considered INTERMEDIATE risk.    Preop general physical exam  Patient is cleared to proceed with surgery for L2-L5 decompression surgery for spinal stenosis.  The following labs have been placed and are within normal limits.  She will return within 5 days prior for her COVID 19 screening.  No active symptoms or signs of infection.  EKG is within normal limits with no concerning cardiac history.  She actually had a full cardiac work-up recently and included stress testing and CT calcium score of 0 in the last year.  She is aware of the importance of tobacco cessation on her healing process and is hoping that she will be at that point after surgery.  She was given a limited supply of oxycodone to help with nighttime sleep for pain until after the surgery.  I reviewed  and she has not had excessive prescriptions for controlled substances.  I gave her a week supply to last her until the point of her surgery.  - Comprehensive metabolic panel (BMP + Alb, Alk Phos, ALT, AST, Total. Bili, TP); Future  - CBC with platelets; Future  - EKG 12-lead, tracing only  - INR;  Future  - Partial thromboplastin time; Future  - Comprehensive metabolic panel (BMP + Alb, Alk Phos, ALT, AST, Total. Bili, TP)  - CBC with platelets  - INR  - Partial thromboplastin time  - oxyCODONE-acetaminophen (PERCOCET) 5-325 MG tablet; Take 1 tablet by mouth 3 times daily as needed for severe pain    Spinal stenosis of lumbar region with neurogenic claudication  -As indicated above.  Surgery planned.  Pain control given for a 7-day course of only 21 tablets until surgery.  I also filled out a disability permit for 6 months.  She is aware of stool softeners that she should be taking prior  - oxyCODONE-acetaminophen (PERCOCET) 5-325 MG tablet; Take 1 tablet by mouth 3 times daily as needed for severe pain    Nontoxic Solitary Thyroid Nodule    - TSH with free T4 reflex    Tobacco dependence  -Patient was counseled on tobacco cessation and has plans for quitting in the near future and needs no prescription management per patient           Risks and Recommendations:  The patient has the following additional risks and recommendations for perioperative complications:   - No identified additional risk factors other than previously addressed    Medication Instructions:  Patient is on no chronic medications    RECOMMENDATION:  APPROVAL GIVEN to proceed with proposed procedure, without further diagnostic evaluation.    Review of external notes as documented above           40 minutes spent on the date of the encounter doing chart review, history and exam, documentation and further activities per the note         Subjective     HPI related to upcoming procedure:   Severe pain in low back since November due to spinal stenosis -radicular symptoms and weakness down leg.   Completely out of oxycodone- taking at night for sleep but just ran out. Reviewed   Lyrica, flexeril, muscle relaxers not helpful  Muscles were cramping so much  Hasnt started tobacco cessation./Very motivated to quit but has had a lot of stressors  with her health and her significant other's health in the last few months.    No previous issues with surgery.   Will be working from home.   Does not have disability parking permit.  Indicates it would be helpful.  Preop Questions 5/16/2022   1. Have you ever had a heart attack or stroke? No   2. Have you ever had surgery on your heart or blood vessels, such as a stent placement, a coronary artery bypass, or surgery on an artery in your head, neck, heart, or legs? No   3. Do you have chest pain with activity? No   4. Do you have a history of  heart failure? No   5. Do you currently have a cold, bronchitis or symptoms of other infection? No   6. Do you have a cough, shortness of breath, or wheezing? No   7. Do you or anyone in your family have previous history of blood clots? No   8. Do you or does anyone in your family have a serious bleeding problem such as prolonged bleeding following surgeries or cuts? No   9. Have you ever had problems with anemia or been told to take iron pills? No   10. Have you had any abnormal blood loss such as black, tarry or bloody stools, or abnormal vaginal bleeding? No   11. Have you ever had a blood transfusion? No   12. Are you willing to have a blood transfusion if it is medically needed before, during, or after your surgery? Yes   13. Have you or any of your relatives ever had problems with anesthesia? No   14. Do you have sleep apnea, excessive snoring or daytime drowsiness? No   15. Do you have any artifical heart valves or other implanted medical devices like a pacemaker, defibrillator, or continuous glucose monitor? No   16. Do you have artificial joints? YES - right knee    17. Are you allergic to latex? YES:  Redness and itching/blister   18. Is there any chance that you may be pregnant? No       Health Care Directive:  Patient does not have a Health Care Directive or Living Will: Advance Directive received and scanned. Click on Code in the patient header to  view.    Preoperative Review of :   reviewed - controlled substances reflected in medication list.       Review of Systems  Review of Systems  Complete ROS reviewed in HPI or otherwise negative      Patient Active Problem List    Diagnosis Date Noted     Spinal stenosis of lumbar region with neurogenic claudication 05/19/2022     Priority: Medium     Tobacco dependence 05/19/2022     Priority: Medium     Low calcium levels 07/06/2021     Priority: Medium     Hiatal hernia 07/06/2021     Priority: Medium     Irritable bowel syndrome with both constipation and diarrhea 10/10/2020     Priority: Medium     Benign neoplasm of ascending colon 07/27/2020     Priority: Medium     History of colonic polyps 07/23/2020     Priority: Medium     Thyroid Diffuse Enlargement      Priority: Medium     Nontoxic Solitary Thyroid Nodule      Priority: Medium     Joint Pain, Localized In The Knee      Priority: Medium     Obesity      Priority: Medium      Past Medical History:   Diagnosis Date     Cystic acne      Elevated blood pressure reading without diagnosis of hypertension 09/25/2019     Jaw pain      Localized pain of knee joint      Obesity      Past Surgical History:   Procedure Laterality Date     COLONOSCOPY  6/2020      KNEE SCOPE, DIAGNOSTIC      Description: Arthroscopy Knee Right;  Recorded: 11/21/2012;      SHLDR ARTHROSCOP,SURG,W/REMOVAL,LOOSE/FB      Description: Shoulder Arthroscopy;  Recorded: 11/21/2012;     JOINT REPLACEMENT       THYROIDECTOMY, PARTIAL Left 12/17/2019    Procedure: LEFT THYROID LOBECTOMY;  Surgeon: Santosh Doe MD;  Location: [facility];  Service: ENT     TRANSPLANT  2013    Knee Replacement     Current Outpatient Medications   Medication Sig Dispense Refill     Cyanocobalamin (B-12 PO)        MAGNESIUM PO        Multiple Vitamins-Minerals (MULTIVITAMIN ADULTS PO)        oxyCODONE-acetaminophen (PERCOCET) 5-325 MG tablet Take 1 tablet by mouth 3 times daily as needed for  "severe pain 21 tablet 0     POTASSIUM PO          Allergies   Allergen Reactions     Gadolinium-Containing Contrast Media [Gadolinium Derivatives] Anaphylaxis and Itching     Diatrizoate Meglumine [Diatrizoate] Itching     5 minutes post CT injection, patient noted her eyes and ears were extremely itchy     Esomeprazole Sodium [Esomeprazole] Unknown     Swelling     Gluten [Gluten Meal] Itching     Hydrocodone-Acetaminophen Unknown     Hyperactivity      Latex Unknown     Omeprazole Unknown     Swelling- ankles        Social History     Tobacco Use     Smoking status: Current Every Day Smoker     Packs/day: 0.50     Years: 25.00     Pack years: 12.50     Types: Cigarettes     Smokeless tobacco: Never Used     Tobacco comment: Have stopped off and on   Substance Use Topics     Alcohol use: Yes     Comment: Alcoholic Drinks/day: twice weekly     Family History   Problem Relation Age of Onset     Colon Cancer Father 78     Hypertension Father      Heart Disease Other      Cerebrovascular Disease Other      History   Drug Use Unknown         Objective     /89   Pulse 76   Temp 98.9  F (37.2  C) (Oral)   Resp 10   Ht 1.721 m (5' 7.75\")   Wt 107.9 kg (237 lb 12.8 oz)   LMP  (LMP Unknown)   SpO2 98%   BMI 36.42 kg/m      Physical Exam    GENERAL APPEARANCE: healthy, alert and no distress.  Moves around slowly with discomfort.     EYES: EOMI, PERRL     HENT: ear canals and TM's normal and nose and mouth without ulcers or lesions     NECK: no adenopathy, no asymmetry, masses, or scars and thyroid normal to palpation     RESP: lungs clear to auscultation - no rales, rhonchi or wheezes     CV: regular rates and rhythm, normal S1 S2, no S3 or S4 and no murmur, click or rub     ABDOMEN:  soft, nontender, no HSM or masses and bowel sounds normal     MS: extremities normal- no gross deformities noted, no evidence of inflammation in joints, FROM in all extremities.     SKIN: no suspicious lesions or rashes     NEURO: " Normal strength and tone, sensory exam grossly normal, mentation intact and speech normal     PSYCH: mentation appears normal. and affect normal/bright     LYMPHATICS: No cervical adenopathy    Recent Labs   Lab Test 10/19/21  1129 07/29/21  1654 06/08/21  0840   HGB 14.9  --  14.9     --  226   NA  --  140 141   POTASSIUM  --  4.1 4.1   CR  --  0.86 0.84        Diagnostics:  Recent Results (from the past 720 hour(s))   Comprehensive metabolic panel (BMP + Alb, Alk Phos, ALT, AST, Total. Bili, TP)    Collection Time: 05/17/22 11:05 AM   Result Value Ref Range    Sodium 141 136 - 145 mmol/L    Potassium 4.5 3.5 - 5.0 mmol/L    Chloride 105 98 - 107 mmol/L    Carbon Dioxide (CO2) 26 22 - 31 mmol/L    Anion Gap 10 5 - 18 mmol/L    Urea Nitrogen 22 8 - 22 mg/dL    Creatinine 0.84 0.60 - 1.10 mg/dL    Calcium 8.9 8.5 - 10.5 mg/dL    Glucose 89 70 - 125 mg/dL    Alkaline Phosphatase 94 45 - 120 U/L    AST 26 0 - 40 U/L    ALT 27 0 - 45 U/L    Protein Total 6.8 6.0 - 8.0 g/dL    Albumin 4.0 3.5 - 5.0 g/dL    Bilirubin Total 0.7 0.0 - 1.0 mg/dL    GFR Estimate 80 >60 mL/min/1.73m2   CBC with platelets    Collection Time: 05/17/22 11:05 AM   Result Value Ref Range    WBC Count 7.0 4.0 - 11.0 10e3/uL    RBC Count 5.15 3.80 - 5.20 10e6/uL    Hemoglobin 16.1 (H) 11.7 - 15.7 g/dL    Hematocrit 48.0 (H) 35.0 - 47.0 %    MCV 93 78 - 100 fL    MCH 31.3 26.5 - 33.0 pg    MCHC 33.5 31.5 - 36.5 g/dL    RDW 12.1 10.0 - 15.0 %    Platelet Count 237 150 - 450 10e3/uL   INR    Collection Time: 05/17/22 11:05 AM   Result Value Ref Range    INR 1.03 0.85 - 1.15   Partial thromboplastin time    Collection Time: 05/17/22 11:05 AM   Result Value Ref Range    aPTT 31 22 - 38 Seconds   TSH with free T4 reflex    Collection Time: 05/17/22 11:05 AM   Result Value Ref Range    TSH 3.63 0.30 - 5.00 uIU/mL   EKG 12-lead, tracing only    Collection Time: 05/17/22 11:30 AM   Result Value Ref Range    Systolic Blood Pressure  mmHg    Diastolic  Blood Pressure  mmHg    Ventricular Rate 77 BPM    Atrial Rate 77 BPM    DE Interval 180 ms    QRS Duration 88 ms     ms    QTc 463 ms    P Axis 46 degrees    R AXIS 35 degrees    T Axis 36 degrees    Interpretation ECG       Sinus rhythm  Possible Left atrial enlargement  Borderline ECG  When compared with ECG of 07-AUG-2020 11:22,  No significant change was found  Confirmed by RAVEN DOWNS MD LOC:[facility]N ([id number]) on 5/17/2022 3:50:33 PM      calcium score 0 2021  Stress test 2019 - normal         Revised Cardiac Risk Index (RCRI):  The patient has the following serious cardiovascular risks for perioperative complications:   - No serious cardiac risks = 0 points     RCRI Interpretation: 0 points: Class I (very low risk - 0.4% complication rate)           Signed Electronically by: Shauna Lin DO  Copy of this evaluation report is provided to requesting physician.

## 2022-05-19 PROBLEM — K76.0 HEPATIC STEATOSIS: Status: RESOLVED | Noted: 2021-07-06 | Resolved: 2022-05-19

## 2022-05-19 PROBLEM — R06.09 DYSPNEA ON EXERTION: Status: RESOLVED | Noted: 2021-06-08 | Resolved: 2022-05-19

## 2022-05-19 PROBLEM — R61 DIAPHORESIS: Status: RESOLVED | Noted: 2021-06-08 | Resolved: 2022-05-19

## 2022-05-19 PROBLEM — K57.30 DIVERTICULAR DISEASE OF LARGE INTESTINE: Status: RESOLVED | Noted: 2020-07-23 | Resolved: 2022-05-19

## 2022-05-19 PROBLEM — M48.062 SPINAL STENOSIS OF LUMBAR REGION WITH NEUROGENIC CLAUDICATION: Status: ACTIVE | Noted: 2022-05-19

## 2022-05-19 PROBLEM — F17.200 TOBACCO DEPENDENCE: Status: ACTIVE | Noted: 2022-05-19

## 2022-05-19 PROBLEM — R07.2 PRECORDIAL PAIN: Status: RESOLVED | Noted: 2021-06-08 | Resolved: 2022-05-19

## 2022-05-20 ENCOUNTER — LAB (OUTPATIENT)
Dept: LAB | Facility: CLINIC | Age: 57
End: 2022-05-20
Attending: ORTHOPAEDIC SURGERY
Payer: COMMERCIAL

## 2022-05-20 DIAGNOSIS — Z11.59 ENCOUNTER FOR SCREENING FOR OTHER VIRAL DISEASES: ICD-10-CM

## 2022-05-20 PROCEDURE — U0003 INFECTIOUS AGENT DETECTION BY NUCLEIC ACID (DNA OR RNA); SEVERE ACUTE RESPIRATORY SYNDROME CORONAVIRUS 2 (SARS-COV-2) (CORONAVIRUS DISEASE [COVID-19]), AMPLIFIED PROBE TECHNIQUE, MAKING USE OF HIGH THROUGHPUT TECHNOLOGIES AS DESCRIBED BY CMS-2020-01-R: HCPCS

## 2022-05-20 PROCEDURE — U0005 INFEC AGEN DETEC AMPLI PROBE: HCPCS

## 2022-05-21 LAB — SARS-COV-2 RNA RESP QL NAA+PROBE: NEGATIVE

## 2022-05-23 NOTE — PROGRESS NOTES
Assessment    1. Neurogenic claudication with bilateral lumbar radiculopathy  2. Severe stenosis L2-L3 and L3-4  3. Ankylosed L5-S1 segment  4. Degenerative disc disease L4-L5  5. Worsening symptoms, causing currently severe disability  6. Patient unable to stand or walk for more than couple of minutes or even quarter of a block  7. Patient has failed aggressive nonsurgical treatment for the last 5 months  A. Symptoms severely affecting daily activities and quality of life    We discussed findings. We discussed treatment options. We discussed continuing nonsurgical treatment options. Unfortunately patient has failed this. She would like to know if there is other treatment option for her. We discussed surgery. We discussed a decompression surgery at L2-3 and L3-4. We discussed findings of bilateral facets at L3-4 and there orientation in the sagittal plane. We discussed that a decompression surgery could cause iatrogenic instability. At the same time there is no significant evidence of anterolisthesis on flexion-extension x-rays. I do feel a two-level decompression is a reasonable treatment option for her. We discussed the benefits and risks of comparing this surgery to a fusion surgery. She understood. We then discussed the preoperative, intraoperative, postoperative, rehabilitation, recovery and expectation details of the above-mentioned surgery. Patient understood. We then discussed risks of surgery which includes but are not limited to:    Anesthesia complications including death, stroke, heart attack, pulmonary embolism, respiratory depression and deep vein thrombosis. Approach complications including infection, dural tears, which could need and end up with a primary or secondary repair needing added length of stay, laying flat for multiple days, subsequent surgeries for repair and/or the need of a lumbar drain, neurological damage including loss of motor strength persistent pain and/or abnormal sensation over  one or several dermatomal distributions, expanding hematoma requiring subsequent surgery for evacuation, vision loss, temorary or permanent peripheral neuropathy due to positioning and iatrogenic instability following decompression.    Patient understood the stated diagnosis and the recommended treatment option including accepting the risks associated with it. Patient would like to proceed with surgical management, all questions were answered.     The patient does not have any untreated underlying mental health or substance abuse disorders that we will impair successful recovery after surgical treatment.      Plan     Renew: Cyclobenzaprine HCl - 10 MG Oral Tablet; TAKE 1 TABLET 3 TIMES DAILY AS  NEEDED     Renew: oxyCODONE-Acetaminophen 5-325 MG Oral Tablet; TAKE 1 TABLET EVERY 6  TO 8 HOURS AS NEEDED FOR PAIN     Renew: Pregabalin 75 MG Oral Capsule; TAKE 1 CAPSULE 3 TIMES DAILY    1. L2-L4 lumbar decompression. 30 ___ Minutes were spent on today's date. The time encompasses all chart review, history and exam, documentation, and coordination of care for the patient.        Chief Complaint    The patient presents to the office today with Severe low back pain and bilateral lower extremity radiculopathy.      Subjective    57-year-old female seen today due to worsening low back pain bilateral lower extremity radiculopathy. Patient seen as a surgical consult. Patient has been treated with nonsurgical treatment so far including physical therapy, neuromodulators, muscle relaxants, oral steroids and currently on pain medications to treat her severe intractable pain. Patient states that this is mainly lower extremity radiating pain buttocks back of her thighs sometimes all the way down to her feet this is mainly when standing or walking for distance gets better when she is seated or leaning forward. She also have some back pain but this is minimal. Sometimes she feels the pain radiating towards either side of her  thigh.She has tried nerve root injections without significant therapeutic benefit although it did provide good diagnostic relief this was bilateral L4 nerve root injections. Patient denies any progressive weakness of the lower extremities, saddle anesthesia or loss of bowel or bladder control. Patient feels that this is an 8 out of 10 pain are more when she is standing or walking. She Yudith feels quite disabled by her symptoms. She will like to know if there is a surgical option for her.      Objective  This is a well-nourished patient and is in no acute distress. While seated in a wheelchair  Patient is alert and oriented to time person and place and is not depressed or agitated.   Skin has a normal temperature on palpation without evidence for swelling or ecchymosis.   Patient has unlabored respirations without the use of accessory muscles. There are no notable skin lesions.   There is no obvious deformity of the thoracolumbar spine.  Patient is in significant pain when standing   Pain is worse with extension of the lumbar spine improves with flexion  There are no obvious long track signs.   Motor examination:  Left:  Iliopsoas 5/5  Quadriceps 5/5  Hip adduction 5/5  Hip abduction 5/5  TA 5/5  EHL 5/5  Gastrosoleus 5/5    Right:   Iliopsoas 5/5  Quadriceps 5/5  Hip adduction 5/5  Hip abduction 5/5  TA 5/5  EHL 5/5  Gastrosoleus 5/5    Sensation is disturbed over right more than left L3 and L4 dermatomal distribution over light touch examination    DTR:  Right:  Patella normal  Achilles normal  Left:  Patella normal  Achilles normal    Pulses distally were palpated and felt 2+ bilaterally at dorsalis pedis and posterior tibialis. No abnormal skin changes over distal extremities.    Seated straight leg test was negative bilaterally     Antalgic gait, patient walks in a forward flexed position.      Imaging and Test Results    Lumbar x-rays available at [facility] shows severe degenerative disc disease at L5-S1.  There is multilevel lumbar spondylosis with subtle retrolisthesis at L2-L3. No significant instability noted on flexion-extension films. Bilateral hips shows no signs of severe degenerative joint disease. No fracture lesions are noted.     Lumbar MRI dated June 25 2022 shows a congenitally narrowed spinal canal.. Findings as follows:  1. At L2-L3 there is bilateral facet arthropathy hypertrophy of the ligamentum flavum and a central broad-based disc protrusion that is subtle in nature causing severe central stenosis  2. At L2-3 L4 there is severe bilateral facet arthropathy with severe hypertrophy of the ligamentum flavum central broad-based disc protrusion causing severe central stenosis. There is bilateral facet inflammatory changes noted with fluid inside bilateral facets this measures around 2 mm. There is sagittally oriented facets noted at L3-4.  3. At L4-L5 there is endplate changes noted on T1 and T2 weighted images. There is mild to moderate medial foraminal stenosis this is worse towards the right when compared to the left.  4. At L5-S1 there is severe degeneration of the intervertebral disc with possible ankylosis of the segment no areas of high-grade stenosis noted at this segment.      Active Problems   1. Ankle joint pain   2. Denied: Anticoagulant prescribed   3. Denied: History of Anticoagulant prescribed   4. Current smoker   5. Derangement of posterior horn of medial meniscus   6. Foot pain, bilateral   7. Foot pain, right   8. Gait disturbance   9. Hip pain   10. Knee pain   11. Knee pain, unspecified laterality   12. Knee stiffness   13. Low back pain with sciatica   14. Muscle weakness   15. Non-smoker   16. Old disruption of anterior cruciate ligament   17. Orthopedic aftercare for joint replacement   18. Osteoarthritis of knee, unspecified   19. Overweight   20. Pain in left tibia   21. Shin splint   22. Stress fracture of tibia    Past Medical History     History of fracture     History of No  significant past medical history    Surgical History     History of Arthroscopy Knee     History of Jaw Surgery     History of Knee replacement     History of Shoulder Arthroscopy     History of Shoulder arthroscopy     History of Thyroidectomy    Family History     Family history of hypertension     Family history of hypertension     Family history of hypertension     Family history of hypertension     Family history of hypertension    Social History     Alcohol drinker     Denied: Chewing tobacco use     Cigarette smoker     Current every day smoker     Current smoker     Currently working     Former smoker     Non-smoker     Working regular duty    Current Meds   1. Cyclobenzaprine HCl - 10 MG Oral Tablet; TAKE 1 TABLET 3 TIMES DAILY AS NEEDED;   Therapy: [id number] to (Evaluate:[id number])  Requested for: [id number]; Last   Rx:[id number] Ordered   2. oxyCODONE-Acetaminophen 5-325 MG Oral Tablet; TAKE 1 TABLET EVERY 6 TO 8   HOURS AS NEEDED FOR PAIN;   Therapy: [id number] to (Evaluate:[id number])  Requested for: [id number]; Last   Rx:[id number] Ordered   3. Pregabalin 150 MG Oral Capsule; TAKE 1 CAPSULE BY MOUTH TWICE DAILY.   GENERIC;   Therapy: [id number] to (Last Rx:[id number])  Requested for: [id number] Ordered   4. Pregabalin 75 MG Oral Capsule;   Therapy: [id number] to Recorded    Allergies   1. Adhesive Tape TAPE   2. Latex Gloves MISC   3. Vicodin TABS   4. Adhesive Tape   5. Contrast Dye   6. Gluten   7. Latex    Signatures  Electronically signed by : Roel Olguin M.D.; [id number]; May  4 2022  4:46PM CST (Author)

## 2022-05-24 ENCOUNTER — HOSPITAL ENCOUNTER (OUTPATIENT)
Facility: CLINIC | Age: 57
Setting detail: OBSERVATION
Discharge: HOME | End: 2022-05-25
Attending: ORTHOPAEDIC SURGERY | Admitting: ORTHOPAEDIC SURGERY
Payer: COMMERCIAL

## 2022-05-24 ENCOUNTER — ANESTHESIA EVENT (OUTPATIENT)
Dept: SURGERY | Facility: CLINIC | Age: 57
End: 2022-05-24
Payer: COMMERCIAL

## 2022-05-24 ENCOUNTER — APPOINTMENT (OUTPATIENT)
Dept: GENERAL RADIOLOGY | Facility: CLINIC | Age: 57
End: 2022-05-24
Attending: ORTHOPAEDIC SURGERY
Payer: COMMERCIAL

## 2022-05-24 ENCOUNTER — ANESTHESIA (OUTPATIENT)
Dept: SURGERY | Facility: CLINIC | Age: 57
End: 2022-05-24
Payer: COMMERCIAL

## 2022-05-24 DIAGNOSIS — M48.062 SPINAL STENOSIS OF LUMBAR REGION WITH NEUROGENIC CLAUDICATION: Primary | ICD-10-CM

## 2022-05-24 PROCEDURE — 250N000009 HC RX 250: Performed by: NURSE ANESTHETIST, CERTIFIED REGISTERED

## 2022-05-24 PROCEDURE — 250N000013 HC RX MED GY IP 250 OP 250 PS 637: Performed by: ORTHOPAEDIC SURGERY

## 2022-05-24 PROCEDURE — 250N000009 HC RX 250: Performed by: ORTHOPAEDIC SURGERY

## 2022-05-24 PROCEDURE — 999N000141 HC STATISTIC PRE-PROCEDURE NURSING ASSESSMENT: Performed by: ORTHOPAEDIC SURGERY

## 2022-05-24 PROCEDURE — 120N000001 HC R&B MED SURG/OB

## 2022-05-24 PROCEDURE — G0378 HOSPITAL OBSERVATION PER HR: HCPCS

## 2022-05-24 PROCEDURE — 360N000084 HC SURGERY LEVEL 4 W/ FLUORO, PER MIN: Performed by: ORTHOPAEDIC SURGERY

## 2022-05-24 PROCEDURE — 250N000011 HC RX IP 250 OP 636: Performed by: ORTHOPAEDIC SURGERY

## 2022-05-24 PROCEDURE — 272N000004 HC RX 272: Performed by: ORTHOPAEDIC SURGERY

## 2022-05-24 PROCEDURE — 250N000005 HC OR RX SURGIFLO HEMOSTATIC MATRIX 10ML 199102S OPNP: Performed by: ORTHOPAEDIC SURGERY

## 2022-05-24 PROCEDURE — 250N000011 HC RX IP 250 OP 636: Performed by: NURSE ANESTHETIST, CERTIFIED REGISTERED

## 2022-05-24 PROCEDURE — 258N000003 HC RX IP 258 OP 636: Performed by: NURSE ANESTHETIST, CERTIFIED REGISTERED

## 2022-05-24 PROCEDURE — 272N000001 HC OR GENERAL SUPPLY STERILE: Performed by: ORTHOPAEDIC SURGERY

## 2022-05-24 PROCEDURE — 370N000017 HC ANESTHESIA TECHNICAL FEE, PER MIN: Performed by: ORTHOPAEDIC SURGERY

## 2022-05-24 PROCEDURE — 258N000003 HC RX IP 258 OP 636: Performed by: ORTHOPAEDIC SURGERY

## 2022-05-24 PROCEDURE — 250N000013 HC RX MED GY IP 250 OP 250 PS 637: Performed by: ANESTHESIOLOGY

## 2022-05-24 PROCEDURE — 999N000179 XR SURGERY CARM FLUORO LESS THAN 5 MIN W STILLS

## 2022-05-24 PROCEDURE — 710N000009 HC RECOVERY PHASE 1, LEVEL 1, PER MIN: Performed by: ORTHOPAEDIC SURGERY

## 2022-05-24 PROCEDURE — 258N000003 HC RX IP 258 OP 636: Performed by: ANESTHESIOLOGY

## 2022-05-24 RX ORDER — ONDANSETRON 2 MG/ML
INJECTION INTRAMUSCULAR; INTRAVENOUS PRN
Status: DISCONTINUED | OUTPATIENT
Start: 2022-05-24 | End: 2022-05-24

## 2022-05-24 RX ORDER — NALOXONE HYDROCHLORIDE 0.4 MG/ML
0.4 INJECTION, SOLUTION INTRAMUSCULAR; INTRAVENOUS; SUBCUTANEOUS
Status: DISCONTINUED | OUTPATIENT
Start: 2022-05-24 | End: 2022-05-25 | Stop reason: HOSPADM

## 2022-05-24 RX ORDER — CEFAZOLIN SODIUM/WATER 2 G/20 ML
2 SYRINGE (ML) INTRAVENOUS
Status: COMPLETED | OUTPATIENT
Start: 2022-05-24 | End: 2022-05-24

## 2022-05-24 RX ORDER — SODIUM CHLORIDE, SODIUM LACTATE, POTASSIUM CHLORIDE, CALCIUM CHLORIDE 600; 310; 30; 20 MG/100ML; MG/100ML; MG/100ML; MG/100ML
INJECTION, SOLUTION INTRAVENOUS CONTINUOUS
Status: DISCONTINUED | OUTPATIENT
Start: 2022-05-24 | End: 2022-05-24 | Stop reason: HOSPADM

## 2022-05-24 RX ORDER — OXYCODONE HYDROCHLORIDE 5 MG/1
5 TABLET ORAL EVERY 4 HOURS PRN
Status: DISCONTINUED | OUTPATIENT
Start: 2022-05-24 | End: 2022-05-24 | Stop reason: HOSPADM

## 2022-05-24 RX ORDER — FENTANYL CITRATE 50 UG/ML
25 INJECTION, SOLUTION INTRAMUSCULAR; INTRAVENOUS EVERY 5 MIN PRN
Status: DISCONTINUED | OUTPATIENT
Start: 2022-05-24 | End: 2022-05-24 | Stop reason: HOSPADM

## 2022-05-24 RX ORDER — AMOXICILLIN 250 MG
1 CAPSULE ORAL 2 TIMES DAILY
Status: DISCONTINUED | OUTPATIENT
Start: 2022-05-24 | End: 2022-05-25 | Stop reason: HOSPADM

## 2022-05-24 RX ORDER — GLYCOPYRROLATE 0.2 MG/ML
INJECTION, SOLUTION INTRAMUSCULAR; INTRAVENOUS PRN
Status: DISCONTINUED | OUTPATIENT
Start: 2022-05-24 | End: 2022-05-24

## 2022-05-24 RX ORDER — NALOXONE HYDROCHLORIDE 0.4 MG/ML
0.2 INJECTION, SOLUTION INTRAMUSCULAR; INTRAVENOUS; SUBCUTANEOUS
Status: DISCONTINUED | OUTPATIENT
Start: 2022-05-24 | End: 2022-05-25 | Stop reason: HOSPADM

## 2022-05-24 RX ORDER — ACETAMINOPHEN 325 MG/1
975 TABLET ORAL EVERY 8 HOURS
Status: DISCONTINUED | OUTPATIENT
Start: 2022-05-24 | End: 2022-05-25 | Stop reason: HOSPADM

## 2022-05-24 RX ORDER — HYDROMORPHONE HCL IN WATER/PF 6 MG/30 ML
0.4 PATIENT CONTROLLED ANALGESIA SYRINGE INTRAVENOUS
Status: DISCONTINUED | OUTPATIENT
Start: 2022-05-24 | End: 2022-05-25 | Stop reason: HOSPADM

## 2022-05-24 RX ORDER — OXYCODONE HYDROCHLORIDE 5 MG/1
10 TABLET ORAL EVERY 4 HOURS PRN
Status: DISCONTINUED | OUTPATIENT
Start: 2022-05-24 | End: 2022-05-25 | Stop reason: HOSPADM

## 2022-05-24 RX ORDER — METHOCARBAMOL 750 MG/1
750 TABLET, FILM COATED ORAL EVERY 6 HOURS PRN
Status: DISCONTINUED | OUTPATIENT
Start: 2022-05-24 | End: 2022-05-25 | Stop reason: HOSPADM

## 2022-05-24 RX ORDER — SCOLOPAMINE TRANSDERMAL SYSTEM 1 MG/1
1 PATCH, EXTENDED RELEASE TRANSDERMAL
Status: DISCONTINUED | OUTPATIENT
Start: 2022-05-24 | End: 2022-05-25 | Stop reason: HOSPADM

## 2022-05-24 RX ORDER — FENTANYL CITRATE 50 UG/ML
INJECTION, SOLUTION INTRAMUSCULAR; INTRAVENOUS PRN
Status: DISCONTINUED | OUTPATIENT
Start: 2022-05-24 | End: 2022-05-24

## 2022-05-24 RX ORDER — ONDANSETRON 4 MG/1
4 TABLET, ORALLY DISINTEGRATING ORAL EVERY 6 HOURS PRN
Status: DISCONTINUED | OUTPATIENT
Start: 2022-05-24 | End: 2022-05-25 | Stop reason: HOSPADM

## 2022-05-24 RX ORDER — DEXAMETHASONE SODIUM PHOSPHATE 4 MG/ML
INJECTION, SOLUTION INTRA-ARTICULAR; INTRALESIONAL; INTRAMUSCULAR; INTRAVENOUS; SOFT TISSUE PRN
Status: DISCONTINUED | OUTPATIENT
Start: 2022-05-24 | End: 2022-05-24

## 2022-05-24 RX ORDER — GABAPENTIN 100 MG/1
100 CAPSULE ORAL 3 TIMES DAILY
Status: DISCONTINUED | OUTPATIENT
Start: 2022-05-24 | End: 2022-05-25 | Stop reason: HOSPADM

## 2022-05-24 RX ORDER — BISACODYL 10 MG
10 SUPPOSITORY, RECTAL RECTAL DAILY PRN
Status: DISCONTINUED | OUTPATIENT
Start: 2022-05-24 | End: 2022-05-25 | Stop reason: HOSPADM

## 2022-05-24 RX ORDER — MEPERIDINE HYDROCHLORIDE 25 MG/ML
12.5 INJECTION INTRAMUSCULAR; INTRAVENOUS; SUBCUTANEOUS
Status: DISCONTINUED | OUTPATIENT
Start: 2022-05-24 | End: 2022-05-24 | Stop reason: HOSPADM

## 2022-05-24 RX ORDER — ACETAMINOPHEN 325 MG/1
650 TABLET ORAL EVERY 4 HOURS PRN
Status: DISCONTINUED | OUTPATIENT
Start: 2022-05-27 | End: 2022-05-25 | Stop reason: HOSPADM

## 2022-05-24 RX ORDER — PROCHLORPERAZINE MALEATE 5 MG
10 TABLET ORAL EVERY 6 HOURS PRN
Status: DISCONTINUED | OUTPATIENT
Start: 2022-05-24 | End: 2022-05-25 | Stop reason: HOSPADM

## 2022-05-24 RX ORDER — SODIUM CHLORIDE, SODIUM LACTATE, POTASSIUM CHLORIDE, CALCIUM CHLORIDE 600; 310; 30; 20 MG/100ML; MG/100ML; MG/100ML; MG/100ML
INJECTION, SOLUTION INTRAVENOUS CONTINUOUS PRN
Status: DISCONTINUED | OUTPATIENT
Start: 2022-05-24 | End: 2022-05-24

## 2022-05-24 RX ORDER — BUPIVACAINE HYDROCHLORIDE AND EPINEPHRINE 2.5; 5 MG/ML; UG/ML
INJECTION, SOLUTION INFILTRATION; PERINEURAL PRN
Status: DISCONTINUED | OUTPATIENT
Start: 2022-05-24 | End: 2022-05-24 | Stop reason: HOSPADM

## 2022-05-24 RX ORDER — LIDOCAINE 40 MG/G
CREAM TOPICAL
Status: DISCONTINUED | OUTPATIENT
Start: 2022-05-24 | End: 2022-05-25 | Stop reason: HOSPADM

## 2022-05-24 RX ORDER — GABAPENTIN 100 MG/1
300 CAPSULE ORAL
Status: COMPLETED | OUTPATIENT
Start: 2022-05-24 | End: 2022-05-24

## 2022-05-24 RX ORDER — ONDANSETRON 2 MG/ML
4 INJECTION INTRAMUSCULAR; INTRAVENOUS EVERY 30 MIN PRN
Status: DISCONTINUED | OUTPATIENT
Start: 2022-05-24 | End: 2022-05-24 | Stop reason: HOSPADM

## 2022-05-24 RX ORDER — PROPOFOL 10 MG/ML
INJECTION, EMULSION INTRAVENOUS PRN
Status: DISCONTINUED | OUTPATIENT
Start: 2022-05-24 | End: 2022-05-24

## 2022-05-24 RX ORDER — HYDROMORPHONE HCL IN WATER/PF 6 MG/30 ML
0.2 PATIENT CONTROLLED ANALGESIA SYRINGE INTRAVENOUS EVERY 5 MIN PRN
Status: DISCONTINUED | OUTPATIENT
Start: 2022-05-24 | End: 2022-05-24 | Stop reason: HOSPADM

## 2022-05-24 RX ORDER — MAGNESIUM HYDROXIDE 1200 MG/15ML
LIQUID ORAL PRN
Status: DISCONTINUED | OUTPATIENT
Start: 2022-05-24 | End: 2022-05-24 | Stop reason: HOSPADM

## 2022-05-24 RX ORDER — METOPROLOL TARTRATE 1 MG/ML
1-2 INJECTION, SOLUTION INTRAVENOUS EVERY 5 MIN PRN
Status: DISCONTINUED | OUTPATIENT
Start: 2022-05-24 | End: 2022-05-24 | Stop reason: HOSPADM

## 2022-05-24 RX ORDER — VANCOMYCIN HYDROCHLORIDE 1 G/20ML
INJECTION, POWDER, LYOPHILIZED, FOR SOLUTION INTRAVENOUS PRN
Status: DISCONTINUED | OUTPATIENT
Start: 2022-05-24 | End: 2022-05-24 | Stop reason: HOSPADM

## 2022-05-24 RX ORDER — LIDOCAINE HYDROCHLORIDE 10 MG/ML
INJECTION, SOLUTION INFILTRATION; PERINEURAL PRN
Status: DISCONTINUED | OUTPATIENT
Start: 2022-05-24 | End: 2022-05-24

## 2022-05-24 RX ORDER — FENTANYL CITRATE 50 UG/ML
25 INJECTION, SOLUTION INTRAMUSCULAR; INTRAVENOUS
Status: DISCONTINUED | OUTPATIENT
Start: 2022-05-24 | End: 2022-05-24 | Stop reason: HOSPADM

## 2022-05-24 RX ORDER — HYDROMORPHONE HCL IN WATER/PF 6 MG/30 ML
0.2 PATIENT CONTROLLED ANALGESIA SYRINGE INTRAVENOUS
Status: DISCONTINUED | OUTPATIENT
Start: 2022-05-24 | End: 2022-05-25 | Stop reason: HOSPADM

## 2022-05-24 RX ORDER — CEFAZOLIN SODIUM 2 G/100ML
2 INJECTION, SOLUTION INTRAVENOUS EVERY 8 HOURS
Status: COMPLETED | OUTPATIENT
Start: 2022-05-24 | End: 2022-05-25

## 2022-05-24 RX ORDER — NEOSTIGMINE METHYLSULFATE 1 MG/ML
VIAL (ML) INJECTION PRN
Status: DISCONTINUED | OUTPATIENT
Start: 2022-05-24 | End: 2022-05-24

## 2022-05-24 RX ORDER — ONDANSETRON 2 MG/ML
4 INJECTION INTRAMUSCULAR; INTRAVENOUS EVERY 6 HOURS PRN
Status: DISCONTINUED | OUTPATIENT
Start: 2022-05-24 | End: 2022-05-25 | Stop reason: HOSPADM

## 2022-05-24 RX ORDER — SODIUM CHLORIDE 9 MG/ML
INJECTION, SOLUTION INTRAVENOUS CONTINUOUS
Status: DISCONTINUED | OUTPATIENT
Start: 2022-05-24 | End: 2022-05-25 | Stop reason: HOSPADM

## 2022-05-24 RX ORDER — LIDOCAINE 40 MG/G
CREAM TOPICAL
Status: DISCONTINUED | OUTPATIENT
Start: 2022-05-24 | End: 2022-05-24 | Stop reason: HOSPADM

## 2022-05-24 RX ORDER — CEFAZOLIN SODIUM/WATER 2 G/20 ML
2 SYRINGE (ML) INTRAVENOUS SEE ADMIN INSTRUCTIONS
Status: DISCONTINUED | OUTPATIENT
Start: 2022-05-24 | End: 2022-05-24 | Stop reason: HOSPADM

## 2022-05-24 RX ORDER — LABETALOL HYDROCHLORIDE 5 MG/ML
INJECTION, SOLUTION INTRAVENOUS PRN
Status: DISCONTINUED | OUTPATIENT
Start: 2022-05-24 | End: 2022-05-24

## 2022-05-24 RX ORDER — ONDANSETRON 4 MG/1
4 TABLET, ORALLY DISINTEGRATING ORAL EVERY 30 MIN PRN
Status: DISCONTINUED | OUTPATIENT
Start: 2022-05-24 | End: 2022-05-24 | Stop reason: HOSPADM

## 2022-05-24 RX ORDER — POLYETHYLENE GLYCOL 3350 17 G/17G
17 POWDER, FOR SOLUTION ORAL DAILY
Status: DISCONTINUED | OUTPATIENT
Start: 2022-05-25 | End: 2022-05-25 | Stop reason: HOSPADM

## 2022-05-24 RX ORDER — OXYCODONE HYDROCHLORIDE 5 MG/1
5 TABLET ORAL EVERY 4 HOURS PRN
Status: DISCONTINUED | OUTPATIENT
Start: 2022-05-24 | End: 2022-05-25 | Stop reason: HOSPADM

## 2022-05-24 RX ORDER — DIPHENHYDRAMINE HCL 25 MG
25 CAPSULE ORAL EVERY 6 HOURS PRN
Status: DISCONTINUED | OUTPATIENT
Start: 2022-05-24 | End: 2022-05-25 | Stop reason: HOSPADM

## 2022-05-24 RX ORDER — DIPHENHYDRAMINE HYDROCHLORIDE 50 MG/ML
25 INJECTION INTRAMUSCULAR; INTRAVENOUS EVERY 6 HOURS PRN
Status: DISCONTINUED | OUTPATIENT
Start: 2022-05-24 | End: 2022-05-25 | Stop reason: HOSPADM

## 2022-05-24 RX ORDER — PHENYLEPHRINE HYDROCHLORIDE 10 MG/ML
INJECTION INTRAVENOUS PRN
Status: DISCONTINUED | OUTPATIENT
Start: 2022-05-24 | End: 2022-05-24

## 2022-05-24 RX ADMIN — HYDROMORPHONE HYDROCHLORIDE 0.5 MG: 1 INJECTION, SOLUTION INTRAMUSCULAR; INTRAVENOUS; SUBCUTANEOUS at 12:22

## 2022-05-24 RX ADMIN — CEFAZOLIN SODIUM 2 G: 2 INJECTION, SOLUTION INTRAVENOUS at 18:46

## 2022-05-24 RX ADMIN — HYDROMORPHONE HYDROCHLORIDE 0.25 MG: 1 INJECTION, SOLUTION INTRAMUSCULAR; INTRAVENOUS; SUBCUTANEOUS at 13:19

## 2022-05-24 RX ADMIN — NEOSTIGMINE METHYLSULFATE 1 MG: 1 INJECTION, SOLUTION INTRAVENOUS at 14:29

## 2022-05-24 RX ADMIN — SENNOSIDES AND DOCUSATE SODIUM 1 TABLET: 50; 8.6 TABLET ORAL at 19:37

## 2022-05-24 RX ADMIN — DEXAMETHASONE SODIUM PHOSPHATE 8 MG: 4 INJECTION, SOLUTION INTRA-ARTICULAR; INTRALESIONAL; INTRAMUSCULAR; INTRAVENOUS; SOFT TISSUE at 11:53

## 2022-05-24 RX ADMIN — SODIUM CHLORIDE, POTASSIUM CHLORIDE, SODIUM LACTATE AND CALCIUM CHLORIDE: 600; 310; 30; 20 INJECTION, SOLUTION INTRAVENOUS at 14:32

## 2022-05-24 RX ADMIN — OXYCODONE HYDROCHLORIDE 5 MG: 5 TABLET ORAL at 16:30

## 2022-05-24 RX ADMIN — FENTANYL CITRATE 250 MCG: 50 INJECTION, SOLUTION INTRAMUSCULAR; INTRAVENOUS at 11:53

## 2022-05-24 RX ADMIN — PROPOFOL 50 MCG/KG/MIN: 10 INJECTION, EMULSION INTRAVENOUS at 12:12

## 2022-05-24 RX ADMIN — OXYCODONE HYDROCHLORIDE 5 MG: 5 TABLET ORAL at 15:44

## 2022-05-24 RX ADMIN — HYDROMORPHONE HYDROCHLORIDE 0.25 MG: 1 INJECTION, SOLUTION INTRAMUSCULAR; INTRAVENOUS; SUBCUTANEOUS at 13:25

## 2022-05-24 RX ADMIN — ROCURONIUM BROMIDE 30 MG: 50 INJECTION, SOLUTION INTRAVENOUS at 12:57

## 2022-05-24 RX ADMIN — PROPOFOL 200 MG: 10 INJECTION, EMULSION INTRAVENOUS at 11:53

## 2022-05-24 RX ADMIN — PHENYLEPHRINE HYDROCHLORIDE 100 MCG: 10 INJECTION INTRAVENOUS at 12:48

## 2022-05-24 RX ADMIN — GABAPENTIN 300 MG: 100 CAPSULE ORAL at 10:09

## 2022-05-24 RX ADMIN — HYDROMORPHONE HYDROCHLORIDE 0.5 MG: 1 INJECTION, SOLUTION INTRAMUSCULAR; INTRAVENOUS; SUBCUTANEOUS at 12:24

## 2022-05-24 RX ADMIN — ROCURONIUM BROMIDE 20 MG: 50 INJECTION, SOLUTION INTRAVENOUS at 12:28

## 2022-05-24 RX ADMIN — OXYCODONE HYDROCHLORIDE 10 MG: 5 TABLET ORAL at 21:06

## 2022-05-24 RX ADMIN — ROCURONIUM BROMIDE 40 MG: 50 INJECTION, SOLUTION INTRAVENOUS at 11:53

## 2022-05-24 RX ADMIN — METHOCARBAMOL 750 MG: 750 TABLET ORAL at 22:03

## 2022-05-24 RX ADMIN — ROCURONIUM BROMIDE 10 MG: 50 INJECTION, SOLUTION INTRAVENOUS at 13:36

## 2022-05-24 RX ADMIN — PHENYLEPHRINE HYDROCHLORIDE 100 MCG: 10 INJECTION INTRAVENOUS at 13:06

## 2022-05-24 RX ADMIN — ROCURONIUM BROMIDE 10 MG: 50 INJECTION, SOLUTION INTRAVENOUS at 13:33

## 2022-05-24 RX ADMIN — LABETALOL HYDROCHLORIDE 10 MG: 5 INJECTION, SOLUTION INTRAVENOUS at 14:52

## 2022-05-24 RX ADMIN — SODIUM CHLORIDE, POTASSIUM CHLORIDE, SODIUM LACTATE AND CALCIUM CHLORIDE: 600; 310; 30; 20 INJECTION, SOLUTION INTRAVENOUS at 10:18

## 2022-05-24 RX ADMIN — ONDANSETRON HYDROCHLORIDE 4 MG: 2 INJECTION, SOLUTION INTRAVENOUS at 13:01

## 2022-05-24 RX ADMIN — NEOSTIGMINE METHYLSULFATE 4 MG: 1 INJECTION, SOLUTION INTRAVENOUS at 14:27

## 2022-05-24 RX ADMIN — METHOCARBAMOL 750 MG: 750 TABLET ORAL at 15:44

## 2022-05-24 RX ADMIN — GABAPENTIN 100 MG: 100 CAPSULE ORAL at 19:37

## 2022-05-24 RX ADMIN — SODIUM CHLORIDE: 9 INJECTION, SOLUTION INTRAVENOUS at 18:46

## 2022-05-24 RX ADMIN — GLYCOPYRROLATE 0.2 MG: 0.2 INJECTION, SOLUTION INTRAMUSCULAR; INTRAVENOUS at 11:53

## 2022-05-24 RX ADMIN — SODIUM CHLORIDE, POTASSIUM CHLORIDE, SODIUM LACTATE AND CALCIUM CHLORIDE: 600; 310; 30; 20 INJECTION, SOLUTION INTRAVENOUS at 11:43

## 2022-05-24 RX ADMIN — MIDAZOLAM 2 MG: 1 INJECTION INTRAMUSCULAR; INTRAVENOUS at 11:43

## 2022-05-24 RX ADMIN — GLYCOPYRROLATE 0.6 MG: 0.2 INJECTION, SOLUTION INTRAMUSCULAR; INTRAVENOUS at 14:27

## 2022-05-24 RX ADMIN — HYDROMORPHONE HYDROCHLORIDE 0.25 MG: 1 INJECTION, SOLUTION INTRAMUSCULAR; INTRAVENOUS; SUBCUTANEOUS at 13:30

## 2022-05-24 RX ADMIN — Medication 2 G: at 11:43

## 2022-05-24 RX ADMIN — FENTANYL CITRATE 25 MCG: 50 INJECTION, SOLUTION INTRAMUSCULAR; INTRAVENOUS at 14:43

## 2022-05-24 RX ADMIN — ACETAMINOPHEN 975 MG: 325 TABLET, FILM COATED ORAL at 18:46

## 2022-05-24 RX ADMIN — LIDOCAINE HYDROCHLORIDE 50 MG: 10 INJECTION, SOLUTION INFILTRATION; PERINEURAL at 11:51

## 2022-05-24 RX ADMIN — HYDROMORPHONE HYDROCHLORIDE 0.25 MG: 1 INJECTION, SOLUTION INTRAMUSCULAR; INTRAVENOUS; SUBCUTANEOUS at 13:36

## 2022-05-24 ASSESSMENT — ACTIVITIES OF DAILY LIVING (ADL)
ADLS_ACUITY_SCORE: 20

## 2022-05-24 ASSESSMENT — LIFESTYLE VARIABLES: TOBACCO_USE: 1

## 2022-05-24 NOTE — INTERVAL H&P NOTE
"I have reviewed the surgical (or preoperative) H&P that is linked to this encounter, and examined the patient. There are no significant changes    Clinical Conditions Present on Arrival:  Clinically Significant Risk Factors Present on Admission                   # Obesity: Estimated body mass index is 36.42 kg/m  as calculated from the following:    Height as of 5/17/22: 1.721 m (5' 7.75\").    Weight as of 5/17/22: 107.9 kg (237 lb 12.8 oz).       "

## 2022-05-24 NOTE — PHARMACY-ADMISSION MEDICATION HISTORY
Pharmacy reviewed prior to admission med list from pre-admitting rn, MATTHIEU Payan.        Prior to Admission medications    Medication Sig Last Dose Taking? Auth Provider   Cyanocobalamin (B-12 PO) Take 1 tablet by mouth daily 5/17/2022 Yes Reported, Patient   MAGNESIUM PO Take 1 tablet by mouth daily 5/17/2022 Yes Reported, Patient   Multiple Vitamins-Minerals (MULTIVITAMIN ADULTS PO) Take 1 tablet by mouth daily 5/17/2022 Yes Reported, Patient   oxyCODONE-acetaminophen (PERCOCET) 5-325 MG tablet Take 1 tablet by mouth 3 times daily as needed for severe pain 5/22/2022 at 2000 Yes Shauna Lin MD   POTASSIUM PO Take 1 tablet by mouth daily 5/17/2022 Yes Reported, Patient

## 2022-05-24 NOTE — ANESTHESIA CARE TRANSFER NOTE
Patient: Brenna Fournier    Procedure: Procedure(s):  Lumbar 2-Lumbar 3, Lumbar 3-Lumbar 4, Lumbar Decompression       Diagnosis: Lumbar stenosis [M48.061]  Diagnosis Additional Information: No value filed.    Anesthesia Type:   General     Note:    Oropharynx: oropharynx clear of all foreign objects and spontaneously breathing  Level of Consciousness: awake and drowsy  Oxygen Supplementation: face mask  Level of Supplemental Oxygen (L/min / FiO2): 6  Independent Airway: airway patency satisfactory and stable  Dentition: dentition unchanged  Vital Signs Stable: post-procedure vital signs reviewed and stable  Report to RN Given: handoff report given  Patient transferred to: PACU  Comments: Needs sleep study, resting comfortably, suctioned and strong.   Handoff Report: Identifed the Patient, Identified the Reponsible Provider, Reviewed the pertinent medical history, Discussed the surgical course, Reviewed Intra-OP anesthesia mangement and issues during anesthesia and Allowed opportunity for questions and acknowledgement of understanding      Vitals:  Vitals Value Taken Time   /93 05/24/22 1514   Temp     Pulse 59 05/24/22 1516   Resp 15 05/24/22 1516   SpO2 95 % 05/24/22 1516   Vitals shown include unvalidated device data.    Electronically Signed By: CITLALLI Chan CRNA  May 24, 2022  3:17 PM

## 2022-05-24 NOTE — ANESTHESIA PROCEDURE NOTES
Airway         Procedure Start/Stop Times: 5/24/2022 11:55 AM  Staff -        Anesthesiologist:  Brenna Rain APRN CRNA       Performed By: CRNAIndications and Patient Condition         Mask difficulty assessment: 1 - vent by mask    Final Airway Details       Final airway type: endotracheal airway       Successful airway: ETT - single and Oral  Endotracheal Airway Details        ETT size (mm): 7.0       Cuffed: yes       Successful intubation technique: direct laryngoscopy       DL Blade Type: Anders 2       Grade View of Cords: 1       Adjucts: stylet       Position: Right       Bite block used: Soft    Post intubation assessment        Placement verified by: capnometry, equal breath sounds and chest rise        Number of attempts at approach: 1       Secured with: plastic tape       Ease of procedure: easy       Dentition: Intact    Medication(s) Administered   Medication Administration Time: 5/24/2022 11:55 AM

## 2022-05-24 NOTE — PROCEDURES
Date of service:  5/24/2022    Surgeon: Roel Carter M.D.    Assistant: Yoel Mayfield PA-C    Preoperative diagnosis:    1.  Spinal stenosis, L2-L3, L3-L4    Postoperative diagnosis:    1.  Same as peroperative    Procedure: Open lumbar decompression, L2-L3, L3-L4    Anesthesia: General endotracheal tube anesthesia.    Estimated blood loss: 150mL    Complications: No intraoperative complications noted.    Operative indications:     1. Neurogenic claudication with bilateral lumbar radiculopathy  2. Severe stenosis L2-L3 and L3-4  3. Ankylosed L5-S1 segment  4. Degenerative disc disease L4-L5  5. Worsening symptoms, causing currently severe disability  6. Patient unable to stand or walk for more than couple of minutes or even quarter of a block  7. Patient has failed aggressive nonsurgical treatment for the last 5 months  A. Symptoms severely affecting daily activities and quality of life     57 year old female with above surgical indications is seen today at pre-operative holding area.  We reviewed patient history, physical examination and advance imaging, there has been no change since last clinical visit. We again discussed the intra operative, post operative, rehabilitation, recovery and expectations details of the proposed procedure.     We thoroughly discussed details of surgery including the benefits and risks associated to the particular procedure and from the surgical process in general.  We went through specific details of risks again as detailed on our surgical discussion clinical note on 5/04/2022.  Today before surgery we again discussed the intraoperative,  postoperative, post surgical rehabilitation, recovery and general expectations of surgery.      We then discussed risks of surgery which includes but are not limited to:    Anesthesia complications including death, stroke, heart attack, pulmonary embolism, respiratory depression and deep vein thrombosis. Approach complications including infection,  dural tears, which could need and end up with a primary or secondary repair needing added length of stay, laying flat for multiple days, subsequent surgeries for repair and/or the need of a lumbar drain, neurological damage including loss of motor strength persistent pain and/or abnormal sensation over one or several dermatomal distributions, expanding hematoma requiring subsequent surgery for evacuation, vision loss, temorary or permanent peripheral neuropathy due to positioning and iatrogenic instability following decompression.    Patient understood the stated diagnosis and the recommended treatment option including accepting the risks associated with it. Patient would like to proceed with surgical management, all questions were answered.         Description of operation: Patient was seen and identified in the preoperative holding area.  The appropriate site was marked with surgeon's initials.  Written consent was obtained and documented.  Pneumatic boots were applied for DVT prophylaxis.  The patient was brought back to the operating room where they were induced under general anesthesia.  IV antibiotic prophylaxis was given according to patient's allergies and adverse reactions. After induction of general anesthesia, the patient was positioned prone onto a Stepan frame.  Care was taken to make sure extremities were well padded.  The lumbar spine was sterilely prepped and draped in the usual sterile fashion.  A timeout was taken per hospital protocol.  A midline skin incision was made and soft tissue resection carried out to the lateral pars at L2-L3, L3-L4.  Kocher was placed in the L3 spinous process confirming we were at the appropriate levels on the lateral fluoroscopy.  We then removed the interspinous ligament of L2-L3 and L3-L4.  We then removed the spinous process of the L2 and L3 lamina.  We then used a Midas to thin the laminas down to the ligamentum flavum and then resected the central lamina at L3 and  created bilateral hemilaminotomies at L2-L3.  I then performed bilateral medial  facetectomies and foraminotomies at L2-L3 and L3-L4.    At the end of the surgery was able to easily pass a small ball-tipped probe through the subarticular recess and down through the neuro foramen bilaterally.  I was satisfied with the decompression.   A medium Hemovac drain was placed, and the wound was closed in layers.  Sterile dressings were used to cover the wound.    There were no intraoperative complications during today's case.  Sponge and needle counts were correct at the end of the case.  Estimated blood loss was about 150mL.    Yoel Mayfield,  PAC, assisted with the procedure.  His role included, but was not limited to, skilled use of the suction to maintain a clear visual field close to the dura as well as multilayer wound closure.  Skilled spine PA was required for both safety and efficacy during this procedure.  The series could not be safely replicated by other operating room personnel.

## 2022-05-24 NOTE — ANESTHESIA POSTPROCEDURE EVALUATION
Patient: Brenna Fournier    Procedure: Procedure(s):  Lumbar 2-Lumbar 3, Lumbar 3-Lumbar 4, Lumbar Decompression       Anesthesia Type:  General    Note:     Postop Pain Control: Uneventful            Sign Out: Well controlled pain   PONV: No   Neuro/Psych: Uneventful            Sign Out: Acceptable/Baseline neuro status   Airway/Respiratory: Uneventful            Sign Out: Acceptable/Baseline resp. status   CV/Hemodynamics: Uneventful            Sign Out: Acceptable CV status; No obvious hypovolemia; No obvious fluid overload   Other NRE: NONE   DID A NON-ROUTINE EVENT OCCUR? No           Last vitals:  Vitals Value Taken Time   /91 05/24/22 1550   Temp 98.8  F (37.1  C) 05/24/22 1515   Pulse 56 05/24/22 1552   Resp 9 05/24/22 1552   SpO2 97 % 05/24/22 1552   Vitals shown include unvalidated device data.    Electronically Signed By: Elias Gonzalez DO  May 24, 2022  3:53 PM

## 2022-05-24 NOTE — ANESTHESIA PREPROCEDURE EVALUATION
Anesthesia Pre-Procedure Evaluation    Patient: Brenna Fournier   MRN: [id number] : 1965        Procedure : Procedure(s):  Lumbar 2-Lumbar 3, Lumbar 3-Lumbar 4, Lumbar Decompression          Past Medical History:   Diagnosis Date     Cystic acne      Elevated blood pressure reading without diagnosis of hypertension 2019     Jaw pain      Localized pain of knee joint      Obesity       Past Surgical History:   Procedure Laterality Date     COLONOSCOPY  2020     HC KNEE SCOPE, DIAGNOSTIC      Description: Arthroscopy Knee Right;  Recorded: 2012;     HC SHLDR ARTHROSCOP,SURG,W/REMOVAL,LOOSE/FB      Description: Shoulder Arthroscopy;  Recorded: 2012;     JOINT REPLACEMENT       THYROIDECTOMY, PARTIAL Left 2019    Procedure: LEFT THYROID LOBECTOMY;  Surgeon: Santosh Doe MD;  Location: [facility];  Service: ENT     TRANSPLANT      Knee Replacement      Allergies   Allergen Reactions     Gadolinium-Containing Contrast Media [Gadolinium Derivatives] Anaphylaxis and Itching     Diatrizoate Meglumine [Diatrizoate] Itching     5 minutes post CT injection, patient noted her eyes and ears were extremely itchy     Esomeprazole Sodium [Esomeprazole] Unknown     Swelling     Gluten [Gluten Meal] Itching     Hydrocodone-Acetaminophen Unknown     Hyperactivity      Latex Unknown     Omeprazole Unknown     Swelling- ankles      Social History     Tobacco Use     Smoking status: Current Every Day Smoker     Packs/day: 0.50     Years: 25.00     Pack years: 12.50     Types: Cigarettes     Smokeless tobacco: Never Used     Tobacco comment: Have stopped off and on   Substance Use Topics     Alcohol use: Yes     Comment: 10 drinks per week      Wt Readings from Last 1 Encounters:   22 107.9 kg (237 lb 14.4 oz)        Anesthesia Evaluation            ROS/MED HX  ENT/Pulmonary:     (+) tobacco use,     Neurologic:  - neg neurologic ROS     Cardiovascular:  - neg cardiovascular ROS      METS/Exercise Tolerance:     Hematologic:  - neg hematologic  ROS     Musculoskeletal:  - neg musculoskeletal ROS     GI/Hepatic:     (+) hiatal hernia,     Renal/Genitourinary:  - neg Renal ROS     Endo: Comment: .Body mass index is 36.44 kg/m .      (+) thyroid problem, Obesity,     Psychiatric/Substance Use:  - neg psychiatric ROS     Infectious Disease:  - neg infectious disease ROS     Malignancy:  - neg malignancy ROS     Other:  - neg other ROS          Physical Exam    Airway        Mallampati: II    Neck ROM: full     Respiratory Devices and Support         Dental           Cardiovascular   cardiovascular exam normal       Rhythm and rate: regular     Pulmonary           (+) decreased breath sounds           OUTSIDE LABS:  CBC:   Lab Results   Component Value Date    WBC 7.0 05/17/2022    WBC 7.6 10/19/2021    HGB 16.1 (H) 05/17/2022    HGB 14.9 10/19/2021    HCT 48.0 (H) 05/17/2022    HCT 43.9 10/19/2021     05/17/2022     10/19/2021     BMP:   Lab Results   Component Value Date     05/17/2022     07/29/2021    POTASSIUM 4.5 05/17/2022    POTASSIUM 4.1 07/29/2021    CHLORIDE 105 05/17/2022    CHLORIDE 105 07/29/2021    CO2 26 05/17/2022    CO2 22 07/29/2021    BUN 22 05/17/2022    BUN 23 (H) 07/29/2021    CR 0.84 05/17/2022    CR 0.86 07/29/2021    GLC 89 05/17/2022    GLC 94 07/29/2021     COAGS:   Lab Results   Component Value Date    PTT 31 05/17/2022    INR 1.03 05/17/2022     POC: No results found for: BGM, HCG, HCGS  HEPATIC:   Lab Results   Component Value Date    ALBUMIN 4.0 05/17/2022    PROTTOTAL 6.8 05/17/2022    ALT 27 05/17/2022    AST 26 05/17/2022    ALKPHOS 94 05/17/2022    BILITOTAL 0.7 05/17/2022     OTHER:   Lab Results   Component Value Date    A1C 5.2 07/05/2018    MEGA 8.9 05/17/2022    LIPASE 19 08/07/2020    TSH 3.63 05/17/2022    T3 88 01/11/2018       Anesthesia Plan    ASA Status:  3      Anesthesia Type: General.     - Airway: ETT   Induction:  Intravenous, Propofol.   Maintenance: Balanced.        Consents    Anesthesia Plan(s) and associated risks, benefits, and realistic alternatives discussed. Questions answered and patient/representative(s) expressed understanding.    - Discussed:     - Discussed with:  Patient         Postoperative Care    Pain management: IV analgesics, Oral pain medications, Multi-modal analgesia.   PONV prophylaxis: Ondansetron (or other 5HT-3), Dexamethasone or Solumedrol     Comments:                Elias Gonzalez, DO

## 2022-05-24 NOTE — BRIEF OP NOTE
[facility]    Brief Operative Note    Pre-operative diagnosis: Lumbar stenosis [M48.061]  Post-operative diagnosis Same as pre-operative diagnosis    Procedure: Procedure(s):  Lumbar 2-Lumbar 3, Lumbar 3-Lumbar 4, Lumbar Decompression  Surgeon: Surgeon(s) and Role:     * Roel Holley MD - Primary     * Yoel Mayfield PA-C - Assisting  Anesthesia: General   Estimated Blood Loss: 150mL    Drains: Hemovac  Specimens: * No specimens in log *  Findings:   None.  Complications: None.  Implants: * No implants in log *

## 2022-05-25 ENCOUNTER — APPOINTMENT (OUTPATIENT)
Dept: PHYSICAL THERAPY | Facility: CLINIC | Age: 57
End: 2022-05-25
Attending: ORTHOPAEDIC SURGERY
Payer: COMMERCIAL

## 2022-05-25 VITALS
HEART RATE: 68 BPM | RESPIRATION RATE: 12 BRPM | HEIGHT: 68 IN | OXYGEN SATURATION: 98 % | TEMPERATURE: 98.3 F | SYSTOLIC BLOOD PRESSURE: 140 MMHG | DIASTOLIC BLOOD PRESSURE: 73 MMHG | WEIGHT: 237.9 LBS | BODY MASS INDEX: 36.06 KG/M2

## 2022-05-25 LAB — GLUCOSE BLDC GLUCOMTR-MCNC: 139 MG/DL (ref 70–99)

## 2022-05-25 PROCEDURE — 999N000111 HC STATISTIC OT IP EVAL DEFER

## 2022-05-25 PROCEDURE — G0378 HOSPITAL OBSERVATION PER HR: HCPCS

## 2022-05-25 PROCEDURE — 97530 THERAPEUTIC ACTIVITIES: CPT | Mod: GP | Performed by: PHYSICAL THERAPIST

## 2022-05-25 PROCEDURE — 250N000011 HC RX IP 250 OP 636: Performed by: ORTHOPAEDIC SURGERY

## 2022-05-25 PROCEDURE — 97161 PT EVAL LOW COMPLEX 20 MIN: CPT | Mod: GP | Performed by: PHYSICAL THERAPIST

## 2022-05-25 PROCEDURE — 250N000013 HC RX MED GY IP 250 OP 250 PS 637: Performed by: ORTHOPAEDIC SURGERY

## 2022-05-25 PROCEDURE — 97116 GAIT TRAINING THERAPY: CPT | Mod: GP | Performed by: PHYSICAL THERAPIST

## 2022-05-25 RX ORDER — OXYCODONE HYDROCHLORIDE 5 MG/1
5 TABLET ORAL EVERY 4 HOURS PRN
Qty: 20 TABLET | Refills: 0 | Status: ON HOLD | OUTPATIENT
Start: 2022-05-25 | End: 2022-11-22

## 2022-05-25 RX ORDER — METHOCARBAMOL 750 MG/1
750 TABLET, FILM COATED ORAL EVERY 6 HOURS PRN
Qty: 45 TABLET | Refills: 1 | Status: ON HOLD | OUTPATIENT
Start: 2022-05-25 | End: 2022-11-23

## 2022-05-25 RX ORDER — GABAPENTIN 100 MG/1
100 CAPSULE ORAL 3 TIMES DAILY
Qty: 90 CAPSULE | Refills: 1 | Status: SHIPPED | OUTPATIENT
Start: 2022-05-25 | End: 2022-11-02

## 2022-05-25 RX ADMIN — CEFAZOLIN SODIUM 2 G: 2 INJECTION, SOLUTION INTRAVENOUS at 02:17

## 2022-05-25 RX ADMIN — GABAPENTIN 100 MG: 100 CAPSULE ORAL at 07:55

## 2022-05-25 RX ADMIN — ACETAMINOPHEN 975 MG: 325 TABLET, FILM COATED ORAL at 02:17

## 2022-05-25 RX ADMIN — METHOCARBAMOL 750 MG: 750 TABLET ORAL at 10:13

## 2022-05-25 RX ADMIN — SENNOSIDES AND DOCUSATE SODIUM 1 TABLET: 50; 8.6 TABLET ORAL at 07:55

## 2022-05-25 RX ADMIN — OXYCODONE HYDROCHLORIDE 10 MG: 5 TABLET ORAL at 12:17

## 2022-05-25 RX ADMIN — ACETAMINOPHEN 975 MG: 325 TABLET, FILM COATED ORAL at 10:13

## 2022-05-25 RX ADMIN — OXYCODONE HYDROCHLORIDE 10 MG: 5 TABLET ORAL at 07:55

## 2022-05-25 RX ADMIN — POLYETHYLENE GLYCOL 3350 17 G: 17 POWDER, FOR SOLUTION ORAL at 07:55

## 2022-05-25 RX ADMIN — OXYCODONE HYDROCHLORIDE 10 MG: 5 TABLET ORAL at 02:16

## 2022-05-25 ASSESSMENT — ACTIVITIES OF DAILY LIVING (ADL)
ADLS_ACUITY_SCORE: 19

## 2022-05-25 NOTE — PLAN OF CARE
Arrived to room 647 from PACU at 1800 via cart, oriented to room and call system.    Patient vital signs are at baseline: Yes  Patient able to ambulate as they were prior to admission or with assist devices provided by therapies during their stay:  Yes-Ax1, using gait belt, and walker.  Patient MUST void prior to discharge:  Yes  Patient able to tolerate oral intake:  Yes-regular diet.  Pain has adequate pain control using Oral analgesics:  Yes-PO oxycodone, robaxin, and scheduled tylenol.  Does patient have an identified :  Yes-SO Philippe  Has goal D/C date and time been discussed with patient:  Yes     Pt A&O x4. Dressing CDI. Hemovac patent and draining.

## 2022-05-25 NOTE — PLAN OF CARE
Goal Outcome Evaluation:      Patient vital signs are at baseline: Yes  Patient able to ambulate as they were prior to admission or with assist devices provided by therapies during their stay:  Yes  Patient MUST void prior to discharge:  Yes  Patient able to tolerate oral intake:  Yes  Pain has adequate pain control using Oral analgesics:  Yes  Does patient have an identified :  Yes  Has goal D/C date and time been discussed with patient:  Yes    Dressings CDI. No drainage noted.

## 2022-05-25 NOTE — PLAN OF CARE
Goal Outcome Evaluation:    Plan of Care Reviewed With: patient, spouse     Reviewed discharge instructions , no further questions.  Pt is discharging to home.

## 2022-05-25 NOTE — PROGRESS NOTES
Patient vital signs are at baseline: Yes  Patient able to ambulate as they were prior to admission or with assist devices provided by therapies during their stay:  Yes  Patient MUST void prior to discharge:  Yes  Patient able to tolerate oral intake:  Yes  Pain has adequate pain control using Oral analgesics:  Yes  Does patient have an identified :  Yes  Has goal D/C date and time been discussed with patient:  Yes       A & O x4, VSS. Hemovac removed, minimal output. PRN oxy given for pain control. Patient SBA with ambulation. Dressing with scant drainage. Will jd to monitor.

## 2022-05-25 NOTE — PLAN OF CARE
OT: Orders received. Chart reviewed and discussed with care team.  OT not indicated as pt mobilizing well, has no questions/concerns regarding ADLs and will have spouse support as needed at discharge.  Defer discharge recommendations to PT/care team.  Will complete IP OT orders.

## 2022-05-25 NOTE — PLAN OF CARE
Physical Therapy Discharge Summary    Reason for therapy discharge:    All goals and outcomes met, no further needs identified.    Progress towards therapy goal(s). See goals on Care Plan in [facility] electronic health record for goal details.  Goals met    Therapy recommendation(s):    No further therapy is recommended.

## 2022-05-25 NOTE — DISCHARGE SUMMARY
Physician Discharge Summary     Patient ID:  Brenna Fournier  [id number]  57 year old  1965    Admit date: 5/24/2022    Discharge date and time: 5/25/2022     Admitting Physician: Roel Holley MD     Discharge Physician:  Roel Holley MD       Admission Diagnoses: Lumbar stenosis [M48.061]  Spinal stenosis of lumbar region with neurogenic claudication [M48.062]    Discharge Diagnoses: Lumbar stenosis    Admission Condition: good    Discharged Condition: good    Indication for Admission: spinal surgery    Hospital Course: unremarkable    Consults: hospitalist    Significant Diagnostic Studies: none    Treatments: L2-L4 lumbar decompression  Discharge Exam:     5/5 strength L3-S1 bilateral  No sensation disturbances  Negative straight leg test  DP 2+ bilaterally     Incision is covered, clean and dry.      Disposition: home    Patient Instructions:   Current Discharge Medication List      START taking these medications    Details   gabapentin (NEURONTIN) 100 MG capsule Take 1 capsule (100 mg) by mouth 3 times daily  Qty: 90 capsule, Refills: 1    Associated Diagnoses: Spinal stenosis of lumbar region with neurogenic claudication      methocarbamol (ROBAXIN) 750 MG tablet Take 1 tablet (750 mg) by mouth every 6 hours as needed for muscle spasms  Qty: 45 tablet, Refills: 1    Associated Diagnoses: Spinal stenosis of lumbar region with neurogenic claudication      oxyCODONE (ROXICODONE) 5 MG tablet Take 1 tablet (5 mg) by mouth every 4 hours as needed for breakthrough pain or severe pain  Qty: 20 tablet, Refills: 0    Associated Diagnoses: Spinal stenosis of lumbar region with neurogenic claudication         CONTINUE these medications which have NOT CHANGED    Details   Cyanocobalamin (B-12 PO) Take 1 tablet by mouth daily      MAGNESIUM PO Take 1 tablet by mouth daily      Multiple Vitamins-Minerals (MULTIVITAMIN ADULTS PO) Take 1 tablet by mouth daily      POTASSIUM PO Take 1 tablet by  mouth daily         STOP taking these medications       oxyCODONE-acetaminophen (PERCOCET) 5-325 MG tablet Comments:   Reason for Stopping:             Activity: activity as tolerated  Diet: regular diet  Wound Care: keep wound clean and dry    Follow-up with Dr. Carter in 6 weeks.    Signed:  Roel Holley MD  5/25/2022  6:13 AM

## 2022-05-25 NOTE — PROGRESS NOTES
Ortho Spine Follow up Note      S/P L2-L4 lumbar decompression; POD#1    S:    Leg pain feeling better  Incisional pain under control  No headaches  Out of bed without complications      O:    5/5 strength L3-S1 bilateral  No sensation disturbances  Negative straight leg test  DP 2+ bilaterally    Incision is covered, clean and dry.        A:    S/p L2-L4 decompression  No neurological deficits  Extremity pain significantly improved    Oriented about post operative care, rehabilitation, recovery and expectations. Patient has a follow up appointment scheduled. She has our contact information for any questions or concerns.  She understood. All questions answered.    Plan:    Out of bed  Pain control  Discharge home today    Roel Holley MD on 5/25/2022 at 6:07 AM

## 2022-05-25 NOTE — CONSULTS
"Hospitalist consult received.  When reviewing patient, patient has already been discharged from surgical team.  Patient chart reviewed but not seen.    Patient's H&P reviewed from outpatient clinic.  \"57-year-old female with no sniffing past medical history who underwent open lumbar decompression at L2-L3, L3-L4. No active symptoms or signs of infection.  EKG is within normal limits with no concerning cardiac history.  She actually had a full cardiac work-up recently and included stress testing and CT calcium score of 0 in the last year.  She is aware of the importance of tobacco cessation on her healing process and is hoping that she will be at that point after surgery.\"    Review of vitals and discussed with nursing staff.  Fasting glucose today slightly elevated at 139.  No hx of DM   Will need follow up with PCP. Recommend outpt HgbA1c.   Patient doing well with no needs.  Ok to discharge form medical standpoint.     "

## 2022-05-25 NOTE — PROGRESS NOTES
05/25/22 0943   Quick Adds   Type of Visit Initial PT Evaluation   Living Environment   Living Environment Comments Pt lives in home with spouse; 2 [address] w/o railing; 7 steps down with L railing descending.   Self-Care   Usual Activity Tolerance moderate   Current Activity Tolerance moderate   Equipment Currently Used at Home shower chair;raised toilet seat;walker, rolling;crutches   Fall history within last six months no   General Information   Onset of Illness/Injury or Date of Surgery 05/24/22   Referring Physician Roel Holley MD   Patient/Family Therapy Goals Statement (PT) To improve nerve discomfort and mobility   Pertinent History of Current Problem (include personal factors and/or comorbidities that impact the POC) Pt is a 57 year old female POD1 s/p L2-4 decompression.   Existing Precautions/Restrictions spinal   Cognition   Affect/Mental Status (Cognition) WFL   Orientation Status (Cognition) oriented x 4   Follows Commands (Cognition) WFL   Pain Assessment   Patient Currently in Pain Yes, see Vital Sign flowsheet  (6/10 LBP)   Range of Motion (ROM)   Range of Motion ROM deficits secondary to surgical procedure   ROM Comment decreased lumbar ROM, B LE WFL   Strength (Manual Muscle Testing)   Strength Comments able to complete B SLR   Bed Mobility   Comment, (Bed Mobility) Misha supine to sit   Transfers   Comment, (Transfers) CGA sit <> stand with FWW   Gait/Stairs (Locomotion)   Distance in Feet (Required for LE Total Joints) 14   Pattern (Gait) step-to   Deviations/Abnormal Patterns (Gait) base of support, wide;gait speed decreased   Comment, (Gait/Stairs) CGA with FWW   Balance   Balance Comments good sitting; fair+ standing with FWW   Sensory Examination   Sensory Perception Comments reports improvement of B LE N/T since prior to surgery; light touch intact   Clinical Impression   Criteria for Skilled Therapeutic Intervention Yes, treatment indicated   PT Diagnosis (PT) impaired  functional mobility   Influenced by the following impairments impaired balance, decreased ROM, spinal prec   Functional limitations due to impairments impaired bed mobility, transfers, ambulation, stairs   Clinical Presentation (PT Evaluation Complexity) Stable/Uncomplicated   Clinical Presentation Rationale Pt is medically stable   Clinical Decision Making (Complexity) low complexity   Planned Therapy Interventions (PT) balance training;bed mobility training;cryotherapy;gait training;home exercise program;patient/family education;ROM (range of motion);stair training;transfer training   Anticipated Equipment Needs at Discharge (PT) walker, rolling   Risk & Benefits of therapy have been explained evaluation/treatment results reviewed;care plan/treatment goals reviewed;risks/benefits reviewed;current/potential barriers reviewed;participants voiced agreement with care plan;participants included;patient;spouse/significant other   PT Discharge Planning   PT Discharge Recommendation (DC Rec) home with assist   PT Rationale for DC Rec Patient able to navigate over 200 ft of mobility with FWW and SBA and managed 2x4 stairs with SBA and unilateral railing. Pt needing Misha for bed mobility, spouse chiquita to provide assistance at home.   Plan of Care Review   Plan of Care Reviewed With patient;spouse   Total Evaluation Time   Total Evaluation Time (Minutes) 10   Physical Therapy Goals   PT Frequency One time eval and treatment only   PT Predicted Duration/Target Date for Goal Attainment 05/25/22   PT Goals Bed Mobility;Transfers;Gait;Stairs   PT: Bed Mobility Minimal assist;Supine to/from sit;Within precautions   PT: Transfers Supervision/stand-by assist;Sit to/from stand;Assistive device   PT: Gait Supervision/stand-by assist;150 feet;Assistive device   PT: Stairs 7 stairs;Minimal assist;Rail on left

## 2022-06-29 ENCOUNTER — TRANSFERRED RECORDS (OUTPATIENT)
Dept: HEALTH INFORMATION MANAGEMENT | Facility: CLINIC | Age: 57
End: 2022-06-29

## 2022-08-24 ENCOUNTER — TRANSFERRED RECORDS (OUTPATIENT)
Dept: HEALTH INFORMATION MANAGEMENT | Facility: CLINIC | Age: 57
End: 2022-08-24

## 2022-09-22 ENCOUNTER — TRANSFERRED RECORDS (OUTPATIENT)
Dept: HEALTH INFORMATION MANAGEMENT | Facility: CLINIC | Age: 57
End: 2022-09-22

## 2022-10-10 DIAGNOSIS — Z01.812 PRE-OPERATIVE LABORATORY EXAMINATION: Primary | ICD-10-CM

## 2022-10-29 ENCOUNTER — HEALTH MAINTENANCE LETTER (OUTPATIENT)
Age: 57
End: 2022-10-29

## 2022-11-02 ENCOUNTER — OFFICE VISIT (OUTPATIENT)
Dept: FAMILY MEDICINE | Facility: CLINIC | Age: 57
End: 2022-11-02
Payer: COMMERCIAL

## 2022-11-02 VITALS
WEIGHT: 240.13 LBS | SYSTOLIC BLOOD PRESSURE: 139 MMHG | DIASTOLIC BLOOD PRESSURE: 89 MMHG | HEIGHT: 68 IN | HEART RATE: 77 BPM | BODY MASS INDEX: 36.39 KG/M2

## 2022-11-02 DIAGNOSIS — Z01.818 PREOP GENERAL PHYSICAL EXAM: Primary | ICD-10-CM

## 2022-11-02 DIAGNOSIS — Z23 IMMUNIZATION DUE: ICD-10-CM

## 2022-11-02 DIAGNOSIS — Z71.6 ENCOUNTER FOR TOBACCO USE CESSATION COUNSELING: ICD-10-CM

## 2022-11-02 DIAGNOSIS — M48.062 SPINAL STENOSIS OF LUMBAR REGION WITH NEUROGENIC CLAUDICATION: ICD-10-CM

## 2022-11-02 LAB
ANION GAP SERPL CALCULATED.3IONS-SCNC: 12 MMOL/L (ref 7–15)
BUN SERPL-MCNC: 32.9 MG/DL (ref 8–23)
CALCIUM SERPL-MCNC: 8.5 MG/DL (ref 8.6–10)
CHLORIDE SERPL-SCNC: 105 MMOL/L (ref 98–107)
CREAT SERPL-MCNC: 0.89 MG/DL (ref 0.51–0.95)
DEPRECATED HCO3 PLAS-SCNC: 22 MMOL/L (ref 22–29)
ERYTHROCYTE [DISTWIDTH] IN BLOOD BY AUTOMATED COUNT: 12.9 % (ref 10–15)
GFR SERPL CREATININE-BSD FRML MDRD: 74 ML/MIN/1.73M2
GLUCOSE SERPL-MCNC: 97 MG/DL (ref 70–99)
HCT VFR BLD AUTO: 44 % (ref 35–47)
HGB BLD-MCNC: 15 G/DL (ref 11.7–15.7)
MCH RBC QN AUTO: 31.8 PG (ref 26.5–33)
MCHC RBC AUTO-ENTMCNC: 34.1 G/DL (ref 31.5–36.5)
MCV RBC AUTO: 93 FL (ref 78–100)
PLATELET # BLD AUTO: 229 10E3/UL (ref 150–450)
POTASSIUM SERPL-SCNC: 4.1 MMOL/L (ref 3.4–5.3)
RBC # BLD AUTO: 4.72 10E6/UL (ref 3.8–5.2)
SODIUM SERPL-SCNC: 139 MMOL/L (ref 136–145)
WBC # BLD AUTO: 6.4 10E3/UL (ref 4–11)

## 2022-11-02 PROCEDURE — 0124A COVID-19,PF,PFIZER BOOSTER BIVALENT: CPT | Performed by: FAMILY MEDICINE

## 2022-11-02 PROCEDURE — 85027 COMPLETE CBC AUTOMATED: CPT | Performed by: FAMILY MEDICINE

## 2022-11-02 PROCEDURE — 80048 BASIC METABOLIC PNL TOTAL CA: CPT | Performed by: FAMILY MEDICINE

## 2022-11-02 PROCEDURE — 90471 IMMUNIZATION ADMIN: CPT | Performed by: FAMILY MEDICINE

## 2022-11-02 PROCEDURE — 91312 COVID-19,PF,PFIZER BOOSTER BIVALENT: CPT | Performed by: FAMILY MEDICINE

## 2022-11-02 PROCEDURE — 90682 RIV4 VACC RECOMBINANT DNA IM: CPT | Performed by: FAMILY MEDICINE

## 2022-11-02 PROCEDURE — 99214 OFFICE O/P EST MOD 30 MIN: CPT | Mod: 25 | Performed by: FAMILY MEDICINE

## 2022-11-02 PROCEDURE — 36415 COLL VENOUS BLD VENIPUNCTURE: CPT | Performed by: FAMILY MEDICINE

## 2022-11-02 RX ORDER — NICOTINE 21 MG/24HR
1 PATCH, TRANSDERMAL 24 HOURS TRANSDERMAL EVERY 24 HOURS
Qty: 42 PATCH | Refills: 0 | Status: SHIPPED | OUTPATIENT
Start: 2022-11-02 | End: 2022-12-14

## 2022-11-02 NOTE — H&P (VIEW-ONLY)
[facility]  [address] [facility] [address]  Phone: [phone]  Fax: [phone]  Primary Provider: Ridge Emerson  Pre-op Performing Provider: RIDGE EMERSON      PREOPERATIVE EVALUATION:  Today's date: 11/2/2022    Brenna Fournier is a 57 year old female who presents for a preoperative evaluation.    Surgical Information:  Surgery/Procedure: Lumbar 3-4 decompression  Surgery Location: [facility]  Surgeon: Dr. Holley  Surgery Date: 11/22/22  Time of Surgery: 10 am  Where patient plans to recover: At home with family  Fax number for surgical facility: Note does not need to be faxed, will be available electronically in Epic.    Type of Anesthesia Anticipated: General    Assessment & Plan     The proposed surgical procedure is considered INTERMEDIATE risk.    1. Preop general physical exam  2. Spinal stenosis of lumbar region with neurogenic claudication  - Basic metabolic panel  (Ca, Cl, CO2, Creat, Gluc, K, Na, BUN); Future  - CBC with platelets; Future    Risks and Recommendations:  The patient has the following additional risks and recommendations for perioperative complications:   - No identified additional risk factors other than previously addressed    Medication Instructions:  Patient is on no chronic medications    RECOMMENDATION:  APPROVAL GIVEN to proceed with proposed procedure, without further diagnostic evaluation.    3. Encounter for tobacco use cessation counseling  - nicotine (NICODERM CQ) 14 MG/24HR 24 hr patch; Place 1 patch onto the skin every 24 hours for 42 days  Dispense: 42 patch; Refill: 0  - nicotine (NICODERM CQ) 7 MG/24HR 24 hr patch; Place 1 patch onto the skin every 24 hours  Dispense: 14 patch; Refill: 0    Still smoking, would like new prescription for nicotine patches.  Prescription sent to pharmacy.    4. Immunization due  - INFLUENZA QUAD, RECOMBINANT, P-FREE (RIV4) (FLUBLOK) AGE 50-64 [[id number]]  - COVID-19,PF,PFIZER  BOOSTER BIVALENT (12+YRS)     Will check with insurance on shingles shot coverage.  Defer pneumococcal vaccine today as we are administering 2 other vaccines.    Subjective     HPI related to upcoming procedure:     LUMBAR DECOMPRESSION: Right foot feeling cold, otherwise is doing okay. Did start with recurrent pain. Covid test scheduled 11/18/22.     HEALTH MAINTENANCE:   - Flu: will do today    - Covid: will do today    - Shingles: will check with insurance    - PCV: defer today      Preop Questions 10/26/2022   1. Have you ever had a heart attack or stroke? No   2. Have you ever had surgery on your heart or blood vessels, such as a stent placement, a coronary artery bypass, or surgery on an artery in your head, neck, heart, or legs? No   3. Do you have chest pain with activity? No   4. Do you have a history of  heart failure? No   5. Do you currently have a cold, bronchitis or symptoms of other infection? No   6. Do you have a cough, shortness of breath, or wheezing? No   7. Do you or anyone in your family have previous history of blood clots? No   8. Do you or does anyone in your family have a serious bleeding problem such as prolonged bleeding following surgeries or cuts? No   9. Have you ever had problems with anemia or been told to take iron pills? No   10. Have you had any abnormal blood loss such as black, tarry or bloody stools, or abnormal vaginal bleeding? No   11. Have you ever had a blood transfusion? No   12. Are you willing to have a blood transfusion if it is medically needed before, during, or after your surgery? Yes   13. Have you or any of your relatives ever had problems with anesthesia? No   14. Do you have sleep apnea, excessive snoring or daytime drowsiness? No   15. Do you have any artifical heart valves or other implanted medical devices like a pacemaker, defibrillator, or continuous glucose monitor? No   16. Do you have artificial joints? YES - right knee    17. Are you allergic to latex?  YES: blisters    18. Is there any chance that you may be pregnant? No       Health Care Directive:  Patient does not have a Health Care Directive or Living Will: Patient states has Advance Directive and will bring in a copy to clinic.    Preoperative Review of :   reviewed - controlled substances reflected in medication list.    Status of Chronic Conditions:  See problem list for active medical problems.  Problems all longstanding and stable, except as noted/documented.  See ROS for pertinent symptoms related to these conditions.      Review of Systems  Constitutional, neuro, ENT, endocrine, pulmonary, cardiac, gastrointestinal, genitourinary, musculoskeletal, integument and psychiatric systems are negative, except as otherwise noted.    Patient Active Problem List    Diagnosis Date Noted     Spinal stenosis of lumbar region with neurogenic claudication 05/19/2022     Priority: Medium     Tobacco dependence 05/19/2022     Priority: Medium     Low calcium levels 07/06/2021     Priority: Medium     Hiatal hernia 07/06/2021     Priority: Medium     Irritable bowel syndrome with both constipation and diarrhea 10/10/2020     Priority: Medium     Benign neoplasm of ascending colon 07/27/2020     Priority: Medium     History of colonic polyps 07/23/2020     Priority: Medium     Thyroid Diffuse Enlargement      Priority: Medium     Nontoxic Solitary Thyroid Nodule      Priority: Medium     Joint Pain, Localized In The Knee      Priority: Medium     Obesity      Priority: Medium      Past Medical History:   Diagnosis Date     Cystic acne      Elevated blood pressure reading without diagnosis of hypertension 09/25/2019     Jaw pain      Localized pain of knee joint      Obesity      Past Surgical History:   Procedure Laterality Date     COLONOSCOPY  6/2020     DECOMPRESSION LUMBAR TWO LEVELS N/A 5/24/2022    Procedure: Lumbar 2-Lumbar 3, Lumbar 3-Lumbar 4, Lumbar Decompression;  Surgeon: Roel Holley MD;   Location:  OR      KNEE SCOPE, DIAGNOSTIC      Description: Arthroscopy Knee Right;  Recorded: 11/21/2012;     HC SHLDR ARTHROSCOP,SURG,W/REMOVAL,LOOSE/FB      Description: Shoulder Arthroscopy;  Recorded: 11/21/2012;     JOINT REPLACEMENT       THYROIDECTOMY, PARTIAL Left 12/17/2019    Procedure: LEFT THYROID LOBECTOMY;  Surgeon: Santosh Doe MD;  Location: [facility];  Service: ENT     TRANSPLANT  2013    Knee Replacement     Current Outpatient Medications   Medication Sig Dispense Refill     Cyanocobalamin (B-12 PO) Take 1 tablet by mouth daily (Patient not taking: Reported on 11/2/2022)       gabapentin (NEURONTIN) 100 MG capsule Take 1 capsule (100 mg) by mouth 3 times daily 90 capsule 1     MAGNESIUM PO Take 1 tablet by mouth daily (Patient not taking: Reported on 11/2/2022)       methocarbamol (ROBAXIN) 750 MG tablet Take 1 tablet (750 mg) by mouth every 6 hours as needed for muscle spasms (Patient not taking: Reported on 11/2/2022) 45 tablet 1     Multiple Vitamins-Minerals (MULTIVITAMIN ADULTS PO) Take 1 tablet by mouth daily (Patient not taking: Reported on 11/2/2022)       oxyCODONE (ROXICODONE) 5 MG tablet Take 1 tablet (5 mg) by mouth every 4 hours as needed for breakthrough pain or severe pain (Patient not taking: Reported on 11/2/2022) 20 tablet 0     POTASSIUM PO Take 1 tablet by mouth daily (Patient not taking: Reported on 11/2/2022)         Allergies   Allergen Reactions     Gadolinium-Containing Contrast Media [Gadolinium Derivatives] Anaphylaxis and Itching     Diatrizoate Meglumine [Diatrizoate] Itching     5 minutes post CT injection, patient noted her eyes and ears were extremely itchy     Esomeprazole Sodium [Esomeprazole] Unknown     Swelling     Gluten [Gluten Meal] Itching     Hydrocodone-Acetaminophen Unknown     Hyperactivity      Latex Unknown     Omeprazole Unknown     Swelling- ankles        Social History     Tobacco Use     Smoking status: Every Day     Packs/day:  "0.50     Years: 25.00     Pack years: 12.50     Types: Cigarettes     Smokeless tobacco: Never     Tobacco comments:     Have stopped off and on   Substance Use Topics     Alcohol use: Yes     Comment: 10 drinks per week     Family History   Problem Relation Age of Onset     Colon Cancer Father      Hypertension Father      Heart Disease Other      Cerebrovascular Disease Other      History   Drug Use Unknown         Objective     /89 (BP Location: Left arm, Patient Position: Sitting, Cuff Size: Adult Regular)   Pulse 77   Ht 1.721 m (5' 7.75\")   Wt 108.9 kg (240 lb 2 oz)   LMP  (LMP Unknown)   BMI 36.78 kg/m      Physical Exam    GENERAL APPEARANCE: healthy, alert and no distress     EYES: EOMI, PERRL     HENT: ear canals and TM's normal and nose and mouth without ulcers or lesions     NECK: no adenopathy, no asymmetry, masses, or scars and thyroid normal to palpation     RESP: lungs clear to auscultation - no rales, rhonchi or wheezes     CV: regular rates and rhythm, normal S1 S2, no S3 or S4 and no murmur, click or rub     ABDOMEN:  soft, nontender, no HSM or masses and bowel sounds normal     MS: extremities normal- no gross deformities noted, no evidence of inflammation in joints, FROM in all extremities.     SKIN: no suspicious lesions or rashes     NEURO: Normal strength and tone, sensory exam grossly normal, mentation intact and speech normal     PSYCH: mentation appears normal. and affect normal/bright     LYMPHATICS: No cervical adenopathy    Recent Labs   Lab Test 05/17/22  1105 10/19/21  1129 07/29/21  1654   HGB 16.1* 14.9  --     248  --    INR 1.03  --   --      --  140   POTASSIUM 4.5  --  4.1   CR 0.84  --  0.86        Diagnostics:   Recent Results (from the past 24 hour(s))   Basic metabolic panel  (Ca, Cl, CO2, Creat, Gluc, K, Na, BUN)    Collection Time: 11/02/22  2:18 PM   Result Value Ref Range    Sodium 139 136 - 145 mmol/L    Potassium 4.1 3.4 - 5.3 mmol/L    Chloride " 105 98 - 107 mmol/L    Carbon Dioxide (CO2) 22 22 - 29 mmol/L    Anion Gap 12 7 - 15 mmol/L    Urea Nitrogen 32.9 (H) 8.0 - 23.0 mg/dL    Creatinine 0.89 0.51 - 0.95 mg/dL    Calcium 8.5 (L) 8.6 - 10.0 mg/dL    Glucose 97 70 - 99 mg/dL    GFR Estimate 74 >60 mL/min/1.73m2   CBC with platelets    Collection Time: 11/02/22  2:18 PM   Result Value Ref Range    WBC Count 6.4 4.0 - 11.0 10e3/uL    RBC Count 4.72 3.80 - 5.20 10e6/uL    Hemoglobin 15.0 11.7 - 15.7 g/dL    Hematocrit 44.0 35.0 - 47.0 %    MCV 93 78 - 100 fL    MCH 31.8 26.5 - 33.0 pg    MCHC 34.1 31.5 - 36.5 g/dL    RDW 12.9 10.0 - 15.0 %    Platelet Count 229 150 - 450 10e3/uL        No EKG required, no history of coronary heart disease, significant arrhythmia, peripheral arterial disease or other structural heart disease.    Revised Cardiac Risk Index (RCRI):  The patient has the following serious cardiovascular risks for perioperative complications:   - No serious cardiac risks = 0 points     RCRI Interpretation: 0 points: Class I (very low risk - 0.4% complication rate)           Signed Electronically by: Maru Emerson DO  Copy of this evaluation report is provided to requesting physician.

## 2022-11-02 NOTE — PROGRESS NOTES
[facility]  [address] [facility] [address]  Phone: [phone]  Fax: [phone]  Primary Provider: Ridge Emerson  Pre-op Performing Provider: RIDGE EMERSON      PREOPERATIVE EVALUATION:  Today's date: 11/2/2022    Brenna Fournier is a 57 year old female who presents for a preoperative evaluation.    Surgical Information:  Surgery/Procedure: Lumbar 3-4 decompression  Surgery Location: [facility]  Surgeon: Dr. Holley  Surgery Date: 11/22/22  Time of Surgery: 10 am  Where patient plans to recover: At home with family  Fax number for surgical facility: Note does not need to be faxed, will be available electronically in Epic.    Type of Anesthesia Anticipated: General    Assessment & Plan     The proposed surgical procedure is considered INTERMEDIATE risk.    1. Preop general physical exam  2. Spinal stenosis of lumbar region with neurogenic claudication  - Basic metabolic panel  (Ca, Cl, CO2, Creat, Gluc, K, Na, BUN); Future  - CBC with platelets; Future    Risks and Recommendations:  The patient has the following additional risks and recommendations for perioperative complications:   - No identified additional risk factors other than previously addressed    Medication Instructions:  Patient is on no chronic medications    RECOMMENDATION:  APPROVAL GIVEN to proceed with proposed procedure, without further diagnostic evaluation.    3. Encounter for tobacco use cessation counseling  - nicotine (NICODERM CQ) 14 MG/24HR 24 hr patch; Place 1 patch onto the skin every 24 hours for 42 days  Dispense: 42 patch; Refill: 0  - nicotine (NICODERM CQ) 7 MG/24HR 24 hr patch; Place 1 patch onto the skin every 24 hours  Dispense: 14 patch; Refill: 0    Still smoking, would like new prescription for nicotine patches.  Prescription sent to pharmacy.    4. Immunization due  - INFLUENZA QUAD, RECOMBINANT, P-FREE (RIV4) (FLUBLOK) AGE 50-64 [GRV824]  - COVID-19,PF,PFIZER  BOOSTER BIVALENT (12+YRS)     Will check with insurance on shingles shot coverage.  Defer pneumococcal vaccine today as we are administering 2 other vaccines.    Subjective     HPI related to upcoming procedure:     LUMBAR DECOMPRESSION: Right foot feeling cold, otherwise is doing okay. Did start with recurrent pain. Covid test scheduled 11/18/22.     HEALTH MAINTENANCE:   - Flu: will do today    - Covid: will do today    - Shingles: will check with insurance    - PCV: defer today      Preop Questions 10/26/2022   1. Have you ever had a heart attack or stroke? No   2. Have you ever had surgery on your heart or blood vessels, such as a stent placement, a coronary artery bypass, or surgery on an artery in your head, neck, heart, or legs? No   3. Do you have chest pain with activity? No   4. Do you have a history of  heart failure? No   5. Do you currently have a cold, bronchitis or symptoms of other infection? No   6. Do you have a cough, shortness of breath, or wheezing? No   7. Do you or anyone in your family have previous history of blood clots? No   8. Do you or does anyone in your family have a serious bleeding problem such as prolonged bleeding following surgeries or cuts? No   9. Have you ever had problems with anemia or been told to take iron pills? No   10. Have you had any abnormal blood loss such as black, tarry or bloody stools, or abnormal vaginal bleeding? No   11. Have you ever had a blood transfusion? No   12. Are you willing to have a blood transfusion if it is medically needed before, during, or after your surgery? Yes   13. Have you or any of your relatives ever had problems with anesthesia? No   14. Do you have sleep apnea, excessive snoring or daytime drowsiness? No   15. Do you have any artifical heart valves or other implanted medical devices like a pacemaker, defibrillator, or continuous glucose monitor? No   16. Do you have artificial joints? YES - right knee    17. Are you allergic to latex?  YES: blisters    18. Is there any chance that you may be pregnant? No       Health Care Directive:  Patient does not have a Health Care Directive or Living Will: Patient states has Advance Directive and will bring in a copy to clinic.    Preoperative Review of :   reviewed - controlled substances reflected in medication list.    Status of Chronic Conditions:  See problem list for active medical problems.  Problems all longstanding and stable, except as noted/documented.  See ROS for pertinent symptoms related to these conditions.      Review of Systems  Constitutional, neuro, ENT, endocrine, pulmonary, cardiac, gastrointestinal, genitourinary, musculoskeletal, integument and psychiatric systems are negative, except as otherwise noted.    Patient Active Problem List    Diagnosis Date Noted     Spinal stenosis of lumbar region with neurogenic claudication 05/19/2022     Priority: Medium     Tobacco dependence 05/19/2022     Priority: Medium     Low calcium levels 07/06/2021     Priority: Medium     Hiatal hernia 07/06/2021     Priority: Medium     Irritable bowel syndrome with both constipation and diarrhea 10/10/2020     Priority: Medium     Benign neoplasm of ascending colon 07/27/2020     Priority: Medium     History of colonic polyps 07/23/2020     Priority: Medium     Thyroid Diffuse Enlargement      Priority: Medium     Nontoxic Solitary Thyroid Nodule      Priority: Medium     Joint Pain, Localized In The Knee      Priority: Medium     Obesity      Priority: Medium      Past Medical History:   Diagnosis Date     Cystic acne      Elevated blood pressure reading without diagnosis of hypertension 09/25/2019     Jaw pain      Localized pain of knee joint      Obesity      Past Surgical History:   Procedure Laterality Date     COLONOSCOPY  6/2020     DECOMPRESSION LUMBAR TWO LEVELS N/A 5/24/2022    Procedure: Lumbar 2-Lumbar 3, Lumbar 3-Lumbar 4, Lumbar Decompression;  Surgeon: Roel Holley MD;   Location:  OR      KNEE SCOPE, DIAGNOSTIC      Description: Arthroscopy Knee Right;  Recorded: 11/21/2012;     HC SHLDR ARTHROSCOP,SURG,W/REMOVAL,LOOSE/FB      Description: Shoulder Arthroscopy;  Recorded: 11/21/2012;     JOINT REPLACEMENT       THYROIDECTOMY, PARTIAL Left 12/17/2019    Procedure: LEFT THYROID LOBECTOMY;  Surgeon: Santosh Doe MD;  Location: [facility];  Service: ENT     TRANSPLANT  2013    Knee Replacement     Current Outpatient Medications   Medication Sig Dispense Refill     Cyanocobalamin (B-12 PO) Take 1 tablet by mouth daily (Patient not taking: Reported on 11/2/2022)       gabapentin (NEURONTIN) 100 MG capsule Take 1 capsule (100 mg) by mouth 3 times daily 90 capsule 1     MAGNESIUM PO Take 1 tablet by mouth daily (Patient not taking: Reported on 11/2/2022)       methocarbamol (ROBAXIN) 750 MG tablet Take 1 tablet (750 mg) by mouth every 6 hours as needed for muscle spasms (Patient not taking: Reported on 11/2/2022) 45 tablet 1     Multiple Vitamins-Minerals (MULTIVITAMIN ADULTS PO) Take 1 tablet by mouth daily (Patient not taking: Reported on 11/2/2022)       oxyCODONE (ROXICODONE) 5 MG tablet Take 1 tablet (5 mg) by mouth every 4 hours as needed for breakthrough pain or severe pain (Patient not taking: Reported on 11/2/2022) 20 tablet 0     POTASSIUM PO Take 1 tablet by mouth daily (Patient not taking: Reported on 11/2/2022)         Allergies   Allergen Reactions     Gadolinium-Containing Contrast Media [Gadolinium Derivatives] Anaphylaxis and Itching     Diatrizoate Meglumine [Diatrizoate] Itching     5 minutes post CT injection, patient noted her eyes and ears were extremely itchy     Esomeprazole Sodium [Esomeprazole] Unknown     Swelling     Gluten [Gluten Meal] Itching     Hydrocodone-Acetaminophen Unknown     Hyperactivity      Latex Unknown     Omeprazole Unknown     Swelling- ankles        Social History     Tobacco Use     Smoking status: Every Day     Packs/day:  "0.50     Years: 25.00     Pack years: 12.50     Types: Cigarettes     Smokeless tobacco: Never     Tobacco comments:     Have stopped off and on   Substance Use Topics     Alcohol use: Yes     Comment: 10 drinks per week     Family History   Problem Relation Age of Onset     Colon Cancer Father      Hypertension Father      Heart Disease Other      Cerebrovascular Disease Other      History   Drug Use Unknown         Objective     /89 (BP Location: Left arm, Patient Position: Sitting, Cuff Size: Adult Regular)   Pulse 77   Ht 1.721 m (5' 7.75\")   Wt 108.9 kg (240 lb 2 oz)   LMP  (LMP Unknown)   BMI 36.78 kg/m      Physical Exam    GENERAL APPEARANCE: healthy, alert and no distress     EYES: EOMI, PERRL     HENT: ear canals and TM's normal and nose and mouth without ulcers or lesions     NECK: no adenopathy, no asymmetry, masses, or scars and thyroid normal to palpation     RESP: lungs clear to auscultation - no rales, rhonchi or wheezes     CV: regular rates and rhythm, normal S1 S2, no S3 or S4 and no murmur, click or rub     ABDOMEN:  soft, nontender, no HSM or masses and bowel sounds normal     MS: extremities normal- no gross deformities noted, no evidence of inflammation in joints, FROM in all extremities.     SKIN: no suspicious lesions or rashes     NEURO: Normal strength and tone, sensory exam grossly normal, mentation intact and speech normal     PSYCH: mentation appears normal. and affect normal/bright     LYMPHATICS: No cervical adenopathy    Recent Labs   Lab Test 05/17/22  1105 10/19/21  1129 07/29/21  1654   HGB 16.1* 14.9  --     248  --    INR 1.03  --   --      --  140   POTASSIUM 4.5  --  4.1   CR 0.84  --  0.86        Diagnostics:   Recent Results (from the past 24 hour(s))   Basic metabolic panel  (Ca, Cl, CO2, Creat, Gluc, K, Na, BUN)    Collection Time: 11/02/22  2:18 PM   Result Value Ref Range    Sodium 139 136 - 145 mmol/L    Potassium 4.1 3.4 - 5.3 mmol/L    Chloride " 105 98 - 107 mmol/L    Carbon Dioxide (CO2) 22 22 - 29 mmol/L    Anion Gap 12 7 - 15 mmol/L    Urea Nitrogen 32.9 (H) 8.0 - 23.0 mg/dL    Creatinine 0.89 0.51 - 0.95 mg/dL    Calcium 8.5 (L) 8.6 - 10.0 mg/dL    Glucose 97 70 - 99 mg/dL    GFR Estimate 74 >60 mL/min/1.73m2   CBC with platelets    Collection Time: 11/02/22  2:18 PM   Result Value Ref Range    WBC Count 6.4 4.0 - 11.0 10e3/uL    RBC Count 4.72 3.80 - 5.20 10e6/uL    Hemoglobin 15.0 11.7 - 15.7 g/dL    Hematocrit 44.0 35.0 - 47.0 %    MCV 93 78 - 100 fL    MCH 31.8 26.5 - 33.0 pg    MCHC 34.1 31.5 - 36.5 g/dL    RDW 12.9 10.0 - 15.0 %    Platelet Count 229 150 - 450 10e3/uL        No EKG required, no history of coronary heart disease, significant arrhythmia, peripheral arterial disease or other structural heart disease.    Revised Cardiac Risk Index (RCRI):  The patient has the following serious cardiovascular risks for perioperative complications:   - No serious cardiac risks = 0 points     RCRI Interpretation: 0 points: Class I (very low risk - 0.4% complication rate)           Signed Electronically by: Maru Emerson DO  Copy of this evaluation report is provided to requesting physician.

## 2022-11-02 NOTE — PATIENT INSTRUCTIONS
Preparing for Your Surgery  Getting started  A nurse will call you to review your health history and instructions. They will give you an arrival time based on your scheduled surgery time. Please be ready to share:    Your doctor s clinic name and phone number    Your medical, surgical, and anesthesia history    A list of allergies and sensitivities    A list of medicines, including herbal treatments and over-the-counter drugs    Whether the patient has a legal guardian (ask how to send us the papers in advance)  Please tell us if you re pregnant--or if there s any chance you might be pregnant. Some surgeries may injure a fetus (unborn baby), so they require a pregnancy test. Surgeries that are safe for a fetus don t always need a test, and you can choose whether to have one.   If you have a child who s having surgery, please ask for a copy of Preparing for Your Child s Surgery.    Preparing for surgery    Within 10 to 30 days of surgery: Have a pre-op exam (sometimes called an H&P, or History and Physical). This can be done at a clinic or pre-operative center.  ? If you re having a , you may not need this exam. Talk to your care team.    At your pre-op exam, talk to your care team about all medicines you take. If you need to stop any medicines before surgery, ask when to start taking them again.  ? We do this for your safety. Many medicines can make you bleed too much during surgery. Some change how well surgery (anesthesia) drugs work.    Call your insurance company to let them know you re having surgery. (If you don t have insurance, call [phone].)    Call your clinic if there s any change in your health. This includes signs of a cold or flu (sore throat, runny nose, cough, rash, fever). It also includes a scrape or scratch near the surgery site.    If you have questions on the day of surgery, call your hospital or surgery center.  COVID testing  You may need to be tested for COVID-19 before having  surgery. If so, we will give you instructions (or click here).  Eating and drinking guidelines  For your safety: Unless your surgeon tells you otherwise, follow the guidelines below.    Eat and drink as usual until 8 hours before you arrive for surgery. After that, no food or milk.    Drink clear liquids until 2 hours before you arrive. These are liquids you can see through, like water, Gatorade, and Propel Water. They also include plain black coffee and tea (no cream or milk), candy, and breath mints. You can spit out gum when you arrive.    If you drink alcohol: Stop drinking it the night before surgery.    If your care team tells you to take medicine on the morning of surgery, it s okay to take it with a sip of water.  Preventing infection    Shower or bathe the night before and morning of your surgery. Follow the instructions your clinic gave you. (If no instructions, use regular soap.)    Don t shave or clip hair near your surgery site. We ll remove the hair if needed.    Don t smoke or vape the morning of surgery. You may chew nicotine gum up to 2 hours before surgery. A nicotine patch is okay.  ? Note: Some surgeries require you to completely quit smoking and nicotine. Check with your surgeon.    Your care team will make every effort to keep you safe from infection. We will:  ? Clean our hands often with soap and water (or an alcohol-based hand rub).  ? Clean the skin at your surgery site with a special soap that kills germs.  ? Give you a special gown to keep you warm. (Cold raises the risk of infection.)  ? Wear special hair covers, masks, gowns and gloves during surgery.  ? Give antibiotic medicine, if prescribed. Not all surgeries need antibiotics.  What to bring on the day of surgery    Photo ID and insurance card    Copy of your health care directive, if you have one    Glasses and hearing aids (bring cases)  ? You can t wear contacts during surgery    Inhaler and eye drops, if you use them (tell us  about these when you arrive)    CPAP machine or breathing device, if you use them    A few personal items, if spending the night    If you have . . .  ? A pacemaker, ICD (cardiac defibrillator) or other implant: Bring the ID card.  ? An implanted stimulator: Bring the remote control.  ? A legal guardian: Bring a copy of the certified (court-stamped) guardianship papers.  Please remove any jewelry, including body piercings. Leave jewelry and other valuables at home.  If you re going home the day of surgery    You must have a responsible adult drive you home. They should stay with you overnight as well.    If you don t have someone to stay with you, and you aren t safe to go home alone, we may keep you overnight. Insurance often won t pay for this.  After surgery  If it s hard to control your pain or you need more pain medicine, please call your surgeon s office.  Questions?   If you have any questions for your care team, list them here:   ____________________________________________________________________________________________________________________________________________________________________________________________________________________________________________________________________  For informational purposes only. Not to replace the advice of your health care provider. Copyright   2003, 2019 [employer]. All rights reserved. Clinically reviewed by Irasema Ayers MD. Emailage [id number] - REV 10/22.    Before Your Procedure or Hospital Admission  Testing for COVID-19  Thank you for choosing [facility] for your health care needs. Our goal is to keep you and our team here at [facility] safe and healthy. We've taken many steps to make this happen. For example:    We test and screen our staff, care teams and patients for COVID-19.    Everyone at [facility] must wear a mask.    We are limiting hospital and clinic visitors.  Before you come in, you must get tested for COVID-19,  even if you've been vaccinated.   What kind of COVID-19 test should I have?  At-home rapid antigen test    You must have a rapid-antigen test 1 to 2 days before your procedure (surgery) if you are:  ? Over age 2 and  ? Planning to go home the same day as your procedure (surgery)    Rapid antigen tests are not recommended for children age 2 and under. These patients should schedule a PCR test unless you have otherwise discussed performing an at-home antigen test with your surgeon.    You can buy this test at many pharmacy stores. Or, you may order a free test at covid.gov/tests.    If your test is negative: please take a photo of the test. Show the nurse the photo when you come in for your procedure. We can't accept rapid antigen tests that are more than 2 days old.  PCR lab test     You must have a PCR test in a lab within 4 days of your procedure (surgery) if you are:  ? Age 2 or under (unless your surgeon gives you different instructions).  ? Planning to stay overnight in the hospital  ? Unable to find an at-home test.    While you don't have to use our lab, we recommend it. You can get your results more quickly and easily this way. To schedule a test at an  [facility] lab, please call [phone]. Or, visit [email or web address].    If you have your test somewhere else, ask the testing location to fax your results to [phone].  ? If we don't get your results within 4 days of your procedure (surgery), we may have to delay or cancel your treatment.  ? We can't accept PCR tests that are more than 4 days old.  If your test shows you have COVID-19  If your test is positive, tell your surgeon's office right away. A positive test means that you have COVID-19.  We'll probably have to postpone your admission, surgery or procedure. Your care team will discuss this with you. We'll let you know what to do and when you can reschedule.  If your test shows you DON'T have COVID-19  A negative test  "result means you don't have COVID-19, but it is still possible that you might get it. It's rare, but sometimes the test result is wrong. Or, you could catch the virus after taking the test. There is also a very small chance that you could catch COVID-19 in the hospital or surgery center. [facility] has taken many steps to prevent this from happening.   To reduce your risk of getting COVID-19 before your procedure (surgery), follow the precautions below.  COVID-19 precautions  After your test and before your procedure, please follow these safety guidelines:    Limit trips outside your home.    Limit the number of people you see.    Always wear a face covering outside your home.    Use social distancing. Stay 6 feet away from others whenever you can.    Wash your hands often.  Possible surgery delay   Like you, we want your surgery to happen when it's scheduled. But sometimes the hospital is so full that it's not safe for you to have your surgery. This is especially true during the pandemic. Your surgery may need to be rescheduled to a later date. If this happens, we will call and tell you.   Day of your surgery or procedure    Please come wearing a face covering that covers both your nose and mouth.    When you arrive, we'll ask you some questions to find out if you've had any exposures to COVID-19 or have any signs of COVID-19.    Ask your care team if you can have visitors. All visitors must wear face coverings and will be screened for exposure to, or signs of, COVID-19.  ? The rules for visitors change often, depending on how much the virus is spreading. To learn more, see \"Visiting a Loved One in the Hospital during the COVID-19 Outbreak,\" at: www.[employer]/703626.pdf.   Please call your care team, hospital or surgery center if you have any questions. We thank you for your understanding and for choosing [facility] for your care.     For informational purposes only. Not to replace the advice of " your health care provider. Copyright   2020 [facility]. All rights reserved. Clinically reviewed by Infection Prevention and the [facility] COVID-19 Clinical Team. [employer] [id number] - Rev 08/01/22.    How to Take Your Medication Before Surgery  - HOLD (do not take) NSAIDS (ibuprofen) for 7 days before surgery

## 2022-11-03 NOTE — RESULT ENCOUNTER NOTE
Patient updated by [employer] message with lab results.     Ana Lilia Ceja,  Your lab results have returned and are within acceptable range. We will sign off on your preop exam.  Maru Emerson, DO

## 2022-11-18 ENCOUNTER — LAB (OUTPATIENT)
Dept: LAB | Facility: CLINIC | Age: 57
End: 2022-11-18
Attending: ORTHOPAEDIC SURGERY
Payer: COMMERCIAL

## 2022-11-18 DIAGNOSIS — Z01.812 PRE-OPERATIVE LABORATORY EXAMINATION: ICD-10-CM

## 2022-11-18 LAB — SARS-COV-2 RNA RESP QL NAA+PROBE: NEGATIVE

## 2022-11-18 PROCEDURE — U0003 INFECTIOUS AGENT DETECTION BY NUCLEIC ACID (DNA OR RNA); SEVERE ACUTE RESPIRATORY SYNDROME CORONAVIRUS 2 (SARS-COV-2) (CORONAVIRUS DISEASE [COVID-19]), AMPLIFIED PROBE TECHNIQUE, MAKING USE OF HIGH THROUGHPUT TECHNOLOGIES AS DESCRIBED BY CMS-2020-01-R: HCPCS

## 2022-11-18 PROCEDURE — U0005 INFEC AGEN DETEC AMPLI PROBE: HCPCS

## 2022-11-22 ENCOUNTER — ANESTHESIA (OUTPATIENT)
Dept: SURGERY | Facility: CLINIC | Age: 57
End: 2022-11-22
Payer: COMMERCIAL

## 2022-11-22 ENCOUNTER — ANESTHESIA EVENT (OUTPATIENT)
Dept: SURGERY | Facility: CLINIC | Age: 57
End: 2022-11-22
Payer: COMMERCIAL

## 2022-11-22 ENCOUNTER — HOSPITAL ENCOUNTER (OUTPATIENT)
Facility: CLINIC | Age: 57
Discharge: HOME | End: 2022-11-23
Attending: ORTHOPAEDIC SURGERY | Admitting: ORTHOPAEDIC SURGERY
Payer: COMMERCIAL

## 2022-11-22 DIAGNOSIS — G89.18 ACUTE POST-OPERATIVE PAIN: ICD-10-CM

## 2022-11-22 DIAGNOSIS — M48.062 SPINAL STENOSIS OF LUMBAR REGION WITH NEUROGENIC CLAUDICATION: Primary | ICD-10-CM

## 2022-11-22 PROCEDURE — 250N000011 HC RX IP 250 OP 636: Performed by: ORTHOPAEDIC SURGERY

## 2022-11-22 PROCEDURE — 258N000003 HC RX IP 258 OP 636: Performed by: NURSE ANESTHETIST, CERTIFIED REGISTERED

## 2022-11-22 PROCEDURE — 250N000011 HC RX IP 250 OP 636: Performed by: NURSE ANESTHETIST, CERTIFIED REGISTERED

## 2022-11-22 PROCEDURE — 370N000017 HC ANESTHESIA TECHNICAL FEE, PER MIN: Performed by: ORTHOPAEDIC SURGERY

## 2022-11-22 PROCEDURE — 250N000011 HC RX IP 250 OP 636: Performed by: HOSPITALIST

## 2022-11-22 PROCEDURE — 250N000011 HC RX IP 250 OP 636: Performed by: ANESTHESIOLOGY

## 2022-11-22 PROCEDURE — 250N000009 HC RX 250: Performed by: ORTHOPAEDIC SURGERY

## 2022-11-22 PROCEDURE — 250N000009 HC RX 250: Performed by: NURSE ANESTHETIST, CERTIFIED REGISTERED

## 2022-11-22 PROCEDURE — 250N000005 HC OR RX SURGIFLO HEMOSTATIC MATRIX 10ML 199102S OPNP: Performed by: ORTHOPAEDIC SURGERY

## 2022-11-22 PROCEDURE — 360N000084 HC SURGERY LEVEL 4 W/ FLUORO, PER MIN: Performed by: ORTHOPAEDIC SURGERY

## 2022-11-22 PROCEDURE — 250N000013 HC RX MED GY IP 250 OP 250 PS 637: Performed by: ORTHOPAEDIC SURGERY

## 2022-11-22 PROCEDURE — 999N000141 HC STATISTIC PRE-PROCEDURE NURSING ASSESSMENT: Performed by: ORTHOPAEDIC SURGERY

## 2022-11-22 PROCEDURE — 272N000004 HC RX 272: Performed by: ORTHOPAEDIC SURGERY

## 2022-11-22 PROCEDURE — 710N000009 HC RECOVERY PHASE 1, LEVEL 1, PER MIN: Performed by: ORTHOPAEDIC SURGERY

## 2022-11-22 PROCEDURE — 272N000001 HC OR GENERAL SUPPLY STERILE: Performed by: ORTHOPAEDIC SURGERY

## 2022-11-22 PROCEDURE — 258N000003 HC RX IP 258 OP 636: Performed by: ANESTHESIOLOGY

## 2022-11-22 RX ORDER — OXYCODONE HYDROCHLORIDE 5 MG/1
5 TABLET ORAL EVERY 4 HOURS PRN
Status: DISCONTINUED | OUTPATIENT
Start: 2022-11-22 | End: 2022-11-23 | Stop reason: HOSPADM

## 2022-11-22 RX ORDER — SODIUM CHLORIDE, SODIUM LACTATE, POTASSIUM CHLORIDE, CALCIUM CHLORIDE 600; 310; 30; 20 MG/100ML; MG/100ML; MG/100ML; MG/100ML
INJECTION, SOLUTION INTRAVENOUS CONTINUOUS
Status: DISCONTINUED | OUTPATIENT
Start: 2022-11-22 | End: 2022-11-22 | Stop reason: HOSPADM

## 2022-11-22 RX ORDER — METOPROLOL TARTRATE 1 MG/ML
1-2 INJECTION, SOLUTION INTRAVENOUS EVERY 5 MIN PRN
Status: DISCONTINUED | OUTPATIENT
Start: 2022-11-22 | End: 2022-11-22 | Stop reason: HOSPADM

## 2022-11-22 RX ORDER — METHOCARBAMOL 750 MG/1
750 TABLET, FILM COATED ORAL EVERY 6 HOURS PRN
Status: DISCONTINUED | OUTPATIENT
Start: 2022-11-22 | End: 2022-11-23 | Stop reason: HOSPADM

## 2022-11-22 RX ORDER — OXYCODONE HYDROCHLORIDE 5 MG/1
10 TABLET ORAL EVERY 4 HOURS PRN
Status: DISCONTINUED | OUTPATIENT
Start: 2022-11-22 | End: 2022-11-23 | Stop reason: HOSPADM

## 2022-11-22 RX ORDER — AMOXICILLIN 250 MG
1 CAPSULE ORAL 2 TIMES DAILY
Status: DISCONTINUED | OUTPATIENT
Start: 2022-11-22 | End: 2022-11-23 | Stop reason: HOSPADM

## 2022-11-22 RX ORDER — FENTANYL CITRATE 50 UG/ML
25 INJECTION, SOLUTION INTRAMUSCULAR; INTRAVENOUS
Status: DISCONTINUED | OUTPATIENT
Start: 2022-11-22 | End: 2022-11-22 | Stop reason: HOSPADM

## 2022-11-22 RX ORDER — METHOCARBAMOL 750 MG/1
750 TABLET, FILM COATED ORAL EVERY 6 HOURS PRN
Qty: 60 TABLET | Refills: 0 | Status: SHIPPED | OUTPATIENT
Start: 2022-11-22 | End: 2023-07-28

## 2022-11-22 RX ORDER — LIDOCAINE HYDROCHLORIDE 10 MG/ML
INJECTION, SOLUTION EPIDURAL; INFILTRATION; INTRACAUDAL; PERINEURAL PRN
Status: DISCONTINUED | OUTPATIENT
Start: 2022-11-22 | End: 2022-11-22

## 2022-11-22 RX ORDER — BUPIVACAINE HYDROCHLORIDE AND EPINEPHRINE 2.5; 5 MG/ML; UG/ML
INJECTION, SOLUTION INFILTRATION; PERINEURAL PRN
Status: DISCONTINUED | OUTPATIENT
Start: 2022-11-22 | End: 2022-11-22 | Stop reason: HOSPADM

## 2022-11-22 RX ORDER — NALOXONE HYDROCHLORIDE 0.4 MG/ML
0.4 INJECTION, SOLUTION INTRAMUSCULAR; INTRAVENOUS; SUBCUTANEOUS
Status: DISCONTINUED | OUTPATIENT
Start: 2022-11-22 | End: 2022-11-23 | Stop reason: HOSPADM

## 2022-11-22 RX ORDER — GABAPENTIN 100 MG/1
100 CAPSULE ORAL 3 TIMES DAILY
Status: DISCONTINUED | OUTPATIENT
Start: 2022-11-23 | End: 2022-11-23 | Stop reason: HOSPADM

## 2022-11-22 RX ORDER — GABAPENTIN 100 MG/1
300 CAPSULE ORAL
Status: COMPLETED | OUTPATIENT
Start: 2022-11-22 | End: 2022-11-22

## 2022-11-22 RX ORDER — BISACODYL 10 MG
10 SUPPOSITORY, RECTAL RECTAL DAILY PRN
Status: DISCONTINUED | OUTPATIENT
Start: 2022-11-22 | End: 2022-11-23 | Stop reason: HOSPADM

## 2022-11-22 RX ORDER — ACETAMINOPHEN 325 MG/1
650 TABLET ORAL EVERY 4 HOURS PRN
Qty: 50 TABLET | Refills: 0 | Status: SHIPPED | OUTPATIENT
Start: 2022-11-22 | End: 2023-07-28

## 2022-11-22 RX ORDER — PROPOFOL 10 MG/ML
INJECTION, EMULSION INTRAVENOUS PRN
Status: DISCONTINUED | OUTPATIENT
Start: 2022-11-22 | End: 2022-11-22

## 2022-11-22 RX ORDER — MEPERIDINE HYDROCHLORIDE 25 MG/ML
12.5 INJECTION INTRAMUSCULAR; INTRAVENOUS; SUBCUTANEOUS
Status: DISCONTINUED | OUTPATIENT
Start: 2022-11-22 | End: 2022-11-22 | Stop reason: HOSPADM

## 2022-11-22 RX ORDER — PROPOFOL 10 MG/ML
INJECTION, EMULSION INTRAVENOUS CONTINUOUS PRN
Status: DISCONTINUED | OUTPATIENT
Start: 2022-11-22 | End: 2022-11-22

## 2022-11-22 RX ORDER — DEXAMETHASONE SODIUM PHOSPHATE 4 MG/ML
4 INJECTION, SOLUTION INTRA-ARTICULAR; INTRALESIONAL; INTRAMUSCULAR; INTRAVENOUS; SOFT TISSUE ONCE
Status: COMPLETED | OUTPATIENT
Start: 2022-11-22 | End: 2022-11-22

## 2022-11-22 RX ORDER — CEFAZOLIN SODIUM/WATER 2 G/20 ML
2 SYRINGE (ML) INTRAVENOUS SEE ADMIN INSTRUCTIONS
Status: DISCONTINUED | OUTPATIENT
Start: 2022-11-22 | End: 2022-11-22 | Stop reason: HOSPADM

## 2022-11-22 RX ORDER — NEOSTIGMINE METHYLSULFATE 1 MG/ML
VIAL (ML) INJECTION PRN
Status: DISCONTINUED | OUTPATIENT
Start: 2022-11-22 | End: 2022-11-22

## 2022-11-22 RX ORDER — HYDROMORPHONE HCL IN WATER/PF 6 MG/30 ML
0.2 PATIENT CONTROLLED ANALGESIA SYRINGE INTRAVENOUS
Status: DISCONTINUED | OUTPATIENT
Start: 2022-11-22 | End: 2022-11-23 | Stop reason: HOSPADM

## 2022-11-22 RX ORDER — HYDROXYZINE HYDROCHLORIDE 25 MG/1
25 TABLET, FILM COATED ORAL EVERY 6 HOURS PRN
Qty: 30 TABLET | Refills: 0 | Status: SHIPPED | OUTPATIENT
Start: 2022-11-22 | End: 2023-07-28

## 2022-11-22 RX ORDER — AMOXICILLIN 250 MG
1 CAPSULE ORAL DAILY
Qty: 30 TABLET | Refills: 0 | Status: SHIPPED | OUTPATIENT
Start: 2022-11-22 | End: 2023-07-28

## 2022-11-22 RX ORDER — ACETAMINOPHEN 325 MG/1
975 TABLET ORAL EVERY 8 HOURS
Status: DISCONTINUED | OUTPATIENT
Start: 2022-11-22 | End: 2022-11-23 | Stop reason: HOSPADM

## 2022-11-22 RX ORDER — NALOXONE HYDROCHLORIDE 0.4 MG/ML
0.2 INJECTION, SOLUTION INTRAMUSCULAR; INTRAVENOUS; SUBCUTANEOUS
Status: DISCONTINUED | OUTPATIENT
Start: 2022-11-22 | End: 2022-11-23 | Stop reason: HOSPADM

## 2022-11-22 RX ORDER — CEFAZOLIN SODIUM/WATER 2 G/20 ML
2 SYRINGE (ML) INTRAVENOUS
Status: COMPLETED | OUTPATIENT
Start: 2022-11-22 | End: 2022-11-22

## 2022-11-22 RX ORDER — DIPHENHYDRAMINE HCL 25 MG
25 CAPSULE ORAL EVERY 6 HOURS PRN
Status: DISCONTINUED | OUTPATIENT
Start: 2022-11-22 | End: 2022-11-23 | Stop reason: HOSPADM

## 2022-11-22 RX ORDER — GLYCOPYRROLATE 0.2 MG/ML
INJECTION, SOLUTION INTRAMUSCULAR; INTRAVENOUS PRN
Status: DISCONTINUED | OUTPATIENT
Start: 2022-11-22 | End: 2022-11-22

## 2022-11-22 RX ORDER — ONDANSETRON 4 MG/1
4 TABLET, ORALLY DISINTEGRATING ORAL EVERY 30 MIN PRN
Status: DISCONTINUED | OUTPATIENT
Start: 2022-11-22 | End: 2022-11-22 | Stop reason: HOSPADM

## 2022-11-22 RX ORDER — HYDROMORPHONE HCL IN WATER/PF 6 MG/30 ML
0.2 PATIENT CONTROLLED ANALGESIA SYRINGE INTRAVENOUS EVERY 5 MIN PRN
Status: DISCONTINUED | OUTPATIENT
Start: 2022-11-22 | End: 2022-11-22 | Stop reason: HOSPADM

## 2022-11-22 RX ORDER — FAMOTIDINE 20 MG/1
20 TABLET, FILM COATED ORAL 2 TIMES DAILY
Status: DISCONTINUED | OUTPATIENT
Start: 2022-11-22 | End: 2022-11-23 | Stop reason: HOSPADM

## 2022-11-22 RX ORDER — HYDROMORPHONE HCL IN WATER/PF 6 MG/30 ML
0.4 PATIENT CONTROLLED ANALGESIA SYRINGE INTRAVENOUS EVERY 5 MIN PRN
Status: DISCONTINUED | OUTPATIENT
Start: 2022-11-22 | End: 2022-11-22 | Stop reason: HOSPADM

## 2022-11-22 RX ORDER — OXYCODONE HYDROCHLORIDE 5 MG/1
5-10 TABLET ORAL EVERY 4 HOURS PRN
Qty: 20 TABLET | Refills: 0 | Status: SHIPPED | OUTPATIENT
Start: 2022-11-22 | End: 2023-07-28

## 2022-11-22 RX ORDER — GABAPENTIN 300 MG/1
CAPSULE ORAL
Qty: 45 CAPSULE | Refills: 0 | Status: SHIPPED | OUTPATIENT
Start: 2022-11-22 | End: 2023-07-28

## 2022-11-22 RX ORDER — ONDANSETRON 2 MG/ML
4 INJECTION INTRAMUSCULAR; INTRAVENOUS EVERY 30 MIN PRN
Status: DISCONTINUED | OUTPATIENT
Start: 2022-11-22 | End: 2022-11-22 | Stop reason: HOSPADM

## 2022-11-22 RX ORDER — DEXAMETHASONE SODIUM PHOSPHATE 4 MG/ML
INJECTION, SOLUTION INTRA-ARTICULAR; INTRALESIONAL; INTRAMUSCULAR; INTRAVENOUS; SOFT TISSUE PRN
Status: DISCONTINUED | OUTPATIENT
Start: 2022-11-22 | End: 2022-11-22

## 2022-11-22 RX ORDER — LIDOCAINE 40 MG/G
CREAM TOPICAL
Status: DISCONTINUED | OUTPATIENT
Start: 2022-11-22 | End: 2022-11-23 | Stop reason: HOSPADM

## 2022-11-22 RX ORDER — HYDROMORPHONE HCL IN WATER/PF 6 MG/30 ML
0.4 PATIENT CONTROLLED ANALGESIA SYRINGE INTRAVENOUS
Status: DISCONTINUED | OUTPATIENT
Start: 2022-11-22 | End: 2022-11-23 | Stop reason: HOSPADM

## 2022-11-22 RX ORDER — POLYETHYLENE GLYCOL 3350 17 G/17G
17 POWDER, FOR SOLUTION ORAL DAILY
Status: DISCONTINUED | OUTPATIENT
Start: 2022-11-23 | End: 2022-11-23 | Stop reason: HOSPADM

## 2022-11-22 RX ORDER — ONDANSETRON 2 MG/ML
INJECTION INTRAMUSCULAR; INTRAVENOUS PRN
Status: DISCONTINUED | OUTPATIENT
Start: 2022-11-22 | End: 2022-11-22

## 2022-11-22 RX ORDER — ACETAMINOPHEN 325 MG/1
650 TABLET ORAL EVERY 4 HOURS PRN
Status: DISCONTINUED | OUTPATIENT
Start: 2022-11-25 | End: 2022-11-23 | Stop reason: HOSPADM

## 2022-11-22 RX ORDER — FENTANYL CITRATE 50 UG/ML
50 INJECTION, SOLUTION INTRAMUSCULAR; INTRAVENOUS EVERY 5 MIN PRN
Status: DISCONTINUED | OUTPATIENT
Start: 2022-11-22 | End: 2022-11-22 | Stop reason: HOSPADM

## 2022-11-22 RX ORDER — FENTANYL CITRATE 50 UG/ML
25 INJECTION, SOLUTION INTRAMUSCULAR; INTRAVENOUS EVERY 5 MIN PRN
Status: DISCONTINUED | OUTPATIENT
Start: 2022-11-22 | End: 2022-11-22 | Stop reason: HOSPADM

## 2022-11-22 RX ORDER — FENTANYL CITRATE 50 UG/ML
INJECTION, SOLUTION INTRAMUSCULAR; INTRAVENOUS PRN
Status: DISCONTINUED | OUTPATIENT
Start: 2022-11-22 | End: 2022-11-22

## 2022-11-22 RX ORDER — MAGNESIUM HYDROXIDE 1200 MG/15ML
LIQUID ORAL PRN
Status: DISCONTINUED | OUTPATIENT
Start: 2022-11-22 | End: 2022-11-22 | Stop reason: HOSPADM

## 2022-11-22 RX ORDER — ONDANSETRON 2 MG/ML
4 INJECTION INTRAMUSCULAR; INTRAVENOUS EVERY 6 HOURS PRN
Status: DISCONTINUED | OUTPATIENT
Start: 2022-11-22 | End: 2022-11-23 | Stop reason: HOSPADM

## 2022-11-22 RX ORDER — VANCOMYCIN HYDROCHLORIDE 1 G/20ML
INJECTION, POWDER, LYOPHILIZED, FOR SOLUTION INTRAVENOUS PRN
Status: DISCONTINUED | OUTPATIENT
Start: 2022-11-22 | End: 2022-11-22 | Stop reason: HOSPADM

## 2022-11-22 RX ADMIN — OXYCODONE HYDROCHLORIDE 5 MG: 5 TABLET ORAL at 15:38

## 2022-11-22 RX ADMIN — ACETAMINOPHEN 975 MG: 325 TABLET, FILM COATED ORAL at 20:39

## 2022-11-22 RX ADMIN — DEXAMETHASONE SODIUM PHOSPHATE 8 MG: 4 INJECTION, SOLUTION INTRA-ARTICULAR; INTRALESIONAL; INTRAMUSCULAR; INTRAVENOUS; SOFT TISSUE at 12:24

## 2022-11-22 RX ADMIN — FENTANYL CITRATE 50 MCG: 50 INJECTION, SOLUTION INTRAMUSCULAR; INTRAVENOUS at 15:08

## 2022-11-22 RX ADMIN — HYDROMORPHONE HYDROCHLORIDE 0.5 MG: 1 INJECTION, SOLUTION INTRAMUSCULAR; INTRAVENOUS; SUBCUTANEOUS at 14:51

## 2022-11-22 RX ADMIN — SENNOSIDES AND DOCUSATE SODIUM 1 TABLET: 50; 8.6 TABLET ORAL at 20:40

## 2022-11-22 RX ADMIN — DEXAMETHASONE SODIUM PHOSPHATE 4 MG: 4 INJECTION, SOLUTION INTRAMUSCULAR; INTRAVENOUS at 18:54

## 2022-11-22 RX ADMIN — SODIUM CHLORIDE, POTASSIUM CHLORIDE, SODIUM LACTATE AND CALCIUM CHLORIDE: 600; 310; 30; 20 INJECTION, SOLUTION INTRAVENOUS at 14:02

## 2022-11-22 RX ADMIN — HYDROMORPHONE HYDROCHLORIDE 1 MG: 1 INJECTION, SOLUTION INTRAMUSCULAR; INTRAVENOUS; SUBCUTANEOUS at 12:38

## 2022-11-22 RX ADMIN — MIDAZOLAM 2 MG: 1 INJECTION INTRAMUSCULAR; INTRAVENOUS at 12:16

## 2022-11-22 RX ADMIN — Medication 2 G: at 12:14

## 2022-11-22 RX ADMIN — ONDANSETRON 4 MG: 2 INJECTION INTRAMUSCULAR; INTRAVENOUS at 20:32

## 2022-11-22 RX ADMIN — ROCURONIUM BROMIDE 20 MG: 50 INJECTION, SOLUTION INTRAVENOUS at 13:37

## 2022-11-22 RX ADMIN — SODIUM CHLORIDE, POTASSIUM CHLORIDE, SODIUM LACTATE AND CALCIUM CHLORIDE: 600; 310; 30; 20 INJECTION, SOLUTION INTRAVENOUS at 11:11

## 2022-11-22 RX ADMIN — PHENYLEPHRINE HYDROCHLORIDE 150 MCG: 10 INJECTION INTRAVENOUS at 13:37

## 2022-11-22 RX ADMIN — ROCURONIUM BROMIDE 40 MG: 50 INJECTION, SOLUTION INTRAVENOUS at 12:24

## 2022-11-22 RX ADMIN — FENTANYL CITRATE 100 MCG: 50 INJECTION, SOLUTION INTRAMUSCULAR; INTRAVENOUS at 12:23

## 2022-11-22 RX ADMIN — NEOSTIGMINE METHYLSULFATE 4 MG: 1 INJECTION, SOLUTION INTRAVENOUS at 14:28

## 2022-11-22 RX ADMIN — HYDROMORPHONE HYDROCHLORIDE 0.5 MG: 1 INJECTION, SOLUTION INTRAMUSCULAR; INTRAVENOUS; SUBCUTANEOUS at 14:02

## 2022-11-22 RX ADMIN — PHENYLEPHRINE HYDROCHLORIDE 50 MCG: 10 INJECTION INTRAVENOUS at 13:29

## 2022-11-22 RX ADMIN — ONDANSETRON HYDROCHLORIDE 4 MG: 2 INJECTION, SOLUTION INTRAVENOUS at 14:28

## 2022-11-22 RX ADMIN — PHENYLEPHRINE HYDROCHLORIDE 100 MCG: 10 INJECTION INTRAVENOUS at 13:23

## 2022-11-22 RX ADMIN — PROPOFOL 50 MCG/KG/MIN: 10 INJECTION, EMULSION INTRAVENOUS at 12:30

## 2022-11-22 RX ADMIN — GABAPENTIN 300 MG: 100 CAPSULE ORAL at 10:42

## 2022-11-22 RX ADMIN — GLYCOPYRROLATE 0.6 MG: 0.2 INJECTION, SOLUTION INTRAMUSCULAR; INTRAVENOUS at 14:28

## 2022-11-22 RX ADMIN — LIDOCAINE HYDROCHLORIDE 30 MG: 10 INJECTION, SOLUTION EPIDURAL; INFILTRATION; INTRACAUDAL; PERINEURAL at 12:23

## 2022-11-22 RX ADMIN — PHENYLEPHRINE HYDROCHLORIDE 100 MCG: 10 INJECTION INTRAVENOUS at 13:14

## 2022-11-22 RX ADMIN — HYDROMORPHONE HYDROCHLORIDE 0.2 MG: 0.2 INJECTION, SOLUTION INTRAMUSCULAR; INTRAVENOUS; SUBCUTANEOUS at 20:46

## 2022-11-22 RX ADMIN — OXYCODONE HYDROCHLORIDE 5 MG: 5 TABLET ORAL at 23:25

## 2022-11-22 RX ADMIN — GLYCOPYRROLATE 0.2 MG: 0.2 INJECTION, SOLUTION INTRAMUSCULAR; INTRAVENOUS at 12:24

## 2022-11-22 RX ADMIN — METHOCARBAMOL 750 MG: 750 TABLET ORAL at 16:19

## 2022-11-22 RX ADMIN — ROCURONIUM BROMIDE 10 MG: 50 INJECTION, SOLUTION INTRAVENOUS at 12:52

## 2022-11-22 RX ADMIN — PROPOFOL 200 MG: 10 INJECTION, EMULSION INTRAVENOUS at 12:23

## 2022-11-22 ASSESSMENT — COLUMBIA-SUICIDE SEVERITY RATING SCALE - C-SSRS
4. HAVE YOU HAD THESE THOUGHTS AND HAD SOME INTENTION OF ACTING ON THEM?: NO
1. IN THE PAST MONTH, HAVE YOU WISHED YOU WERE DEAD OR WISHED YOU COULD GO TO SLEEP AND NOT WAKE UP?: NO
3. HAVE YOU BEEN THINKING ABOUT HOW YOU MIGHT KILL YOURSELF?: NO
6. HAVE YOU EVER DONE ANYTHING, STARTED TO DO ANYTHING, OR PREPARED TO DO ANYTHING TO END YOUR LIFE?: NO
2. HAVE YOU ACTUALLY HAD ANY THOUGHTS OF KILLING YOURSELF IN THE PAST MONTH?: NO
5. HAVE YOU STARTED TO WORK OUT OR WORKED OUT THE DETAILS OF HOW TO KILL YOURSELF? DO YOU INTEND TO CARRY OUT THIS PLAN?: NO

## 2022-11-22 ASSESSMENT — ACTIVITIES OF DAILY LIVING (ADL)
ADLS_ACUITY_SCORE: 35
ADLS_ACUITY_SCORE: 35
ADLS_ACUITY_SCORE: 34
ADLS_ACUITY_SCORE: 31
ADLS_ACUITY_SCORE: 35
ADLS_ACUITY_SCORE: 31
ADLS_ACUITY_SCORE: 35

## 2022-11-22 ASSESSMENT — LIFESTYLE VARIABLES: TOBACCO_USE: 1

## 2022-11-22 NOTE — ANESTHESIA POSTPROCEDURE EVALUATION
Patient: Brenna Fournier    Procedure: Procedure(s):  Lumbar 3-4 lumbar decompression       Anesthesia Type:  General    Note:  Disposition: Inpatient   Postop Pain Control: Uneventful            Sign Out: Well controlled pain   PONV: No   Neuro/Psych: Uneventful            Sign Out: Acceptable/Baseline neuro status   Airway/Respiratory: Uneventful            Sign Out: Acceptable/Baseline resp. status   CV/Hemodynamics: Uneventful            Sign Out: Acceptable CV status; No obvious hypovolemia; No obvious fluid overload   Other NRE: NONE   DID A NON-ROUTINE EVENT OCCUR? No           Last vitals:  Vitals Value Taken Time   /79 11/22/22 1600   Temp 98.1  F (36.7  C) 11/22/22 1600   Pulse 74 11/22/22 1621   Resp 26 11/22/22 1622   SpO2 98 % 11/22/22 1632   Vitals shown include unvalidated device data.    Electronically Signed By: Tahmina Brennan MD  November 22, 2022  4:53 PM

## 2022-11-22 NOTE — PHARMACY-ADMISSION MEDICATION HISTORY
Medication history and patient interview completed by pharmacy intern/student or pre-admitting RN.  Reviewed by pharmacist, including [employer] dispense records, [facility] Care Everywhere, and chart review.       Rico Marti, Pharm.D., [facility]      Med List Status: Nurse Complete Set By: Betina Seals, RN at 11/16/2022 3:27 PM       Medications Prior to Admission   Medication Sig Dispense Refill Last Dose     Cyanocobalamin (B-12 PO) Take 1 tablet by mouth daily   Past Month     MAGNESIUM PO Take 1 tablet by mouth daily   Past Month     methocarbamol (ROBAXIN) 750 MG tablet Take 1 tablet (750 mg) by mouth every 6 hours as needed for muscle spasms 45 tablet 1 Past Month     Multiple Vitamins-Minerals (MULTIVITAMIN ADULTS PO) Take 1 tablet by mouth daily   Past Month     nicotine (NICODERM CQ) 14 MG/24HR 24 hr patch Place 1 patch onto the skin every 24 hours for 42 days 42 patch 0 Unknown     [START ON 12/15/2022] nicotine (NICODERM CQ) 7 MG/24HR 24 hr patch Place 1 patch onto the skin every 24 hours 14 patch 0 Unknown     POTASSIUM PO Take 1 tablet by mouth daily   Past Month

## 2022-11-22 NOTE — ANESTHESIA CARE TRANSFER NOTE
Patient: Brenna Fournier    Procedure: Procedure(s):  Lumbar 3-4 lumbar decompression       Diagnosis: Lumbar stenosis [M48.061]  Diagnosis Additional Information: No value filed.    Anesthesia Type:   General     Note:    Oropharynx: oropharynx clear of all foreign objects  Level of Consciousness: awake  Oxygen Supplementation: face mask  Level of Supplemental Oxygen (L/min / FiO2): 6 lpm  Independent Airway: airway patency satisfactory and stable  Dentition: dentition unchanged  Vital Signs Stable: post-procedure vital signs reviewed and stable  Report to RN Given: handoff report given  Patient transferred to: PACU  Comments: Patient oral suctioned. Patient with spontaneous respirations and adequate tidal volumes. Patient awake and responsive. Extubated in OR to 6L facemask. To PACU ventilating well. VSS. Report given.  Handoff Report: Identifed the Patient, Identified the Reponsible Provider, Reviewed the pertinent medical history, Discussed the surgical course, Reviewed Intra-OP anesthesia mangement and issues during anesthesia, Set expectations for post-procedure period and Allowed opportunity for questions and acknowledgement of understanding      Vitals:  Vitals Value Taken Time   /96 11/22/22 1455   Temp     Pulse 70 11/22/22 1456   Resp 17 11/22/22 1458   SpO2 100 % 11/22/22 1458   Vitals shown include unvalidated device data.    Electronically Signed By: CITLALLI Baxter CRNA  November 22, 2022  3:00 PM

## 2022-11-22 NOTE — BRIEF OP NOTE
[facility]    Brief Operative Note    Pre-operative diagnosis: Lumbar stenosis [M48.061]  Post-operative diagnosis Same as pre-operative diagnosis    Procedure: Procedure(s):  Lumbar 3-4 lumbar decompression  Surgeon: Surgeon(s) and Role:     * Roel Holley MD - Primary     * Yoel Mayfield PA-C - Assisting  Anesthesia: General   Estimated Blood Loss: 150 mL from 11/22/2022 12:18 PM to 11/22/2022  2:52 PM      Drains: Hemovac  Specimens: * No specimens in log *  Findings:   Atypical bleeding inherent to the operation that was recognized and controlled.  Complications: None.  Implants: * No implants in log *

## 2022-11-22 NOTE — ANESTHESIA PROCEDURE NOTES
Airway       Patient location during procedure: OR       Procedure Start/Stop Times: 11/22/2022 12:26 PM  Staff -        CRNA: Noemi Rea APRN CRNA       Performed By: CRNA  Consent for Airway        Urgency: elective  Indications and Patient Condition       Indications for airway management: anthony-procedural       Induction type:intravenous       Mask difficulty assessment: 1 - vent by mask    Final Airway Details       Final airway type: endotracheal airway       Successful airway: ETT - single  Endotracheal Airway Details        ETT size (mm): 7.0       Cuffed: yes       Cuff volume (mL): 5       Successful intubation technique: direct laryngoscopy       DL Blade Type: Anders 2       Grade View of Cords: 2       Adjucts: stylet       Position: Right       Measured from: lips       Secured at (cm): 22       Bite block used: None    Post intubation assessment        Placement verified by: capnometry, equal breath sounds and chest rise        Number of attempts at approach: 1       Number of other approaches attempted: 0       Secured with: plastic tape       Ease of procedure: easy       Dentition: Intact and Unchanged    Medication(s) Administered   Medication Administration Time: 11/22/2022 12:26 PM

## 2022-11-22 NOTE — OP NOTE
Date of service:  11/22/2022    Surgeon: Roel Carter M.D.    Assistant: Yoel Mayfield PA-C    Preoperative diagnosis:    1.  Spinal stenosis, L3-L4    Postoperative diagnosis:    1.  Spinal stenosis, L3-L4    Procedure: Open lumbar decompression, L3-L4    Anesthesia: General endotracheal tube anesthesia.    Estimated blood loss: 150mL    Complications: No intraoperative complications noted.    Operative indications:     Severe neurogenic claudication with associated lumbar radiculopathy unresponsive to adequate non-surgical treatment.     57 year old female with above surgical indications is seen today at pre-operative holding area.  We reviewed patient history, physical examination and advance imaging, there has been no change since last clinical visit. We again discussed the intra operative, post operative, rehabilitation, recovery and expectations details of the proposed procedure.     We thoroughly discussed details of surgery including the benefits and risks associated to the particular procedure and from the surgical process in general.  We went through specific details of risks again as detailed on our surgical discussion clinical note on 9/20/2022.  Today before surgery we again discussed the intraoperative,  postoperative, post surgical rehabilitation, recovery and general expectations of surgery.  We then discussed risks of surgery which includes but are not limited to:    Anesthesia complications including death, stroke, heart attack, pulmonary embolism, respiratory depression and deep vein thrombosis. Approach complications including infection, dural tears, which could need and end up with a primary or secondary repair needing added length of stay, laying flat for multiple days, subsequent surgeries for repair and/or the need of a lumbar drain, neurological damage including loss of motor strength persistent pain and/or abnormal sensation over one or several dermatomal distributions, expanding  hematoma requiring subsequent surgery for evacuation, vision loss, temorary or permanent peripheral neuropathy due to positioning and iatrogenic instability following decompression.    Patient understood the stated diagnosis and the recommended treatment option including accepting the risks associated with it. Patient would like to proceed with surgical management, all questions were answered.         Description of operation: Patient was seen and identified in the preoperative holding area.  The appropriate site was marked with surgeon's initials.  Written consent was obtained and documented.  Pneumatic boots were applied for DVT prophylaxis.  The patient was brought back to the operating room where they were induced under general anesthesia.  IV antibiotic prophylaxis was given according to patient's allergies and adverse reactions. After induction of general anesthesia, the patient was positioned prone onto a pro axis table.  Care was taken to make sure extremities were well padded.  The lumbar spine was sterilely prepped and draped in the usual sterile fashion.  A timeout was taken per hospital protocol.  A midline skin incision was made and soft tissue resection carried out to the lateral pars at L3.  Kocher was placed in the L3 spinous process confirming we were at the appropriate levels on the lateral fluoroscopy.  We then removed the interspinous ligament.  We then removed the spinous process of the operative level.  We then used a Midas to thin the lamina down to the ligamentum flavum and then resected the central lamina.  I then performed bilateral medial facetectomies and foraminotomies.    At the end of the surgery was able to easily pass a small ball-tipped probe through the subarticular recess and down through the neuro foramen bilaterally.  I was satisfied with the decompression. 2L of normal saline were used to thoroughly irrigate. 1 gram of vancomycin powder was used around the superficial and deep  wound.   A medium Hemovac drain was placed, and the wound was closed in layers.  Sterile dressings were used to cover the wound.    There were no intraoperative complications during today's case.  Sponge and needle counts were correct at the end of the case.  Estimated blood loss was about 150mL.    Yoel Mayfield,  PAC, assisted with the procedure.  His role included, but was not limited to, skilled use of the suction to maintain a clear visual field close to the dura as well as multilayer wound closure.  Skilled spine PA was required for both safety and efficacy during this procedure.  The series could not be safely replicated by other operating room personnel.

## 2022-11-22 NOTE — ANESTHESIA PREPROCEDURE EVALUATION
Anesthesia Pre-Procedure Evaluation    Patient: Brenna Fournier   MRN: [id number] : 1965        Procedure : Procedure(s):  Lumbar 3-4 lumbar decompression          Past Medical History:   Diagnosis Date     Cystic acne      Elevated blood pressure reading without diagnosis of hypertension 2019     Jaw pain      Localized pain of knee joint      Obesity       Past Surgical History:   Procedure Laterality Date     COLONOSCOPY  2020     DECOMPRESSION LUMBAR TWO LEVELS N/A 2022    Procedure: Lumbar 2-Lumbar 3, Lumbar 3-Lumbar 4, Lumbar Decompression;  Surgeon: Roel Holley MD;  Location:  OR      KNEE SCOPE, DIAGNOSTIC      Description: Arthroscopy Knee Right;  Recorded: 2012;     HC SHLDR ARTHROSCOP,SURG,W/REMOVAL,LOOSE/FB      Description: Shoulder Arthroscopy;  Recorded: 2012;     JOINT REPLACEMENT       THYROIDECTOMY, PARTIAL Left 2019    Procedure: LEFT THYROID LOBECTOMY;  Surgeon: Santosh Doe MD;  Location: [facility];  Service: ENT     TRANSPLANT      Knee Replacement      Allergies   Allergen Reactions     Gadolinium-Containing Contrast Media [Gadolinium Derivatives] Anaphylaxis and Itching     Diatrizoate Meglumine [Diatrizoate] Itching     5 minutes post CT injection, patient noted her eyes and ears were extremely itchy     Esomeprazole Sodium [Esomeprazole] Unknown     Swelling     Gluten [Gluten Meal] Itching     Hydrocodone-Acetaminophen Unknown     Hyperactivity, itching     Latex Blisters     Omeprazole Unknown     Swelling- ankles      Social History     Tobacco Use     Smoking status: Every Day     Packs/day: 0.50     Years: 25.00     Pack years: 12.50     Types: Cigarettes     Smokeless tobacco: Never     Tobacco comments:     Have stopped off and on   Substance Use Topics     Alcohol use: Yes     Comment: 10 drinks per week      Wt Readings from Last 1 Encounters:   22 106.1 kg (233 lb 12.8 oz)        Anesthesia  Evaluation            ROS/MED HX  ENT/Pulmonary:     (+) tobacco use,     Neurologic:  - neg neurologic ROS     Cardiovascular:  - neg cardiovascular ROS     METS/Exercise Tolerance:     Hematologic:  - neg hematologic  ROS     Musculoskeletal:   (+) arthritis,     GI/Hepatic:     (+) hiatal hernia,     Renal/Genitourinary:  - neg Renal ROS     Endo: Comment: .Body mass index is 35.81 kg/m .      (+) thyroid problem, Obesity,     Psychiatric/Substance Use:  - neg psychiatric ROS     Infectious Disease:  - neg infectious disease ROS     Malignancy:  - neg malignancy ROS     Other: Comment: .Lab Test        11/02/22     05/17/22     10/19/21                       1418          1105          1129          WBC          6.4          7.0          7.6           HGB          15.0         16.1*        14.9          MCV          93           93           92            PLT          229          237          248           INR           --          1.03          --            Lab Test        11/02/22 05/25/22 05/17/22 07/29/21                       1418          0643          1105          1654          NA           139           --          141          140           POTASSIUM    4.1           --          4.5          4.1           CHLORIDE     105           --          105          105           CO2          22            --          26           22            BUN          32.9*         --          22           23*           CR           0.89          --          0.84         0.86          ANIONGAP     12            --          10           13            MEGA          8.5*          --          8.9          9.4           GLC          97           139*         89           94             - neg other ROS          Physical Exam    Airway        Mallampati: II    Neck ROM: full     Respiratory Devices and Support         Dental           Cardiovascular   cardiovascular exam normal       Rhythm and rate: regular      Pulmonary   pulmonary exam normal                OUTSIDE LABS:  CBC:   Lab Results   Component Value Date    WBC 6.4 11/02/2022    WBC 7.0 05/17/2022    HGB 15.0 11/02/2022    HGB 16.1 (H) 05/17/2022    HCT 44.0 11/02/2022    HCT 48.0 (H) 05/17/2022     11/02/2022     05/17/2022     BMP:   Lab Results   Component Value Date     11/02/2022     05/17/2022    POTASSIUM 4.1 11/02/2022    POTASSIUM 4.5 05/17/2022    CHLORIDE 105 11/02/2022    CHLORIDE 105 05/17/2022    CO2 22 11/02/2022    CO2 26 05/17/2022    BUN 32.9 (H) 11/02/2022    BUN 22 05/17/2022    CR 0.89 11/02/2022    CR 0.84 05/17/2022    GLC 97 11/02/2022     (H) 05/25/2022     COAGS:   Lab Results   Component Value Date    PTT 31 05/17/2022    INR 1.03 05/17/2022     POC: No results found for: BGM, HCG, HCGS  HEPATIC:   Lab Results   Component Value Date    ALBUMIN 4.0 05/17/2022    PROTTOTAL 6.8 05/17/2022    ALT 27 05/17/2022    AST 26 05/17/2022    ALKPHOS 94 05/17/2022    BILITOTAL 0.7 05/17/2022     OTHER:   Lab Results   Component Value Date    A1C 5.2 07/05/2018    MEGA 8.5 (L) 11/02/2022    LIPASE 19 08/07/2020    TSH 3.63 05/17/2022    T3 88 01/11/2018       Anesthesia Plan    ASA Status:  3      Anesthesia Type: General.     - Airway: ETT   Induction: Intravenous, Propofol.   Maintenance: Balanced.        Consents    Anesthesia Plan(s) and associated risks, benefits, and realistic alternatives discussed. Questions answered and patient/representative(s) expressed understanding.    - Discussed:     - Discussed with:  Patient         Postoperative Care    Pain management: IV analgesics, Oral pain medications, Multi-modal analgesia.   PONV prophylaxis: Ondansetron (or other 5HT-3), Dexamethasone or Solumedrol     Comments:                Elias Gonzalez DO

## 2022-11-22 NOTE — INTERVAL H&P NOTE
"I have reviewed the surgical (or preoperative) H&P that is linked to this encounter, and examined the patient. There are no significant changes    Clinical Conditions Present on Arrival:  Clinically Significant Risk Factors Present on Admission                    # Obesity: Estimated body mass index is 35.81 kg/m  as calculated from the following:    Height as of this encounter: 1.721 m (5' 7.75\").    Weight as of this encounter: 106.1 kg (233 lb 12.8 oz).       "

## 2022-11-23 VITALS
HEIGHT: 68 IN | TEMPERATURE: 97.6 F | BODY MASS INDEX: 35.43 KG/M2 | OXYGEN SATURATION: 95 % | RESPIRATION RATE: 18 BRPM | SYSTOLIC BLOOD PRESSURE: 132 MMHG | HEART RATE: 61 BPM | DIASTOLIC BLOOD PRESSURE: 74 MMHG | WEIGHT: 233.8 LBS

## 2022-11-23 PROCEDURE — 250N000013 HC RX MED GY IP 250 OP 250 PS 637: Performed by: ORTHOPAEDIC SURGERY

## 2022-11-23 RX ADMIN — SENNOSIDES AND DOCUSATE SODIUM 1 TABLET: 50; 8.6 TABLET ORAL at 08:33

## 2022-11-23 RX ADMIN — ACETAMINOPHEN 975 MG: 325 TABLET, FILM COATED ORAL at 03:00

## 2022-11-23 RX ADMIN — GABAPENTIN 100 MG: 100 CAPSULE ORAL at 08:33

## 2022-11-23 RX ADMIN — FAMOTIDINE 20 MG: 20 TABLET ORAL at 08:33

## 2022-11-23 RX ADMIN — OXYCODONE HYDROCHLORIDE 5 MG: 5 TABLET ORAL at 03:44

## 2022-11-23 RX ADMIN — POLYETHYLENE GLYCOL 3350 17 G: 17 POWDER, FOR SOLUTION ORAL at 08:33

## 2022-11-23 ASSESSMENT — ACTIVITIES OF DAILY LIVING (ADL)
ADLS_ACUITY_SCORE: 32
ADLS_ACUITY_SCORE: 34
ADLS_ACUITY_SCORE: 32
ADLS_ACUITY_SCORE: 34
ADLS_ACUITY_SCORE: 32
ADLS_ACUITY_SCORE: 34

## 2022-11-23 NOTE — PLAN OF CARE
Goal Outcome Evaluation:      Plan of Care Reviewed With: patient    Patient vital signs are at baseline: Yes  Patient able to ambulate as they were prior to admission or with assist devices provided by therapies during their stay:  Yes  Patient MUST void prior to discharge:  Yes  Patient able to tolerate oral intake:  Yes  Pain has adequate pain control using Oral analgesics:  Yes  Does patient have an identified :  Yes  Has goal D/C date and time been discussed with patient:  Yes    Pt A/Ox4, VSS on RA- weaned off O2 last night. Sats in mid 90s. SL, tolerating regular diet and PO intake. Voiding adequately, up to bathroom with assist of 1, walker and GB. Pain rating mild-moderate- PRN oxycodone 5 mg and Scheduled Tylenol given. Plan to discharge home today.

## 2022-11-23 NOTE — PLAN OF CARE
Goal Outcome Evaluation:      Plan of Care Reviewed With: patient    Patient vital signs are at baseline: Yes  Patient able to ambulate as they were prior to admission or with assist devices provided by therapies during their stay:  Yes  Patient MUST void prior to discharge:  Yes  Patient able to tolerate oral intake:  Yes  Pain has adequate pain control using Oral analgesics:  Yes  Does patient have an identified :  Yes  Has goal D/C date and time been discussed with patient:  Yes    Pt A&O x 4. VSS. Assist x 1 with walker and gait belt. Tolerating regular diet. On 2L O2 via NC. On CAPNO. LS clear. BS+. PIV SL. C/o lower back pain 5/10. Scheduled Tylenol and PRN Dilaudid IV given with some relief. PRN IV Zofran given for nausea. Voided. Hemovac output 80cc

## 2022-11-23 NOTE — PLAN OF CARE
PT:  Patient discharged home with family prior to IP PT evaluation.  Orders for activity as tolerated with no heavy lifting, pushing, or pulling with the implant side for 2 months.  Family to assist with transportation as no driving while on analgesics.  Defer to medical team recommendations for discharge.

## 2022-11-23 NOTE — DISCHARGE SUMMARY
Physician Discharge Summary     Patient ID:  Brenna Fournier  [id number]  57 year old  1965    Admit date: 11/22/2022    Discharge date and time: 11/23/2022     Admitting Physician: Roel oHlley MD     Discharge Physician: same    Admission Diagnoses: Lumbar stenosis [M48.061]  Spinal stenosis of lumbar region with neurogenic claudication [M48.062]    Discharge Diagnoses: same    Admission Condition: good    Discharged Condition: good    Indication for Admission: lumbar decompression    Hospital Course: unremarkable    Consults: none    Significant Diagnostic Studies: none    Treatments: surgery: lumbar decompression    Discharge Exam:  GENERAL APPEARANCE: healthy, alert and no distress  EYES: Eyes grossly normal to inspection, PERRL and conjunctivae and sclerae normal  HENT: ear canals and TM's normal, nose and mouth without ulcers or lesions, oropharynx clear and oral mucous membranes moist  NECK: no adenopathy, no asymmetry, masses, or scars and thyroid normal to palpation  RESP: lungs clear to auscultation - no rales, rhonchi or wheezes  CV: regular rates and rhythm, normal S1 S2, no S3 or S4, no murmur, click or rub, no peripheral edema and peripheral pulses strong  ABDOMEN: soft, nontender, no hepatosplenomegaly, no masses and bowel sounds normal   (male): normal male genitalia without lesions or urethral discharge, no hernia  RECTAL: normal sphincter tone, no rectal masses, prostate of normal size, smooth, nontender without nodules or masses  MS: no musculoskeletal defects are noted and gait is age appropriate without ataxia  SKIN: no suspicious lesions or rashes  NEURO: Normal strength and tone, sensory exam grossly normal, mentation intact and speech normal  PSYCH: mentation appears normal and affect normal/bright    Disposition: home    Patient Instructions:   Current Discharge Medication List      START taking these medications    Details   acetaminophen (TYLENOL) 325 MG tablet Take 2  tablets (650 mg) by mouth every 4 hours as needed for mild pain  Qty: 50 tablet, Refills: 0    Associated Diagnoses: Acute post-operative pain      gabapentin (NEURONTIN) 300 MG capsule Take two capsules by mouth at bedtime first night, then increase to three capsules by mouth at bedtime second night and following nights  Qty: 45 capsule, Refills: 0    Associated Diagnoses: Acute post-operative pain      hydrOXYzine (ATARAX) 25 MG tablet Take 1 tablet (25 mg) by mouth every 6 hours as needed for itching (and nausea)  Qty: 30 tablet, Refills: 0    Associated Diagnoses: Acute post-operative pain      oxyCODONE (ROXICODONE) 5 MG tablet Take 1-2 tablets (5-10 mg) by mouth every 4 hours as needed for moderate to severe pain  Qty: 20 tablet, Refills: 0    Associated Diagnoses: Acute post-operative pain      senna-docusate (SENOKOT-S/PERICOLACE) 8.6-50 MG tablet Take 1 tablet by mouth daily  Qty: 30 tablet, Refills: 0    Associated Diagnoses: Acute post-operative pain         CONTINUE these medications which have CHANGED    Details   methocarbamol (ROBAXIN) 750 MG tablet Take 1 tablet (750 mg) by mouth every 6 hours as needed for muscle spasms (muscle spasm)  Qty: 60 tablet, Refills: 0    Associated Diagnoses: Acute post-operative pain         CONTINUE these medications which have NOT CHANGED    Details   Cyanocobalamin (B-12 PO) Take 1 tablet by mouth daily      MAGNESIUM PO Take 1 tablet by mouth daily      Multiple Vitamins-Minerals (MULTIVITAMIN ADULTS PO) Take 1 tablet by mouth daily      nicotine (NICODERM CQ) 14 MG/24HR 24 hr patch Place 1 patch onto the skin every 24 hours for 42 days  Qty: 42 patch, Refills: 0    Associated Diagnoses: Encounter for tobacco use cessation counseling      POTASSIUM PO Take 1 tablet by mouth daily         STOP taking these medications       nicotine (NICODERM CQ) 7 MG/24HR 24 hr patch Comments:   Reason for Stopping:             Activity: activity as tolerated, activity as tolerated  and no driving for today, no heavy lifting, pushing, pulling with the implant side for 2 months and no driving while on analgesics  Diet: regular diet  Wound Care: keep wound clean and dry    Follow-up with Raul in 6 weeks.    Signed:  Roel Holley MD  11/23/2022  6:24 AM

## 2022-11-23 NOTE — PROGRESS NOTES
Patient vital signs are at baseline: Yes  Patient able to ambulate as they were prior to admission or with assist devices provided by therapies during their stay:  Yes  Patient MUST void prior to discharge:  Yes  Patient able to tolerate oral intake:  Yes  Pain has adequate pain control using Oral analgesics:  Yes  Does patient have an identified :  Yes  Has goal D/C date and time been discussed with patient:  Yes    Patient discharged. Instructions gone over. Questions answered. Medications sent with. Acetaminophen not present. Family declined to wait for [facility] and has their own supply. Dressing changed and reinforced prior to discharge.

## 2022-11-28 ENCOUNTER — DOCUMENTATION ONLY (OUTPATIENT)
Dept: OTHER | Facility: CLINIC | Age: 57
End: 2022-11-28

## 2023-01-04 ENCOUNTER — TRANSFERRED RECORDS (OUTPATIENT)
Dept: HEALTH INFORMATION MANAGEMENT | Facility: CLINIC | Age: 58
End: 2023-01-04
Payer: COMMERCIAL

## 2023-01-23 ENCOUNTER — E-VISIT (OUTPATIENT)
Dept: FAMILY MEDICINE | Facility: CLINIC | Age: 58
End: 2023-01-23
Payer: COMMERCIAL

## 2023-01-23 DIAGNOSIS — R06.02 SHORTNESS OF BREATH: Primary | ICD-10-CM

## 2023-01-23 PROCEDURE — 99207 PR NO CHARGE LOS: CPT | Performed by: FAMILY MEDICINE

## 2023-01-23 NOTE — PATIENT INSTRUCTIONS
Dear Brenna Fournier,    We are sorry you are not feeling well. Based on the responses you provided, it is recommended that you be seen in-person in urgent care so we can better evaluate your symptoms. Please click here to find the nearest urgent care location to you.   You will not be charged for this Visit. Thank you for trusting us with your care.    Maru Emerson, DO

## 2023-01-23 NOTE — TELEPHONE ENCOUNTER
Provider E-Visit time total (minutes): n/a - recommend in person visit for evaluation of symptoms.  Maru Emerson, DO

## 2023-02-15 ENCOUNTER — TRANSFERRED RECORDS (OUTPATIENT)
Dept: HEALTH INFORMATION MANAGEMENT | Facility: CLINIC | Age: 58
End: 2023-02-15

## 2023-04-05 ENCOUNTER — TRANSFERRED RECORDS (OUTPATIENT)
Dept: HEALTH INFORMATION MANAGEMENT | Facility: CLINIC | Age: 58
End: 2023-04-05
Payer: COMMERCIAL

## 2023-06-24 ENCOUNTER — HEALTH MAINTENANCE LETTER (OUTPATIENT)
Age: 58
End: 2023-06-24

## 2023-07-24 ASSESSMENT — ENCOUNTER SYMPTOMS
SORE THROAT: 0
DIZZINESS: 0
MYALGIAS: 0
FREQUENCY: 0
CONSTIPATION: 0
PARESTHESIAS: 0
SHORTNESS OF BREATH: 0
JOINT SWELLING: 0
HEADACHES: 0
FEVER: 0
WEAKNESS: 0
COUGH: 0
ABDOMINAL PAIN: 0
HEMATURIA: 0
DYSURIA: 0
EYE PAIN: 0
CHILLS: 0
BREAST MASS: 0
HEARTBURN: 0
PALPITATIONS: 0
DIARRHEA: 0
NERVOUS/ANXIOUS: 0
ARTHRALGIAS: 0
HEMATOCHEZIA: 0
NAUSEA: 0

## 2023-07-27 ENCOUNTER — TRANSFERRED RECORDS (OUTPATIENT)
Dept: HEALTH INFORMATION MANAGEMENT | Facility: CLINIC | Age: 58
End: 2023-07-27
Payer: COMMERCIAL

## 2023-07-28 ENCOUNTER — OFFICE VISIT (OUTPATIENT)
Dept: FAMILY MEDICINE | Facility: CLINIC | Age: 58
End: 2023-07-28
Payer: COMMERCIAL

## 2023-07-28 VITALS
HEIGHT: 66 IN | OXYGEN SATURATION: 99 % | WEIGHT: 235 LBS | DIASTOLIC BLOOD PRESSURE: 76 MMHG | BODY MASS INDEX: 37.77 KG/M2 | TEMPERATURE: 98.7 F | HEART RATE: 73 BPM | SYSTOLIC BLOOD PRESSURE: 128 MMHG

## 2023-07-28 DIAGNOSIS — R25.2 LEG CRAMPS: ICD-10-CM

## 2023-07-28 DIAGNOSIS — Z12.11 SCREEN FOR COLON CANCER: ICD-10-CM

## 2023-07-28 DIAGNOSIS — E89.0 H/O PARTIAL THYROIDECTOMY: ICD-10-CM

## 2023-07-28 DIAGNOSIS — Z00.00 ROUTINE GENERAL MEDICAL EXAMINATION AT A HEALTH CARE FACILITY: Primary | ICD-10-CM

## 2023-07-28 DIAGNOSIS — Z12.31 VISIT FOR SCREENING MAMMOGRAM: ICD-10-CM

## 2023-07-28 DIAGNOSIS — N95.1 VAGINAL DRYNESS, MENOPAUSAL: ICD-10-CM

## 2023-07-28 DIAGNOSIS — Z13.6 CARDIOVASCULAR SCREENING; LDL GOAL LESS THAN 160: ICD-10-CM

## 2023-07-28 LAB
ALBUMIN SERPL BCG-MCNC: 4.7 G/DL (ref 3.5–5.2)
ALP SERPL-CCNC: 101 U/L (ref 35–104)
ALT SERPL W P-5'-P-CCNC: 58 U/L (ref 0–50)
ANION GAP SERPL CALCULATED.3IONS-SCNC: 9 MMOL/L (ref 7–15)
AST SERPL W P-5'-P-CCNC: 42 U/L (ref 0–45)
BILIRUB SERPL-MCNC: 0.8 MG/DL
BUN SERPL-MCNC: 18.7 MG/DL (ref 8–23)
CALCIUM SERPL-MCNC: 9.6 MG/DL (ref 8.8–10.2)
CHLORIDE SERPL-SCNC: 103 MMOL/L (ref 98–107)
CHOLEST SERPL-MCNC: 202 MG/DL
CREAT SERPL-MCNC: 0.98 MG/DL (ref 0.51–0.95)
DEPRECATED HCO3 PLAS-SCNC: 27 MMOL/L (ref 22–29)
GFR SERPL CREATININE-BSD FRML MDRD: 66 ML/MIN/1.73M2
GLUCOSE SERPL-MCNC: 97 MG/DL (ref 70–99)
HDLC SERPL-MCNC: 58 MG/DL
LDLC SERPL CALC-MCNC: 106 MG/DL
MAGNESIUM SERPL-MCNC: 1.9 MG/DL (ref 1.7–2.3)
NONHDLC SERPL-MCNC: 144 MG/DL
POTASSIUM SERPL-SCNC: 4.3 MMOL/L (ref 3.4–5.3)
PROT SERPL-MCNC: 7.3 G/DL (ref 6.4–8.3)
SODIUM SERPL-SCNC: 139 MMOL/L (ref 136–145)
T4 FREE SERPL-MCNC: 0.99 NG/DL (ref 0.9–1.7)
TRIGL SERPL-MCNC: 190 MG/DL
TSH SERPL DL<=0.005 MIU/L-ACNC: 4.98 UIU/ML (ref 0.3–4.2)

## 2023-07-28 PROCEDURE — 86800 THYROGLOBULIN ANTIBODY: CPT | Performed by: PHYSICIAN ASSISTANT

## 2023-07-28 PROCEDURE — 84443 ASSAY THYROID STIM HORMONE: CPT | Performed by: PHYSICIAN ASSISTANT

## 2023-07-28 PROCEDURE — 84439 ASSAY OF FREE THYROXINE: CPT | Performed by: PHYSICIAN ASSISTANT

## 2023-07-28 PROCEDURE — 84480 ASSAY TRIIODOTHYRONINE (T3): CPT | Performed by: PHYSICIAN ASSISTANT

## 2023-07-28 PROCEDURE — 83735 ASSAY OF MAGNESIUM: CPT | Performed by: PHYSICIAN ASSISTANT

## 2023-07-28 PROCEDURE — 99214 OFFICE O/P EST MOD 30 MIN: CPT | Mod: 25 | Performed by: PHYSICIAN ASSISTANT

## 2023-07-28 PROCEDURE — 99396 PREV VISIT EST AGE 40-64: CPT | Performed by: PHYSICIAN ASSISTANT

## 2023-07-28 PROCEDURE — 80061 LIPID PANEL: CPT | Performed by: PHYSICIAN ASSISTANT

## 2023-07-28 PROCEDURE — 86376 MICROSOMAL ANTIBODY EACH: CPT | Performed by: PHYSICIAN ASSISTANT

## 2023-07-28 PROCEDURE — 80053 COMPREHEN METABOLIC PANEL: CPT | Performed by: PHYSICIAN ASSISTANT

## 2023-07-28 PROCEDURE — 36415 COLL VENOUS BLD VENIPUNCTURE: CPT | Performed by: PHYSICIAN ASSISTANT

## 2023-07-28 RX ORDER — LEVOTHYROXINE SODIUM 25 UG/1
25 TABLET ORAL DAILY
Qty: 90 TABLET | Refills: 1 | Status: SHIPPED | OUTPATIENT
Start: 2023-07-28 | End: 2024-01-16

## 2023-07-28 ASSESSMENT — ENCOUNTER SYMPTOMS
JOINT SWELLING: 0
ARTHRALGIAS: 0
CONSTIPATION: 0
HEARTBURN: 0
NAUSEA: 0
DIZZINESS: 0
DIARRHEA: 0
PALPITATIONS: 0
HEMATOCHEZIA: 0
SORE THROAT: 0
PARESTHESIAS: 0
DYSURIA: 0
CHILLS: 0
MYALGIAS: 0
ABDOMINAL PAIN: 0
HEADACHES: 0
COUGH: 0
WEAKNESS: 0
EYE PAIN: 0
HEMATURIA: 0
FREQUENCY: 0
NERVOUS/ANXIOUS: 0
FEVER: 0
BREAST MASS: 0
SHORTNESS OF BREATH: 0

## 2023-07-28 NOTE — PROGRESS NOTES
SUBJECTIVE:   CC: Brenna is an 58 year old who presents for preventive health visit.       Healthy Habits:     Getting at least 3 servings of Calcium per day:  NO    Bi-annual eye exam:  Yes    Dental care twice a year:  Yes    Sleep apnea or symptoms of sleep apnea:  None    Diet:  Gluten-free/reduced    Frequency of exercise:  1 day/week    Duration of exercise:  Less than 15 minutes    Taking medications regularly:  Yes    Medication side effects:  None    -She would like her thyroid checked today. She has been having hair loss, sweating, muscle spasms and leg cramps, hard time losing weight. She did have a thyroidectomy.   Had a partial thyroidectomy several years ago due to nodules   -She would like something for vaginal dryness.     Today's PHQ-2 Score:       7/28/2023     7:39 AM   PHQ-2 ( 1999 [employer])   Q1: Little interest or pleasure in doing things 0   Q2: Feeling down, depressed or hopeless 0   PHQ-2 Score 0   Q1: Little interest or pleasure in doing things Not at all   Q2: Feeling down, depressed or hopeless Not at all   PHQ-2 Score 0           Social History     Tobacco Use    Smoking status: Every Day     Packs/day: 0.50     Years: 25.00     Pack years: 12.50     Types: Cigarettes    Smokeless tobacco: Never    Tobacco comments:     Have stopped off and on   Substance Use Topics    Alcohol use: Yes     Comment: 10 drinks per week           7/24/2023    10:29 AM   Alcohol Use   Prescreen: >3 drinks/day or >7 drinks/week? No     Reviewed orders with patient.  Reviewed health maintenance and updated orders accordingly - Yes  BP Readings from Last 3 Encounters:   07/28/23 128/76   11/23/22 132/74   11/02/22 139/89    Wt Readings from Last 3 Encounters:   07/28/23 106.6 kg (235 lb)   11/22/22 106.1 kg (233 lb 12.8 oz)   11/02/22 108.9 kg (240 lb 2 oz)                    Breast Cancer Screening:    FHS-7:       5/16/2022     8:08 AM 10/26/2022    12:39 PM 6/19/2023     8:59 AM 7/24/2023    10:29 AM    Breast CA Risk Assessment (FHS-7)   Did any of your first-degree relatives have breast or ovarian cancer? No No No    No    No    No No   Did any of your relatives have bilateral breast cancer? No No Yes    Yes    Yes    Yes Yes   Did any man in your family have breast cancer? No No No    No    No    No No   Did any woman in your family have breast and ovarian cancer? No No No    No    No    No No   Did any woman in your family have breast cancer before age 50 y? No No No    No    No    No No   Do you have 2 or more relatives with breast and/or ovarian cancer? No No No    No    No    No No   Do you have 2 or more relatives with breast and/or bowel cancer? No No No    No    No    No No         Pertinent mammograms are reviewed under the imaging tab.    History of abnormal Pap smear: NO - age 30-65 PAP every 5 years with negative HPV co-testing recommended      Latest Ref Rng & Units 7/29/2021     5:09 PM   PAP / HPV   PAP  Negative for Intraepithelial Lesion or Malignancy (NILM)    HPV 16 DNA Negative Negative    HPV 18 DNA Negative Negative    Other HR HPV Negative Negative      Reviewed and updated as needed this visit by clinical staff    Allergies  Meds              Reviewed and updated as needed this visit by Provider                     Review of Systems   Constitutional:  Negative for chills and fever.   HENT:  Negative for congestion, ear pain, hearing loss and sore throat.    Eyes:  Negative for pain and visual disturbance.   Respiratory:  Negative for cough and shortness of breath.    Cardiovascular:  Negative for chest pain, palpitations and peripheral edema.   Gastrointestinal:  Negative for abdominal pain, constipation, diarrhea, heartburn, hematochezia and nausea.   Breasts:  Negative for tenderness, breast mass and discharge.   Genitourinary:  Negative for dysuria, frequency, genital sores, hematuria, pelvic pain, urgency, vaginal bleeding and vaginal discharge.   Musculoskeletal:  Negative for  "arthralgias, joint swelling and myalgias.   Skin:  Negative for rash.   Neurological:  Negative for dizziness, weakness, headaches and paresthesias.   Psychiatric/Behavioral:  Negative for mood changes. The patient is not nervous/anxious.           OBJECTIVE:   /76   Pulse 73   Temp 98.7  F (37.1  C) (Tympanic)   Ht 1.67 m (5' 5.75\")   Wt 106.6 kg (235 lb)   LMP  (LMP Unknown)   SpO2 99%   BMI 38.22 kg/m    Physical Exam  GENERAL APPEARANCE: healthy, alert and no distress  EYES: Eyes grossly normal to inspection, PERRL and conjunctivae and sclerae normal  HENT: ear canals and TM's normal, nose and mouth without ulcers or lesions, oropharynx clear and oral mucous membranes moist  NECK: no adenopathy, no asymmetry, masses, or scars and thyroid normal to palpation  RESP: lungs clear to auscultation - no rales, rhonchi or wheezes  CV: regular rate and rhythm, normal S1 S2, no S3 or S4, no murmur, click or rub, no peripheral edema and peripheral pulses strong  ABDOMEN: soft, nontender, no hepatosplenomegaly, no masses and bowel sounds normal  MS: no musculoskeletal defects are noted and gait is age appropriate without ataxia  SKIN: no suspicious lesions or rashes  NEURO: Normal strength and tone, sensory exam grossly normal, mentation intact and speech normal  PSYCH: mentation appears normal and affect normal/bright    Diagnostic Test Results:  pending    ASSESSMENT/PLAN:   (Z00.00) Routine general medical examination at a health care facility  (primary encounter diagnosis)  Comment:   Plan: Lipid panel reflex to direct LDL Fasting,         Comprehensive metabolic panel (BMP + Alb, Alk         Phos, ALT, AST, Total. Bili, TP), Magnesium            (Z12.31) Visit for screening mammogram  Comment:   Plan: MA SCREENING DIGITAL BILAT - Future  (s+30)            (Z12.11) Screen for colon cancer  Comment:   Plan: Colonoscopy Screening  Referral            (E89.0) H/O partial thyroidectomy  Comment: " "recheck thyroid labs. Discussed symptoms may also be due to menopause. TSH levels on upper limit of normal range so we also discussed treating to aim to get closer to 1-2 understanding there is no clear data that this is advised but we could see what of the symptoms she is noting do improve (if at all) on the medication and then readdress. Would need to repeat labs again in 8-12 weeks  Plan: TSH, T4, free, T3, total, Thyroid peroxidase         antibody, Anti thyroglobulin antibody,         levothyroxine (SYNTHROID/LEVOTHROID) 25 MCG         tablet            (Z13.6) CARDIOVASCULAR SCREENING; LDL GOAL LESS THAN 160  Comment:   Plan: Lipid panel reflex to direct LDL Fasting,         Comprehensive metabolic panel (BMP + Alb, Alk         Phos, ALT, AST, Total. Bili, TP)            (N95.1) Vaginal dryness, menopausal  Comment: if cream is not covered/too expensive, could do estrace tablets and insert for same effect  Plan: conjugated estrogens (PREMARIN) 0.625 MG/GM         vaginal cream            (R25.2) Leg cramps  Comment: labs today  Plan: would advise she continue/restart magnesium          Patient has been advised of split billing requirements and indicates understanding: Yes      COUNSELING:  Reviewed preventive health counseling, as reflected in patient instructions      BMI:   Estimated body mass index is 38.22 kg/m  as calculated from the following:    Height as of this encounter: 1.67 m (5' 5.75\").    Weight as of this encounter: 106.6 kg (235 lb).         She reports that she has been smoking cigarettes. She has a 12.50 pack-year smoking history. She has never used smokeless tobacco.      Modesta Bruno PA-C  [facility]  "

## 2023-07-29 LAB — T3 SERPL-MCNC: 102 NG/DL (ref 85–202)

## 2023-07-31 LAB
THYROGLOB AB SERPL IA-ACNC: <20 IU/ML
THYROPEROXIDASE AB SERPL-ACNC: <10 IU/ML

## 2023-08-23 DIAGNOSIS — E04.9 GOITER: Primary | ICD-10-CM

## 2023-08-23 NOTE — PROGRESS NOTES
Brenna Fournier has an upcoming lab appointment:    Future Appointments   Date Time Provider Department Center   9/11/2023  1:45 PM [facility]     Patient is scheduled for a lab appointment with no orders available. Please review and place either future orders or HMPO (Review of Health Maintenance Protocol Orders), as appropriate.    There are no preventive care reminders to display for this patient.    Thank you  Marialuisa Mcleod

## 2023-08-30 ENCOUNTER — ANCILLARY PROCEDURE (OUTPATIENT)
Dept: MAMMOGRAPHY | Facility: CLINIC | Age: 58
End: 2023-08-30
Attending: PHYSICIAN ASSISTANT
Payer: COMMERCIAL

## 2023-08-30 DIAGNOSIS — Z12.31 VISIT FOR SCREENING MAMMOGRAM: ICD-10-CM

## 2023-08-30 PROCEDURE — 77067 SCR MAMMO BI INCL CAD: CPT

## 2023-09-06 ENCOUNTER — LAB (OUTPATIENT)
Dept: LAB | Facility: CLINIC | Age: 58
End: 2023-09-06
Payer: COMMERCIAL

## 2023-09-06 DIAGNOSIS — E04.9 GOITER: ICD-10-CM

## 2023-09-06 LAB — TSH SERPL DL<=0.005 MIU/L-ACNC: 3.24 UIU/ML (ref 0.3–4.2)

## 2023-09-06 PROCEDURE — 84443 ASSAY THYROID STIM HORMONE: CPT

## 2023-09-06 PROCEDURE — 36415 COLL VENOUS BLD VENIPUNCTURE: CPT

## 2023-09-26 ENCOUNTER — MYC MEDICAL ADVICE (OUTPATIENT)
Dept: FAMILY MEDICINE | Facility: CLINIC | Age: 58
End: 2023-09-26
Payer: COMMERCIAL

## 2023-09-26 DIAGNOSIS — G47.62 NOCTURNAL LEG CRAMPS: Primary | ICD-10-CM

## 2023-09-27 RX ORDER — CARISOPRODOL 350 MG/1
350 TABLET ORAL
Qty: 30 TABLET | Refills: 0 | Status: SHIPPED | OUTPATIENT
Start: 2023-09-27 | End: 2024-07-29

## 2023-10-17 ENCOUNTER — PATIENT OUTREACH (OUTPATIENT)
Dept: GASTROENTEROLOGY | Facility: CLINIC | Age: 58
End: 2023-10-17
Payer: COMMERCIAL

## 2023-11-30 ENCOUNTER — IMMUNIZATION (OUTPATIENT)
Dept: FAMILY MEDICINE | Facility: CLINIC | Age: 58
End: 2023-11-30
Payer: COMMERCIAL

## 2023-11-30 DIAGNOSIS — Z23 HIGH PRIORITY FOR 2019-NCOV VACCINE: ICD-10-CM

## 2023-11-30 DIAGNOSIS — Z23 NEED FOR PROPHYLACTIC VACCINATION AND INOCULATION AGAINST INFLUENZA: Primary | ICD-10-CM

## 2023-11-30 PROCEDURE — 99207 PR NO CHARGE NURSE ONLY: CPT

## 2023-11-30 PROCEDURE — 90480 ADMN SARSCOV2 VAC 1/ONLY CMP: CPT

## 2023-11-30 PROCEDURE — 90471 IMMUNIZATION ADMIN: CPT

## 2023-11-30 PROCEDURE — 91320 SARSCV2 VAC 30MCG TRS-SUC IM: CPT

## 2023-11-30 PROCEDURE — 90686 IIV4 VACC NO PRSV 0.5 ML IM: CPT

## 2024-01-16 DIAGNOSIS — E89.0 H/O PARTIAL THYROIDECTOMY: ICD-10-CM

## 2024-01-16 RX ORDER — LEVOTHYROXINE SODIUM 25 UG/1
25 TABLET ORAL DAILY
Qty: 90 TABLET | Refills: 1 | Status: SHIPPED | OUTPATIENT
Start: 2024-01-16 | End: 2024-07-11

## 2024-07-09 ENCOUNTER — PATIENT OUTREACH (OUTPATIENT)
Dept: CARE COORDINATION | Facility: CLINIC | Age: 59
End: 2024-07-09
Payer: COMMERCIAL

## 2024-07-10 DIAGNOSIS — E89.0 H/O PARTIAL THYROIDECTOMY: ICD-10-CM

## 2024-07-11 RX ORDER — LEVOTHYROXINE SODIUM 25 UG/1
25 TABLET ORAL DAILY
Qty: 90 TABLET | Refills: 0 | Status: SHIPPED | OUTPATIENT
Start: 2024-07-11

## 2024-07-11 NOTE — TELEPHONE ENCOUNTER
Requested Prescriptions   Pending Prescriptions Disp Refills    levothyroxine (SYNTHROID/LEVOTHROID) 25 MCG tablet [Pharmacy Med Name: LEVOTHYROXINE 25 MCG TABLET] 90 tablet 1     Sig: TAKE 1 TABLET BY MOUTH EVERY DAY       Thyroid Protocol Failed - 7/10/2024  1:06 AM        Failed - Medication indicated for associated diagnosis     Medication is associated with one or more of the following diagnoses:  Hypothyroidism  Thyroid stimulating hormone suppression therapy  Thyroid cancer          Passed - Patient is 12 years or older        Passed - Recent (12 mo) or future (30 days) visit within the authorizing provider's specialty     The patient must have completed an in-person or virtual visit within the past 12 months or has a future visit scheduled within the next 90 days with the authorizing provider s specialty.  Urgent care and e-visits do not quality as an office visit for this protocol.          Passed - Medication is active on med list        Passed - Normal TSH on file in past 12 months     Recent Labs   Lab Test 09/06/23  1006   TSH 3.24              Passed - No active pregnancy on record     If patient is pregnant or has had a positive pregnancy test, please check TSH.          Passed - No positive pregnancy test in past 12 months     If patient is pregnant or has had a positive pregnancy test, please check TSH.                   Naga Hardwick RN 07/11/24 10:59 AM

## 2024-07-16 ENCOUNTER — PATIENT OUTREACH (OUTPATIENT)
Dept: GASTROENTEROLOGY | Facility: CLINIC | Age: 59
End: 2024-07-16
Payer: COMMERCIAL

## 2024-07-16 DIAGNOSIS — Z12.11 SPECIAL SCREENING FOR MALIGNANT NEOPLASMS, COLON: Primary | ICD-10-CM

## 2024-07-16 NOTE — PROGRESS NOTES
"CRC Screening Colonoscopy Referral Review    Patient meets the inclusion criteria for screening colonoscopy standing order.    Ordering/Referring Provider: Modesta Bruno      BMI: Estimated body mass index is 38.22 kg/m  as calculated from the following:    Height as of 7/28/23: 1.67 m (5' 5.75\").    Weight as of 7/28/23: 106.6 kg (235 lb).     Sedation:  Does patient have any of the following conditions affecting sedation?  No medical conditions affecting sedation.    Previous Scopes:  Any previous recommendations or follow up needs based on previous scope?  na / No recommendations.    Medical Concerns to Postpone Order:  Does patient have any of the following medical concerns that should postpone/delay colonoscopy referral?  No medical conditions affecting colonoscopy referral.    Final Referral Details:  Based on patient's medical history patient is appropriate for referral order with moderate sedation. If patient's BMI > 50 do not schedule in ASC.  " POD 1 s/p right TKA. PT/OT recommended SNF placement. Plans to transfer/discharge patient to [facility] today. Patient verbalized understanding of today's discharge plan. All questions and concerns addressed. SW and CM will continue to follow for any additional needs. Discharge and follow-up instructions to be completed by the bedside nurse.    Future Appointments  Date Time Provider Department Center   6/13/2017 10:30 AM Sharda Sandhu PA-C [facility] ORTHO [facility]   6/13/2017 1:30 PM [facility]   6/13/2017 2:15 PM [facility] MRI1 [facility]   6/27/2017 9:30 AM Sadia De La Rosa MD [facility] IM [facility] PCW        06/01/17 [id number]   Final Note   Assessment Type Final Discharge Note   Discharge Disposition SNF  ([facility])   Discharge planning education complete? Yes   What phone number can be called within the next 1-3 days to see how you are doing after discharge? ([phone])   Hospital Follow Up  Appt(s) scheduled? Yes   Discharge plans and expectations educations in teach back method with documentation complete? Yes   Offered [facility] -- Bedside Delivery? Yes   Discharge/Hospital Encounter Summary to (non-[facility]) PCP n/a   Referral to Outpatient Case Management complete? n/a   Referral to / orders for Home Health Complete? n/a   30 day supply of medicines given at discharge, if documented non-compliance / non-adherence? n/a   Any social issues identified prior to discharge? No   Did you assess the readiness or willingness of the family or caregiver to support self management of care? Yes

## 2024-07-22 ENCOUNTER — TELEPHONE (OUTPATIENT)
Dept: GASTROENTEROLOGY | Facility: CLINIC | Age: 59
End: 2024-07-22
Payer: COMMERCIAL

## 2024-07-22 NOTE — TELEPHONE ENCOUNTER
"Endoscopy Scheduling Screen    Have you had a positive Covid test in the last 14 days?  No    What is your communication preference for Instructions and/or Bowel Prep?   MyChart    What insurance is in the chart?  Other:  [employer]    Ordering/Referring Provider:     MUNA POWELL      (If ordering provider performs procedure, schedule with ordering provider unless otherwise instructed. )    BMI: Estimated body mass index is 38.22 kg/m  as calculated from the following:    Height as of 7/28/23: 1.67 m (5' 5.75\").    Weight as of 7/28/23: 106.6 kg (235 lb).     Sedation Ordered  moderate sedation.   If patient BMI > 50 do not schedule in [facility].    If patient BMI > 45 do not schedule at [facility].    Are you taking methadone or Suboxone?  No    Have you had difficulties, pain, or discomfort during past endoscopy procedures?  No    Are you taking any prescription medications for pain 3 or more times per week?   NO, No RN review required.    Do you have a history of malignant hyperthermia?  No    (Females) Are you currently pregnant?        Have you been diagnosed or told you have pulmonary hypertension?   No    Do you have an LVAD?  No    Have you been told you have moderate to severe sleep apnea?  No    Have you been told you have COPD, asthma, or any other lung disease?  No    Do you have any heart conditions?  No     Have you ever had or are you waiting for an organ transplant?  No. Continue scheduling, no site restrictions.    Have you had a stroke or transient ischemic attack (TIA aka \"mini stroke\" in the last 6 months?   No    Have you been diagnosed with or been told you have cirrhosis of the liver?   No    Are you currently on dialysis?   No    Do you need assistance transferring?   No    BMI: Estimated body mass index is 38.22 kg/m  as calculated from the following:    Height as of 7/28/23: 1.67 m (5' 5.75\").    Weight as of 7/28/23: 106.6 kg (235 lb).     Is patients BMI > 40 and scheduling location " [facility]?  No    Do you take an injectable medication for weight loss or diabetes (excluding insulin)?  Yes, hold time can be up to 7 days. Please consult with you prescribing provider to discuss endoscopy recommendations.     Do you take the medication Naltrexone?  No    Do you take blood thinners?  No       Prep   Are you currently on dialysis or do you have chronic kidney disease?  No    Do you have a diagnosis of diabetes?  No    Do you have a diagnosis of cystic fibrosis (CF)?  No    On a regular basis do you go 3 -5 days between bowel movements?  No    BMI > 40?  No    Preferred Pharmacy:    [facility] #[id number] - [place] - [address]  [address]  [place] [address]  Phone: [phone] Fax: [phone]      Final Scheduling Details     Procedure scheduled  Colonoscopy    Surgeon:       Date of procedure: WANTED [place] WE ARE TO FAR PER PT       Pre-OP / PAC:   No - Not required for this site.    Location  [facility] - [facility] - Patient preference.    Sedation   Moderate Sedation - Per order.      Patient Reminders:   You will receive a call from a Nurse to review instructions and health history.  This assessment must be completed prior to your procedure.  Failure to complete the Nurse assessment may result in the procedure being cancelled.      On the day of your procedure, please designate an adult(s) who can drive you home stay with you for the next 24 hours. The medicines used in the exam will make you sleepy. You will not be able to drive.      You cannot take public transportation, ride share services, or non-medical taxi service without a responsible caregiver.  Medical transport services are allowed with the requirement that a responsible caregiver will receive you at your destination.  We require that drivers and caregivers are confirmed prior to your procedure.

## 2024-07-23 ENCOUNTER — PATIENT OUTREACH (OUTPATIENT)
Dept: CARE COORDINATION | Facility: CLINIC | Age: 59
End: 2024-07-23
Payer: COMMERCIAL

## 2024-07-29 ENCOUNTER — OFFICE VISIT (OUTPATIENT)
Dept: FAMILY MEDICINE | Facility: CLINIC | Age: 59
End: 2024-07-29
Payer: COMMERCIAL

## 2024-07-29 VITALS
HEIGHT: 66 IN | TEMPERATURE: 98.4 F | SYSTOLIC BLOOD PRESSURE: 124 MMHG | BODY MASS INDEX: 33.43 KG/M2 | WEIGHT: 208 LBS | DIASTOLIC BLOOD PRESSURE: 72 MMHG | OXYGEN SATURATION: 98 % | HEART RATE: 83 BPM

## 2024-07-29 DIAGNOSIS — B96.89 BV (BACTERIAL VAGINOSIS): ICD-10-CM

## 2024-07-29 DIAGNOSIS — N95.1 VAGINAL DRYNESS, MENOPAUSAL: ICD-10-CM

## 2024-07-29 DIAGNOSIS — N76.0 BV (BACTERIAL VAGINOSIS): ICD-10-CM

## 2024-07-29 DIAGNOSIS — R10.2 VAGINAL PAIN: Primary | ICD-10-CM

## 2024-07-29 DIAGNOSIS — G47.62 NOCTURNAL LEG CRAMPS: ICD-10-CM

## 2024-07-29 LAB
CLUE CELLS: PRESENT
TRICHOMONAS, WET PREP: ABNORMAL
WBC'S/HIGH POWER FIELD, WET PREP: ABNORMAL
YEAST, WET PREP: ABNORMAL

## 2024-07-29 PROCEDURE — 99214 OFFICE O/P EST MOD 30 MIN: CPT | Performed by: PHYSICIAN ASSISTANT

## 2024-07-29 PROCEDURE — 87210 SMEAR WET MOUNT SALINE/INK: CPT | Performed by: PHYSICIAN ASSISTANT

## 2024-07-29 RX ORDER — CARISOPRODOL 350 MG/1
350 TABLET ORAL
Qty: 30 TABLET | Refills: 1 | Status: SHIPPED | OUTPATIENT
Start: 2024-07-29 | End: 2024-09-27

## 2024-07-29 RX ORDER — METRONIDAZOLE 7.5 MG/G
1 GEL VAGINAL DAILY
Qty: 25 G | Refills: 0 | Status: SHIPPED | OUTPATIENT
Start: 2024-07-29 | End: 2024-08-03

## 2024-07-29 RX ORDER — SEMAGLUTIDE 2.68 MG/ML
INJECTION, SOLUTION SUBCUTANEOUS
COMMUNITY
Start: 2023-10-02

## 2024-07-29 NOTE — PROGRESS NOTES
"  Assessment & Plan     (R10.2) Vaginal pain  (primary encounter diagnosis)  Comment: BV noted on wet prep but I suspect based on exam and history that vaginal dryness is still a contributing factor. Discussed trial of premarin and risk profile.   Plan: Wet prep - Clinic Collect            (G47.62) Nocturnal leg cramps  Comment: reviewed my chart message sent last fall about tiered approach to treatment. She never did try soma so will see if that helps at all  Plan: carisoprodol (SOMA) 350 MG tablet            (N95.1) Vaginal dryness, menopausal  Comment:   Plan: conjugated estrogens (PREMARIN) 0.625 MG/GM         vaginal cream            (N76.0,  B96.89) BV (bacterial vaginosis)  Comment:   Plan: metroNIDAZOLE (METROGEL) 0.75 % vaginal gel                      Nicotine/Tobacco Cessation  She reports that she has been smoking cigarettes. She has a 12.5 pack-year smoking history. She has never used smokeless tobacco.    BMI  Estimated body mass index is 33.83 kg/m  as calculated from the following:    Height as of this encounter: 1.67 m (5' 5.75\").    Weight as of this encounter: 94.3 kg (208 lb).     Renetta Ceja is a 59 year old, presenting for the following health issues:  Vaginal Pain        7/29/2024     9:55 AM   Additional Questions   Roomed by ELIZABETH Martinez     History of Present Illness       Reason for visit:  Vaginal pain  Symptom onset:  3-4 weeks ago  Symptoms include:  Pain during intercourse  Symptom intensity:  Moderate  Symptom progression:  Staying the same  Had these symptoms before:  No    She eats 2-3 servings of fruits and vegetables daily.She consumes 0 sweetened beverage(s) daily.She exercises with enough effort to increase her heart rate 9 or less minutes per day.  She exercises with enough effort to increase her heart rate 3 or less days per week.   She is taking medications regularly.     -She has been having really bad leg cramps again.   Believes it's related to her back as it seems to " "ebb and flow with her back symptoms  Surgeon for her back tried her on gabapentin and multiple different medications - none of which worked for the leg cramps  She really doesn't like to take anything at all but can't sleep and can't deal with the pain  Cramping will be so severe at night it will wake  her up and then she won't be able to walk for the next 2 days because the calves will be so sore    Does not feel like she has a vaginal infection but it is painful and feels like things are 'tearing' down there  Never tried the premarin  Was not able to quit smoking completely and got nervous to take it      Review of Systems  Remainder of ROS obtained and found to be negative other than that which was documented above        Objective    /72   Pulse 83   Temp 98.4  F (36.9  C) (Tympanic)   Ht 1.67 m (5' 5.75\")   Wt 94.3 kg (208 lb)   LMP  (LMP Unknown)   SpO2 98%   BMI 33.83 kg/m    Body mass index is 33.83 kg/m .  Physical Exam   GENERAL: alert and no distress   (female): vaginal mucosa is raw and erythematous around the vaginal opening    Diagnostic Tests:   Wet prep: clue cells noted        Signed Electronically by: Modesta Bruno PA-C    "

## 2024-07-29 NOTE — PATIENT INSTRUCTIONS
This was from the my chart message last fall re: leg cramps. I know you do some of these things already so they are written in a tiered approach starting with more conservative (#1) and then moving to #3 until symptoms improve      First things (other than the exercise, hot showers or warm compresses before bed that we already discussed) -     A vitamin B complex that contains at least 30mg of B6 taken three times daily      And/or      Vitamin E 800 units taken at bedtime     These are both very safe and very little to no side effects        2.  Second tier: Benadryl (diphenhydramine) at bedtime     3.  Third tier would be to try either a diltiazem (low dose blood pressure medication) or the muscle relaxants (SOMA) that I had mentioned before. I did send a prescription for SOMA to the pharmacy which would only be taken at bedtime but know this can have some sedating properties so you would need to monitor for grogginess the next morning when you woke up

## 2024-08-16 ENCOUNTER — OFFICE VISIT (OUTPATIENT)
Dept: FAMILY MEDICINE | Facility: CLINIC | Age: 59
End: 2024-08-16
Payer: COMMERCIAL

## 2024-08-16 VITALS
TEMPERATURE: 98.7 F | BODY MASS INDEX: 31.98 KG/M2 | SYSTOLIC BLOOD PRESSURE: 124 MMHG | DIASTOLIC BLOOD PRESSURE: 70 MMHG | HEIGHT: 68 IN | OXYGEN SATURATION: 98 % | WEIGHT: 211 LBS | HEART RATE: 81 BPM

## 2024-08-16 DIAGNOSIS — E04.9 GOITER: ICD-10-CM

## 2024-08-16 DIAGNOSIS — Z01.818 PREOP GENERAL PHYSICAL EXAM: Primary | ICD-10-CM

## 2024-08-16 DIAGNOSIS — H26.9 CATARACT OF BOTH EYES, UNSPECIFIED CATARACT TYPE: ICD-10-CM

## 2024-08-16 LAB — TSH SERPL DL<=0.005 MIU/L-ACNC: 2.97 UIU/ML (ref 0.3–4.2)

## 2024-08-16 PROCEDURE — 84443 ASSAY THYROID STIM HORMONE: CPT | Performed by: PHYSICIAN ASSISTANT

## 2024-08-16 PROCEDURE — 36415 COLL VENOUS BLD VENIPUNCTURE: CPT | Performed by: PHYSICIAN ASSISTANT

## 2024-08-16 PROCEDURE — 99213 OFFICE O/P EST LOW 20 MIN: CPT | Performed by: PHYSICIAN ASSISTANT

## 2024-08-16 NOTE — PROGRESS NOTES
Preoperative Evaluation  [facility]  [address] [facility] [address]  Phone: [phone]  Primary Provider: Maru Emerson, DO  Pre-op Performing Provider: Modesta Bruno PA-C  Aug 16, 2024         8/12/2024   Surgical Information   What procedure is being done? Cataract Surgery   Facility or Hospital where procedure/surgery will be performed: [facility]   Who is doing the procedure / surgery? Dr. Sourav Galicia   Date of surgery / procedure: 8/29/2024 and 9/1/2024   Time of surgery / procedure: I dont have a time yet.   Where do you plan to recover after surgery? at home with family        Fax number for surgical facility: [phone]    Assessment & Plan     The proposed surgical procedure is considered LOW risk.      ICD-10-CM    1. Preop general physical exam  Z01.818       2. Cataract of both eyes, unspecified cataract type  H26.9       3. Goiter  E04.9 TSH with free T4 reflex     TSH with free T4 reflex             - No identified additional risk factors other than previously addressed    Antiplatelet or Anticoagulation Medication Instructions   - Patient is on no antiplatelet or anticoagulation medications.    Additional Medication Instructions  Take all scheduled medications on the day of surgery    Recommendation  Approval given to proceed with proposed procedure, without further diagnostic evaluation.    Renetta Ceja is a 59 year old, presenting for the following:  Pre-Op Exam          8/16/2024    10:21 AM   Additional Questions   Roomed by ELIZABETH Martinez related to upcoming procedure: bilateral cataracts        8/12/2024   Pre-Op Questionnaire   Have you ever had a heart attack or stroke? No   Have you ever had surgery on your heart or blood vessels, such as a stent placement, a coronary artery bypass, or surgery on an artery in your head, neck, heart, or legs? No   Do you have chest pain with activity? No   Do you have a history of heart failure? No    Do you currently have a cold, bronchitis or symptoms of other infection? No   Do you have a cough, shortness of breath, or wheezing? No   Do you or anyone in your family have previous history of blood clots? No   Do you or does anyone in your family have a serious bleeding problem such as prolonged bleeding following surgeries or cuts? No   Have you ever had problems with anemia or been told to take iron pills? No   Have you had any abnormal blood loss such as black, tarry or bloody stools, or abnormal vaginal bleeding? No   Have you ever had a blood transfusion? No   Are you willing to have a blood transfusion if it is medically needed before, during, or after your surgery? Yes   Have you or any of your relatives ever had problems with anesthesia? No   Do you have sleep apnea, excessive snoring or daytime drowsiness? No   Do you have any artifical heart valves or other implanted medical devices like a pacemaker, defibrillator, or continuous glucose monitor? No   Do you have artificial joints? (!) YES   Are you allergic to latex? (!) YES        Health Care Directive  Patient does not have a Health Care Directive or Living Will:     Preoperative Review of    reviewed - no record of controlled substances prescribed.      Status of Chronic Conditions:  See problem list for active medical problems.  Problems all longstanding and stable, except as noted/documented.  See ROS for pertinent symptoms related to these conditions.    Patient Active Problem List    Diagnosis Date Noted    Spinal stenosis of lumbar region with neurogenic claudication 05/19/2022     Priority: Medium    Tobacco dependence 05/19/2022     Priority: Medium    Low calcium levels 07/06/2021     Priority: Medium    Hiatal hernia 07/06/2021     Priority: Medium    Irritable bowel syndrome with both constipation and diarrhea 10/10/2020     Priority: Medium    Benign neoplasm of ascending colon 07/27/2020     Priority: Medium    History of colonic  polyps 07/23/2020     Priority: Medium    Thyroid Diffuse Enlargement      Priority: Medium    Nontoxic Solitary Thyroid Nodule      Priority: Medium    Joint Pain, Localized In The Knee      Priority: Medium    Obesity      Priority: Medium      Past Medical History:   Diagnosis Date    Cystic acne     Elevated blood pressure reading without diagnosis of hypertension 09/25/2019    Jaw pain     Localized pain of knee joint     Obesity      Past Surgical History:   Procedure Laterality Date    COLONOSCOPY  6/2020    DECOMPRESSION LUMBAR ONE LEVEL N/A 11/22/2022    Procedure: Open lumbar decompression, L3-L4 ;  Surgeon: Roel Holley MD;  Location: [facility] OR    DECOMPRESSION LUMBAR TWO LEVELS N/A 5/24/2022    Procedure: Lumbar 2-Lumbar 3, Lumbar 3-Lumbar 4, Lumbar Decompression;  Surgeon: Roel Holley MD;  Location: [facility] OR    HC KNEE SCOPE, DIAGNOSTIC      Description: Arthroscopy Knee Right;  Recorded: 11/21/2012;    HC SHLDR ARTHROSCOP,SURG,W/REMOVAL,LOOSE/FB      Description: Shoulder Arthroscopy;  Recorded: 11/21/2012;    JOINT REPLACEMENT      THYROIDECTOMY, PARTIAL Left 12/17/2019    Procedure: LEFT THYROID LOBECTOMY;  Surgeon: Santosh Doe MD;  Location: [facility];  Service: ENT    TRANSPLANT  2013    Knee Replacement     Current Outpatient Medications   Medication Sig Dispense Refill    carisoprodol (SOMA) 350 MG tablet Take 1 tablet (350 mg) by mouth nightly as needed (muscle cramps) 30 tablet 1    conjugated estrogens (PREMARIN) 0.625 MG/GM vaginal cream Place 0.5 g vaginally twice a week 30 g 1    levothyroxine (SYNTHROID/LEVOTHROID) 25 MCG tablet Take 1 tablet (25 mcg) by mouth daily DUE FOR LABS 90 tablet 0    OZEMPIC, 2 MG/DOSE, 8 MG/3ML pen          Allergies   Allergen Reactions    Gadolinium-Containing Contrast Media [Gadolinium Derivatives] Anaphylaxis and Itching    Diatrizoate Meglumine [Diatrizoate] Itching     5 minutes post CT injection, patient noted her eyes  "and ears were extremely itchy    Esomeprazole Sodium [Esomeprazole] Unknown     Swelling    Gluten [Gluten Meal] Itching    Hydrocodone-Acetaminophen Unknown     Hyperactivity, itching    Latex Blisters    Omeprazole Unknown     Swelling- ankles        Social History     Tobacco Use    Smoking status: Every Day     Current packs/day: 0.50     Average packs/day: 0.5 packs/day for 25.0 years (12.5 ttl pk-yrs)     Types: Cigarettes    Smokeless tobacco: Never    Tobacco comments:     Have stopped off and on   Substance Use Topics    Alcohol use: Yes     Comment: 10 drinks per week     Family History   Problem Relation Age of Onset    Colon Cancer Father 78    Hypertension Father     Heart Disease Other     Cerebrovascular Disease Other      History   Drug Use Unknown             Review of Systems  CONSTITUTIONAL: NEGATIVE for fever, chills, change in weight  INTEGUMENTARY/SKIN: NEGATIVE for worrisome rashes, moles or lesions  EYES: NEGATIVE for vision changes or irritation  ENT/MOUTH: NEGATIVE for ear, mouth and throat problems  RESP: NEGATIVE for significant cough or SOB  BREAST: NEGATIVE for masses, tenderness or discharge  CV: NEGATIVE for chest pain, palpitations or peripheral edema  GI: NEGATIVE for nausea, abdominal pain, heartburn, or change in bowel habits  : NEGATIVE for frequency, dysuria, or hematuria  MUSCULOSKELETAL: NEGATIVE for significant arthralgias or myalgia  NEURO: NEGATIVE for weakness, dizziness or paresthesias  ENDOCRINE: NEGATIVE for temperature intolerance, skin/hair changes  HEME: NEGATIVE for bleeding problems  PSYCHIATRIC: NEGATIVE for changes in mood or affect    Objective    /70   Pulse 81   Temp 98.7  F (37.1  C) (Tympanic)   Wt 95.7 kg (211 lb)   LMP  (LMP Unknown)   SpO2 98%   BMI 34.32 kg/m     Estimated body mass index is 34.32 kg/m  as calculated from the following:    Height as of 7/29/24: 1.67 m (5' 5.75\").    Weight as of this encounter: 95.7 kg (211 lb).  Physical " "Exam  GENERAL: alert and no distress  EYES: Eyes grossly normal to inspection, PERRL and conjunctivae and sclerae normal  HENT: ear canals and TM's normal, nose and mouth without ulcers or lesions  NECK: no adenopathy, no asymmetry, masses, or scars  RESP: lungs clear to auscultation - no rales, rhonchi or wheezes  CV: regular rate and rhythm, normal S1 S2, no S3 or S4, no murmur, click or rub, no peripheral edema  ABDOMEN: soft, nontender, no hepatosplenomegaly, no masses and bowel sounds normal  MS: no gross musculoskeletal defects noted, no edema  SKIN: no suspicious lesions or rashes  NEURO: Normal strength and tone, mentation intact and speech normal  PSYCH: mentation appears normal, affect normal/bright    No results for input(s): \"HGB\", \"PLT\", \"INR\", \"NA\", \"POTASSIUM\", \"CR\", \"A1C\" in the last 8760 hours.     Diagnostics  No labs were ordered during this visit.   No EKG required for low risk surgery (cataract, skin procedure, breast biopsy, etc).    Revised Cardiac Risk Index (RCRI)  The patient has the following serious cardiovascular risks for perioperative complications:   - No serious cardiac risks = 0 points     RCRI Interpretation: 0 points: Class I (very low risk - 0.4% complication rate)         Signed Electronically by: Modesta Bruno PA-C  A copy of this evaluation report is provided to the requesting physician.      "

## 2024-08-16 NOTE — PATIENT INSTRUCTIONS
Patient Education   Preparing for Your Surgery  Getting started  A nurse will call you to review your health history and instructions. They will give you an arrival time based on your scheduled surgery time. Please be ready to share:  Your doctor's clinic name and phone number  Your medical, surgical, and anesthesia history  A list of allergies and sensitivities  A list of medicines, including herbal treatments and over-the-counter drugs  Whether the patient has a legal guardian (ask how to send us the papers in advance)  Please tell us if you're pregnant--or if there's any chance you might be pregnant. Some surgeries may injure a fetus (unborn baby), so they require a pregnancy test. Surgeries that are safe for a fetus don't always need a test, and you can choose whether to have one.   If you have a child who's having surgery, please ask for a copy of Preparing for Your Child's Surgery.    Preparing for surgery  Within 10 to 30 days of surgery: Have a pre-op exam (sometimes called an H&P, or History and Physical). This can be done at a clinic or pre-operative center.  If you're having a , you may not need this exam. Talk to your care team.  At your pre-op exam, talk to your care team about all medicines you take. If you need to stop any medicines before surgery, ask when to start taking them again.  We do this for your safety. Many medicines can make you bleed too much during surgery. Some change how well surgery (anesthesia) drugs work.  Call your insurance company to let them know you're having surgery. (If you don't have insurance, call [phone].)  Call your clinic if there's any change in your health. This includes signs of a cold or flu (sore throat, runny nose, cough, rash, fever). It also includes a scrape or scratch near the surgery site.  If you have questions on the day of surgery, call your hospital or surgery center.  Eating and drinking guidelines  For your safety: Unless your surgeon  tells you otherwise, follow the guidelines below.  Eat and drink as usual until 8 hours before you arrive for surgery. After that, no food or milk.  Drink clear liquids until 2 hours before you arrive. These are liquids you can see through, like water, Gatorade, and Propel Water. They also include plain black coffee and tea (no cream or milk), candy, and breath mints. You can spit out gum when you arrive.  If you drink alcohol: Stop drinking it the night before surgery.  If your care team tells you to take medicine on the morning of surgery, it's okay to take it with a sip of water.  Preventing infection  Shower or bathe the night before and morning of your surgery. Follow the instructions your clinic gave you. (If no instructions, use regular soap.)  Don't shave or clip hair near your surgery site. We'll remove the hair if needed.  Don't smoke or vape the morning of surgery. You may chew nicotine gum up to 2 hours before surgery. A nicotine patch is okay.  Note: Some surgeries require you to completely quit smoking and nicotine. Check with your surgeon.  Your care team will make every effort to keep you safe from infection. We will:  Clean our hands often with soap and water (or an alcohol-based hand rub).  Clean the skin at your surgery site with a special soap that kills germs.  Give you a special gown to keep you warm. (Cold raises the risk of infection.)  Wear special hair covers, masks, gowns and gloves during surgery.  Give antibiotic medicine, if prescribed. Not all surgeries need antibiotics.  What to bring on the day of surgery  Photo ID and insurance card  Copy of your health care directive, if you have one  Glasses and hearing aids (bring cases)  You can't wear contacts during surgery  Inhaler and eye drops, if you use them (tell us about these when you arrive)  CPAP machine or breathing device, if you use them  A few personal items, if spending the night  If you have . . .  A pacemaker, ICD (cardiac  defibrillator) or other implant: Bring the ID card.  An implanted stimulator: Bring the remote control.  A legal guardian: Bring a copy of the certified (court-stamped) guardianship papers.  Please remove any jewelry, including body piercings. Leave jewelry and other valuables at home.  If you're going home the day of surgery  You must have a responsible adult drive you home. They should stay with you overnight as well.  If you don't have someone to stay with you, and you aren't safe to go home alone, we may keep you overnight. Insurance often won't pay for this.  After surgery  If it's hard to control your pain or you need more pain medicine, please call your surgeon's office.  Questions?   If you have any questions for your care team, list them here: _________________________________________________________________________________________________________________________________________________________________________ ____________________________________ ____________________________________ ____________________________________  For informational purposes only. Not to replace the advice of your health care provider. Copyright   2003, 2019 [facility]. All rights reserved. Clinically reviewed by Irasema Ayers MD. [employer] [id number] - REV 12/22.

## 2024-09-20 ENCOUNTER — ANCILLARY PROCEDURE (OUTPATIENT)
Dept: MAMMOGRAPHY | Facility: HOSPITAL | Age: 59
End: 2024-09-20
Attending: FAMILY MEDICINE
Payer: COMMERCIAL

## 2024-09-20 DIAGNOSIS — Z12.31 VISIT FOR SCREENING MAMMOGRAM: ICD-10-CM

## 2024-09-20 PROCEDURE — 77063 BREAST TOMOSYNTHESIS BI: CPT

## 2024-09-27 ENCOUNTER — OFFICE VISIT (OUTPATIENT)
Dept: FAMILY MEDICINE | Facility: CLINIC | Age: 59
End: 2024-09-27
Payer: COMMERCIAL

## 2024-09-27 VITALS
BODY MASS INDEX: 32.17 KG/M2 | TEMPERATURE: 98.4 F | HEART RATE: 79 BPM | OXYGEN SATURATION: 98 % | DIASTOLIC BLOOD PRESSURE: 76 MMHG | SYSTOLIC BLOOD PRESSURE: 130 MMHG | WEIGHT: 210 LBS

## 2024-09-27 DIAGNOSIS — R25.2 NOCTURNAL MUSCLE CRAMPS: ICD-10-CM

## 2024-09-27 DIAGNOSIS — H26.9 CATARACT OF BOTH EYES, UNSPECIFIED CATARACT TYPE: ICD-10-CM

## 2024-09-27 DIAGNOSIS — Z01.818 PREOP GENERAL PHYSICAL EXAM: Primary | ICD-10-CM

## 2024-09-27 LAB
ALBUMIN SERPL BCG-MCNC: 4.1 G/DL (ref 3.5–5.2)
ALP SERPL-CCNC: 85 U/L (ref 40–150)
ALT SERPL W P-5'-P-CCNC: 63 U/L (ref 0–50)
ANION GAP SERPL CALCULATED.3IONS-SCNC: 10 MMOL/L (ref 7–15)
AST SERPL W P-5'-P-CCNC: 46 U/L (ref 0–45)
BILIRUB SERPL-MCNC: 0.7 MG/DL
BUN SERPL-MCNC: 19.5 MG/DL (ref 8–23)
CALCIUM SERPL-MCNC: 8.8 MG/DL (ref 8.8–10.4)
CHLORIDE SERPL-SCNC: 105 MMOL/L (ref 98–107)
CREAT SERPL-MCNC: 0.94 MG/DL (ref 0.51–0.95)
EGFRCR SERPLBLD CKD-EPI 2021: 69 ML/MIN/1.73M2
GLUCOSE SERPL-MCNC: 80 MG/DL (ref 70–99)
HCO3 SERPL-SCNC: 26 MMOL/L (ref 22–29)
MAGNESIUM SERPL-MCNC: 2.2 MG/DL (ref 1.7–2.3)
POTASSIUM SERPL-SCNC: 4.1 MMOL/L (ref 3.4–5.3)
PROT SERPL-MCNC: 6.8 G/DL (ref 6.4–8.3)
SODIUM SERPL-SCNC: 141 MMOL/L (ref 135–145)

## 2024-09-27 PROCEDURE — 83735 ASSAY OF MAGNESIUM: CPT | Performed by: PHYSICIAN ASSISTANT

## 2024-09-27 PROCEDURE — 99214 OFFICE O/P EST MOD 30 MIN: CPT | Mod: 25 | Performed by: PHYSICIAN ASSISTANT

## 2024-09-27 PROCEDURE — 90471 IMMUNIZATION ADMIN: CPT | Performed by: PHYSICIAN ASSISTANT

## 2024-09-27 PROCEDURE — 91320 SARSCV2 VAC 30MCG TRS-SUC IM: CPT | Performed by: PHYSICIAN ASSISTANT

## 2024-09-27 PROCEDURE — 36415 COLL VENOUS BLD VENIPUNCTURE: CPT | Performed by: PHYSICIAN ASSISTANT

## 2024-09-27 PROCEDURE — 90480 ADMN SARSCOV2 VAC 1/ONLY CMP: CPT | Performed by: PHYSICIAN ASSISTANT

## 2024-09-27 PROCEDURE — 90673 RIV3 VACCINE NO PRESERV IM: CPT | Performed by: PHYSICIAN ASSISTANT

## 2024-09-27 PROCEDURE — 80053 COMPREHEN METABOLIC PANEL: CPT | Performed by: PHYSICIAN ASSISTANT

## 2024-09-27 NOTE — PROGRESS NOTES
Preoperative Evaluation  [facility]  [address]  [facility] [address]  Phone: [phone]  Primary Provider: Maru Emerson, DO  Pre-op Performing Provider: Modesta Bruno PA-C  Sep 27, 2024         9/23/2024   Surgical Information   What procedure is being done? Pre op for cataract surgery.   Facility or Hospital where procedure/surgery will be performed: [facility]   Who is doing the procedure / surgery? Dr Guerrero   Date of surgery / procedure: October 7   Time of surgery / procedure: TBD   Where do you plan to recover after surgery? at home with family        Fax number for surgical facility: [phone]    Assessment & Plan     The proposed surgical procedure is considered LOW risk.    (Z01.818) Preop general physical exam  (primary encounter diagnosis)  Comment:   Plan:     (H26.9) Cataract of both eyes, unspecified cataract type  Comment:   Plan:     (R25.2) Nocturnal muscle cramps  Comment: becoming more bothersome and impacting sleep. Will check labs. If normal, consider medication   Plan: Comprehensive metabolic panel (BMP + Alb, Alk         Phos, ALT, AST, Total. Bili, TP), Magnesium                        - No identified additional risk factors other than previously addressed    Antiplatelet or Anticoagulation Medication Instructions   - Patient is on no antiplatelet or anticoagulation medications.    Additional Medication Instructions  Patient is on no additional chronic medications    Recommendation  Approval given to proceed with proposed procedure, without further diagnostic evaluation.    Renetta Ceja is a 59 year old, presenting for the following:  Pre-Op Exam          9/27/2024    10:27 AM   Additional Questions   Roomed by ELIZABETH Martinez     HPI related to upcoming procedure: cataracts - last scheduled procedure postponed due to death of her mother        9/23/2024   Pre-Op Questionnaire   Have you ever had a heart attack or stroke? No   Have you  ever had surgery on your heart or blood vessels, such as a stent placement, a coronary artery bypass, or surgery on an artery in your head, neck, heart, or legs? No   Do you have chest pain with activity? No   Do you have a history of heart failure? No   Do you currently have a cold, bronchitis or symptoms of other infection? No   Do you have a cough, shortness of breath, or wheezing? No   Do you or anyone in your family have previous history of blood clots? No   Do you or does anyone in your family have a serious bleeding problem such as prolonged bleeding following surgeries or cuts? No   Have you ever had problems with anemia or been told to take iron pills? No   Have you had any abnormal blood loss such as black, tarry or bloody stools, or abnormal vaginal bleeding? No   Have you ever had a blood transfusion? No   Are you willing to have a blood transfusion if it is medically needed before, during, or after your surgery? Yes   Have you or any of your relatives ever had problems with anesthesia? No   Do you have sleep apnea, excessive snoring or daytime drowsiness? No   Do you have any artifical heart valves or other implanted medical devices like a pacemaker, defibrillator, or continuous glucose monitor? No   Do you have artificial joints? (!) YES   Are you allergic to latex? (!) YES        Health Care Directive  Patient does not have a Health Care Directive or Living Will:     Preoperative Review of    reviewed - no record of controlled substances prescribed.      Status of Chronic Conditions:  See problem list for active medical problems.  Problems all longstanding and stable, except as noted/documented.  See ROS for pertinent symptoms related to these conditions.    Patient Active Problem List    Diagnosis Date Noted    Spinal stenosis of lumbar region with neurogenic claudication 05/19/2022     Priority: Medium    Tobacco dependence 05/19/2022     Priority: Medium    Low calcium levels 07/06/2021      Priority: Medium    Hiatal hernia 07/06/2021     Priority: Medium    Irritable bowel syndrome with both constipation and diarrhea 10/10/2020     Priority: Medium    Benign neoplasm of ascending colon 07/27/2020     Priority: Medium    History of colonic polyps 07/23/2020     Priority: Medium    Thyroid Diffuse Enlargement      Priority: Medium    Nontoxic Solitary Thyroid Nodule      Priority: Medium    Joint Pain, Localized In The Knee      Priority: Medium    Obesity      Priority: Medium      Past Medical History:   Diagnosis Date    Cystic acne     Elevated blood pressure reading without diagnosis of hypertension 09/25/2019    Jaw pain     Localized pain of knee joint     Obesity      Past Surgical History:   Procedure Laterality Date    COLONOSCOPY  6/2020    DECOMPRESSION LUMBAR ONE LEVEL N/A 11/22/2022    Procedure: Open lumbar decompression, L3-L4 ;  Surgeon: Roel Holley MD;  Location: [facility] OR    DECOMPRESSION LUMBAR TWO LEVELS N/A 5/24/2022    Procedure: Lumbar 2-Lumbar 3, Lumbar 3-Lumbar 4, Lumbar Decompression;  Surgeon: Roel Holley MD;  Location:  OR     KNEE SCOPE, DIAGNOSTIC      Description: Arthroscopy Knee Right;  Recorded: 11/21/2012;    [facility] SHLDR ARTHROSCOP,SURG,W/REMOVAL,LOOSE/FB      Description: Shoulder Arthroscopy;  Recorded: 11/21/2012;    JOINT REPLACEMENT      THYROIDECTOMY, PARTIAL Left 12/17/2019    Procedure: LEFT THYROID LOBECTOMY;  Surgeon: Santosh Doe MD;  Location: [facility];  Service: ENT    TRANSPLANT  2013    Knee Replacement     Current Outpatient Medications   Medication Sig Dispense Refill    conjugated estrogens (PREMARIN) 0.625 MG/GM vaginal cream Place 0.5 g vaginally twice a week 30 g 1    levothyroxine (SYNTHROID/LEVOTHROID) 25 MCG tablet Take 1 tablet (25 mcg) by mouth daily DUE FOR LABS 90 tablet 0    OZEMPIC, 2 MG/DOSE, 8 MG/3ML pen       carisoprodol (SOMA) 350 MG tablet Take 1 tablet (350 mg) by mouth nightly as needed  (muscle cramps) (Patient not taking: Reported on 9/27/2024) 30 tablet 1       Allergies   Allergen Reactions    Gadolinium-Containing Contrast Media [Gadolinium Derivatives] Anaphylaxis and Itching    Diatrizoate Meglumine [Diatrizoate] Itching     5 minutes post CT injection, patient noted her eyes and ears were extremely itchy    Esomeprazole Sodium [Esomeprazole] Unknown     Swelling    Gluten [Gluten Meal] Itching    Hydrocodone-Acetaminophen Unknown     Hyperactivity, itching    Latex Blisters    Omeprazole Unknown     Swelling- ankles        Social History     Tobacco Use    Smoking status: Every Day     Current packs/day: 0.50     Average packs/day: 0.5 packs/day for 25.0 years (12.5 ttl pk-yrs)     Types: Cigarettes    Smokeless tobacco: Never    Tobacco comments:     Have stopped off and on   Substance Use Topics    Alcohol use: Yes     Comment: 10 drinks per week     Family History   Problem Relation Age of Onset    Dementia Mother     Colon Cancer Father 78    Hypertension Father     Heart Disease Other     Cerebrovascular Disease Other      History   Drug Use Unknown             Review of Systems  CONSTITUTIONAL: NEGATIVE for fever, chills, change in weight  INTEGUMENTARY/SKIN: NEGATIVE for worrisome rashes, moles or lesions  EYES: NEGATIVE for vision changes or irritation  ENT/MOUTH: NEGATIVE for ear, mouth and throat problems  RESP: NEGATIVE for significant cough or SOB  BREAST: NEGATIVE for masses, tenderness or discharge  CV: NEGATIVE for chest pain, palpitations or peripheral edema  GI: NEGATIVE for nausea, abdominal pain, heartburn, or change in bowel habits  : NEGATIVE for frequency, dysuria, or hematuria  MUSCULOSKELETAL: NEGATIVE for significant arthralgias or myalgia  NEURO: NEGATIVE for weakness, dizziness or paresthesias  ENDOCRINE: NEGATIVE for temperature intolerance, skin/hair changes  HEME: NEGATIVE for bleeding problems  PSYCHIATRIC: NEGATIVE for changes in mood or affect    Objective   "  /76   Pulse 79   Temp 98.4  F (36.9  C) (Tympanic)   Wt 95.3 kg (210 lb)   LMP  (LMP Unknown)   SpO2 98%   BMI 32.17 kg/m     Estimated body mass index is 32.17 kg/m  as calculated from the following:    Height as of 8/16/24: 1.721 m (5' 7.75\").    Weight as of this encounter: 95.3 kg (210 lb).  Physical Exam  GENERAL: alert and no distress  EYES: Eyes grossly normal to inspection, PERRL and conjunctivae and sclerae normal  HENT: ear canals and TM's normal, nose and mouth without ulcers or lesions  NECK: no adenopathy, no asymmetry, masses, or scars  RESP: lungs clear to auscultation - no rales, rhonchi or wheezes  CV: regular rate and rhythm, normal S1 S2, no S3 or S4, no murmur, click or rub, no peripheral edema  ABDOMEN: soft, nontender, no hepatosplenomegaly, no masses and bowel sounds normal  MS: no gross musculoskeletal defects noted, no edema  SKIN: no suspicious lesions or rashes  NEURO: Normal strength and tone, mentation intact and speech normal  PSYCH: mentation appears normal, affect normal/bright    No results for input(s): \"HGB\", \"PLT\", \"INR\", \"NA\", \"POTASSIUM\", \"CR\", \"A1C\" in the last 8760 hours.     Diagnostics  Labs pending at this time.  Results will be reviewed when available.   No EKG required for low risk surgery (cataract, skin procedure, breast biopsy, etc).    Revised Cardiac Risk Index (RCRI)  The patient has the following serious cardiovascular risks for perioperative complications:   - No serious cardiac risks = 0 points     RCRI Interpretation: 0 points: Class I (very low risk - 0.4% complication rate)         Signed Electronically by: Modesta Bruno PA-C  A copy of this evaluation report is provided to the requesting physician.      "

## 2024-09-27 NOTE — PATIENT INSTRUCTIONS
Patient Education   Preparing for Your Surgery  For Adults  Getting started  In most cases, a nurse will call to review your health history and instructions. They will give you an arrival time based on your scheduled surgery time. Please be ready to share:  Your doctor's clinic name and phone number  Your medical, surgical, and anesthesia history  A list of allergies and sensitivities  A list of medicines, including herbal treatments and over-the-counter drugs  Whether the patient has a legal guardian (ask how to send us the papers in advance)  Note: You may not receive a call if you were seen at our PAC (Preoperative Assessment Center).  Please tell us if you're pregnant--or if there's any chance you might be pregnant. Some surgeries may injure a fetus (unborn baby), so they require a pregnancy test. Surgeries that are safe for a fetus don't always need a test, and you can choose whether to have one.   Preparing for surgery  Within 10 to 30 days of surgery: Have a pre-op exam (sometimes called an H&P, or History and Physical). This can be done at a clinic or pre-operative center.  If you're having a , you may not need this exam. Talk to your care team.  At your pre-op exam, talk to your care team about all medicines you take. (This includes CBD oil and any drugs, such as THC, marijuana, and other forms of cannabis.) If you need to stop any medicine before surgery, ask when to start taking it again.  This is for your safety. Many medicines and drugs can make you bleed too much during surgery. Some change how well surgery (anesthesia) drugs work.  Call your insurance company to let them know you're having surgery. (If you don't have insurance, call [phone].)  Call your clinic if there's any change in your health. This includes a scrape or scratch near the surgery site, or any signs of a cold (sore throat, runny nose, cough, rash, fever).  Eating and drinking guidelines  For your safety: Unless your  surgeon tells you otherwise, follow the guidelines below.  Eat and drink as normal until 8 hours before you arrive for surgery. After that, no food or milk. You can spit out gum when you arrive.  Drink clear liquids until 2 hours before you arrive. These are liquids you can see through, like water, Gatorade, and Propel Water. They also include plain black coffee and tea (no cream or milk).  No alcohol for 24 hours before you arrive. The night before surgery, stop any drinks that contain THC.  If your care team tells you to take medicine on the morning of surgery, it's okay to take it with a sip of water. No other medicines or drugs are allowed (including CBD oil)--follow your care team's instructions.  If you have questions the day of surgery, call your hospital or surgery center.   Preventing infection  Shower or bathe the night before and the morning of surgery. Follow the instructions your clinic gave you. (If no instructions, use regular soap.)  Don't shave or clip hair near your surgery site. We'll remove the hair if needed.  Don't smoke or vape the morning of surgery. No chewing tobacco for 6 hours before you arrive. A nicotine patch is okay. You may spit out nicotine gum when you arrive.  For some surgeries, the surgeon will tell you to fully quit smoking and nicotine.  We will make every effort to keep you safe from infection. We will:  Clean our hands often with soap and water (or an alcohol-based hand rub).  Clean the skin at your surgery site with a special soap that kills germs.  Give you a special gown to keep you warm. (Cold raises the risk of infection.)  Wear hair covers, masks, gowns, and gloves during surgery.  Give antibiotic medicine, if prescribed. Not all surgeries need this medicine.  What to bring on the day of surgery  Photo ID and insurance card  Copy of your health care directive, if you have one  Glasses and hearing aids (bring cases)  You can't wear contacts during surgery  Inhaler and  eye drops, if you use them (tell us about these when you arrive)  CPAP machine or breathing device, if you use them  A few personal items, if spending the night  If you have . . .  A pacemaker, ICD (cardiac defibrillator), or other implant: Bring the ID card.  An implanted stimulator: Bring the remote control.  A legal guardian: Bring a copy of the certified (court-stamped) guardianship papers.  Please remove any jewelry, including body piercings. Leave jewelry and other valuables at home.  If you're going home the day of surgery  You must have a responsible adult drive you home. They should stay with you overnight as well.  If you don't have someone to stay with you, and you aren't safe to go home alone, we may keep you overnight. Insurance often won't pay for this.  After surgery  If it's hard to control your pain or you need more pain medicine, please call your surgeon's office.  Questions?   If you have any questions for your care team, list them here:   ____________________________________________________________________________________________________________________________________________________________________________________________________________________________________________________________  For informational purposes only. Not to replace the advice of your health care provider. Copyright   2003, 2019 [employer]. All rights reserved. Clinically reviewed by Dominick Obrien MD. [employer] [id number] - REV 08/24.

## 2024-09-29 DIAGNOSIS — R74.01 TRANSAMINITIS: Primary | ICD-10-CM

## 2024-10-06 DIAGNOSIS — E89.0 H/O PARTIAL THYROIDECTOMY: ICD-10-CM

## 2024-10-07 NOTE — TELEPHONE ENCOUNTER
Pending Prescriptions:                       Disp   Refills    levothyroxine (SYNTHROID/LEVOTHROID) 25 MC*90 tab*2        Sig: TAKE 1 TABLET (25 MCG) BY MOUTH DAILY DUE FOR LABS    Routing refill request to provider for review/approval because:  Requested Prescriptions   Pending Prescriptions Disp Refills    levothyroxine (SYNTHROID/LEVOTHROID) 25 MCG tablet [Pharmacy Med Name: LEVOTHYROXINE 25 MCG TABLET] 90 tablet 2     Sig: TAKE 1 TABLET (25 MCG) BY MOUTH DAILY DUE FOR LABS       Thyroid Protocol Failed - 10/6/2024  4:58 AM        Failed - Medication indicated for associated diagnosis     Medication is associated with one or more of the following diagnoses:  Hypothyroidism  Thyroid stimulating hormone suppression therapy  Thyroid cancer  Acquired atrophy of thyroid          Passed - Patient is 12 years or older        Passed - Recent (12 mo) or future (30 days) visit within the authorizing provider's specialty     The patient must have completed an in-person or virtual visit within the past 12 months or has a future visit scheduled within the next 90 days with the authorizing provider s specialty.  Urgent care and e-visits do not quality as an office visit for this protocol.          Passed - Medication is active on med list        Passed - Normal TSH on file in past 12 months     Recent Labs   Lab Test 08/16/24  1046   TSH 2.97              Passed - No active pregnancy on record     If patient is pregnant or has had a positive pregnancy test, please check TSH.          Passed - No positive pregnancy test in past 12 months     If patient is pregnant or has had a positive pregnancy test, please check TSH.             Little Alegre RN  [facility]

## 2024-10-08 RX ORDER — LEVOTHYROXINE SODIUM 25 UG/1
25 TABLET ORAL DAILY
Qty: 90 TABLET | Refills: 3 | Status: SHIPPED | OUTPATIENT
Start: 2024-10-08

## 2024-10-25 ENCOUNTER — LAB (OUTPATIENT)
Dept: LAB | Facility: CLINIC | Age: 59
End: 2024-10-25
Payer: COMMERCIAL

## 2024-10-25 DIAGNOSIS — R74.01 TRANSAMINITIS: ICD-10-CM

## 2024-10-25 LAB
ALBUMIN SERPL BCG-MCNC: 4.2 G/DL (ref 3.5–5.2)
ALP SERPL-CCNC: 85 U/L (ref 40–150)
ALT SERPL W P-5'-P-CCNC: 41 U/L (ref 0–50)
AST SERPL W P-5'-P-CCNC: 31 U/L (ref 0–45)
BILIRUB DIRECT SERPL-MCNC: <0.2 MG/DL (ref 0–0.3)
BILIRUB SERPL-MCNC: 0.7 MG/DL
PROT SERPL-MCNC: 7.1 G/DL (ref 6.4–8.3)

## 2024-10-25 PROCEDURE — 36415 COLL VENOUS BLD VENIPUNCTURE: CPT

## 2024-10-25 PROCEDURE — 80076 HEPATIC FUNCTION PANEL: CPT

## 2024-10-26 ENCOUNTER — HEALTH MAINTENANCE LETTER (OUTPATIENT)
Age: 59
End: 2024-10-26

## 2025-01-06 ENCOUNTER — TELEPHONE (OUTPATIENT)
Dept: GASTROENTEROLOGY | Facility: CLINIC | Age: 60
End: 2025-01-06
Payer: COMMERCIAL

## 2025-01-06 NOTE — TELEPHONE ENCOUNTER
Endoscopy Scheduling Screen      What insurance is in the chart?  Other:  BCBS    Ordering/Referring Provider: Modesta Bruno PA-C    (If ordering provider performs procedure, schedule with ordering provider unless otherwise instructed. )    BMI: There is no height or weight on file to calculate BMI.     Sedation Ordered  general anesthesia.   BMI<= 45 45 < BMI <= 48 48 < BMI < = 50  BMI > 50   No Restrictions No [facility]  No [facility]  [facility] with exceptions Hospital Only OR Only       Do you have a history of malignant hyperthermia?  NO    (Females) Are you currently pregnant?   NO     Are you currently on dialysis?   NO    Do you need assistance transferring?   NO    BMI: There is no height or weight on file to calculate BMI.     Is patients BMI > 50?  NO    BMI > 40?  NO    Do you have a diagnosis of diabetes?  NO    Do you take an injectable medication for weight loss or diabetes (excluding insulin)?  Yes, hold time can be up to 7 days. Please consult with you prescribing provider to discuss endoscopy recommendations. (Please schedule at least 7 days out.)  OZEMPIC  Do you take the medication Naltrexone?  NO    Do you take blood thinners?  NO    Prep   Are you currently on dialysis or do you have chronic kidney disease?  NO    Do you have a diagnosis of cystic fibrosis (CF)?  NO    On a regular basis do you go 3 -5 days between bowel movements?  NO    Preferred Pharmacy:    [facility]/pharmacy #[id number] - CATHY MENENDEZ - [address] [place] [address] AT [address] & [facility]  [address] [place] [address]  SHON M[place]GAN [address]  Phone: [phone] Fax: [phone]      Final Scheduling Details     Procedure scheduled  Colonoscopy    Surgeon:  Lashon      Date of procedure:  3/21/25     Location  [place] - Per order.    What is your communication preference for Instructions and/or Bowel Prep?   [facility]    Patient Reminders:    You will receive a call from a Nurse to review instructions and health history.  This assessment must  be completed prior to your procedure.  Failure to complete the Nurse assessment may result in the procedure being cancelled.       On the day of your procedure, please designate an adult(s) who can drive you home stay with you for the next 24 hours. The medicines used in the exam will make you sleepy. You will not be able to drive.       You cannot take public transportation, ride share services, or non-medical taxi service without a responsible caregiver.  Medical transport services are allowed with the requirement that a responsible caregiver will receive you at your destination.  We require that drivers and caregivers are confirmed prior to your procedure.

## 2025-03-25 ENCOUNTER — E-VISIT (OUTPATIENT)
Dept: FAMILY MEDICINE | Facility: CLINIC | Age: 60
End: 2025-03-25
Payer: COMMERCIAL

## 2025-03-25 DIAGNOSIS — J01.90 ACUTE SINUSITIS WITH SYMPTOMS > 10 DAYS: Primary | ICD-10-CM

## 2025-04-28 ENCOUNTER — ANESTHESIA EVENT (OUTPATIENT)
Dept: GASTROENTEROLOGY | Facility: CLINIC | Age: 60
End: 2025-04-28
Payer: COMMERCIAL

## 2025-04-28 NOTE — ANESTHESIA PREPROCEDURE EVALUATION
Anesthesia Pre-Procedure Evaluation    Patient: Brenna Fournier   MRN: [id number] : 1965        Procedure : Procedure(s):  Colonoscopy          Past Medical History:   Diagnosis Date    Cystic acne     Elevated blood pressure reading without diagnosis of hypertension 2019    Jaw pain     Localized pain of knee joint     Obesity       Past Surgical History:   Procedure Laterality Date    COLONOSCOPY  2020    DECOMPRESSION LUMBAR ONE LEVEL N/A 2022    Procedure: Open lumbar decompression, L3-L4 ;  Surgeon: Roel Holley MD;  Location: [facility] OR    DECOMPRESSION LUMBAR TWO LEVELS N/A 2022    Procedure: Lumbar 2-Lumbar 3, Lumbar 3-Lumbar 4, Lumbar Decompression;  Surgeon: Roel Holley MD;  Location:  OR     KNEE SCOPE, DIAGNOSTIC      Description: Arthroscopy Knee Right;  Recorded: 2012;    HC SHLDR ARTHROSCOP,SURG,W/REMOVAL,LOOSE/FB      Description: Shoulder Arthroscopy;  Recorded: 2012;    JOINT REPLACEMENT      THYROIDECTOMY, PARTIAL Left 2019    Procedure: LEFT THYROID LOBECTOMY;  Surgeon: Santosh Doe MD;  Location: [facility];  Service: ENT    TRANSPLANT      Knee Replacement      Allergies   Allergen Reactions    Gadolinium-Containing Contrast Media [Gadolinium Derivatives] Anaphylaxis and Itching    Diatrizoate Meglumine [Diatrizoate] Itching     5 minutes post CT injection, patient noted her eyes and ears were extremely itchy    Esomeprazole Sodium [Esomeprazole] Unknown     Swelling    Gluten [Gluten Meal] Itching    Hydrocodone-Acetaminophen Unknown     Hyperactivity, itching    Latex Blisters    Omeprazole Unknown     Swelling- ankles      Social History     Tobacco Use    Smoking status: Every Day     Current packs/day: 0.50     Average packs/day: 0.5 packs/day for 25.0 years (12.5 ttl pk-yrs)     Types: Cigarettes    Smokeless tobacco: Never    Tobacco comments:     Have stopped off and on   Substance Use Topics     "Alcohol use: Yes     Comment: 10 drinks per week      Wt Readings from Last 1 Encounters:   09/27/24 95.3 kg (210 lb)        Anesthesia Evaluation            ROS/MED HX  ENT/Pulmonary:       Neurologic:       Cardiovascular:       METS/Exercise Tolerance: >4 METS    Hematologic:       Musculoskeletal:       GI/Hepatic:     (+)      hiatal hernia,              Renal/Genitourinary:       Endo:     (+)          thyroid problem,     Obesity,       Psychiatric/Substance Use:       Infectious Disease:       Malignancy:       Other:            Physical Exam    Airway  airway exam normal      Mallampati: II   TM distance: > 3 FB   Neck ROM: full   Mouth opening: > 3 cm    Respiratory Devices and Support         Dental  no notable dental history     (+) Minor Abnormalities - some fillings, tiny chips      Cardiovascular   cardiovascular exam normal          Pulmonary   pulmonary exam normal                OUTSIDE LABS:  CBC:   Lab Results   Component Value Date    WBC 6.4 11/02/2022    WBC 7.0 05/17/2022    HGB 15.0 11/02/2022    HGB 16.1 (H) 05/17/2022    HCT 44.0 11/02/2022    HCT 48.0 (H) 05/17/2022     11/02/2022     05/17/2022     BMP:   Lab Results   Component Value Date     09/27/2024     07/28/2023    POTASSIUM 4.1 09/27/2024    POTASSIUM 4.3 07/28/2023    CHLORIDE 105 09/27/2024    CHLORIDE 103 07/28/2023    CO2 26 09/27/2024    CO2 27 07/28/2023    BUN 19.5 09/27/2024    BUN 18.7 07/28/2023    CR 0.94 09/27/2024    CR 0.98 (H) 07/28/2023    GLC 80 09/27/2024    GLC 97 07/28/2023     COAGS:   Lab Results   Component Value Date    PTT 31 05/17/2022    INR 1.03 05/17/2022     POC: No results found for: \"BGM\", \"HCG\", \"HCGS\"  HEPATIC:   Lab Results   Component Value Date    ALBUMIN 4.2 10/25/2024    PROTTOTAL 7.1 10/25/2024    ALT 41 10/25/2024    AST 31 10/25/2024    ALKPHOS 85 10/25/2024    BILITOTAL 0.7 10/25/2024     OTHER:   Lab Results   Component Value Date    A1C 5.2 07/05/2018    MEGA " 8.8 09/27/2024    MAG 2.2 09/27/2024    LIPASE 19 08/07/2020    TSH 2.97 08/16/2024    T4 0.99 07/28/2023    T3 102 07/28/2023       Anesthesia Plan    ASA Status:  2    NPO Status:  NPO Appropriate    Anesthesia Type: General.   Induction: Propofol, Intravenous.   Maintenance: TIVA.        Consents    Anesthesia Plan(s) and associated risks, benefits, and realistic alternatives discussed. Questions answered and patient/representative(s) expressed understanding.     - Discussed: Risks, Benefits and Alternatives for BOTH SEDATION and the PROCEDURE were discussed     - Discussed with:  Patient            Postoperative Care    Pain management: Multi-modal analgesia, IV analgesics, Oral pain medications.   PONV prophylaxis: Ondansetron (or other 5HT-3), Dexamethasone or Solumedrol, Background Propofol Infusion     Comments:               CITLALLI Schulte CRNA    Clinically Significant Risk Factors Present on Admission

## 2025-04-29 ENCOUNTER — ANESTHESIA (OUTPATIENT)
Dept: GASTROENTEROLOGY | Facility: CLINIC | Age: 60
End: 2025-04-29
Payer: COMMERCIAL

## 2025-04-29 ENCOUNTER — HOSPITAL ENCOUNTER (OUTPATIENT)
Facility: CLINIC | Age: 60
Discharge: HOME | End: 2025-04-29
Attending: STUDENT IN AN ORGANIZED HEALTH CARE EDUCATION/TRAINING PROGRAM | Admitting: STUDENT IN AN ORGANIZED HEALTH CARE EDUCATION/TRAINING PROGRAM
Payer: COMMERCIAL

## 2025-04-29 VITALS
HEIGHT: 68 IN | HEART RATE: 70 BPM | TEMPERATURE: 98.8 F | RESPIRATION RATE: 16 BRPM | SYSTOLIC BLOOD PRESSURE: 129 MMHG | WEIGHT: 201 LBS | OXYGEN SATURATION: 99 % | DIASTOLIC BLOOD PRESSURE: 79 MMHG | BODY MASS INDEX: 30.46 KG/M2

## 2025-04-29 LAB — COLONOSCOPY: NORMAL

## 2025-04-29 PROCEDURE — 88305 TISSUE EXAM BY PATHOLOGIST: CPT | Mod: 26 | Performed by: PATHOLOGY

## 2025-04-29 PROCEDURE — 45385 COLONOSCOPY W/LESION REMOVAL: CPT | Performed by: STUDENT IN AN ORGANIZED HEALTH CARE EDUCATION/TRAINING PROGRAM

## 2025-04-29 PROCEDURE — 250N000009 HC RX 250: Performed by: NURSE ANESTHETIST, CERTIFIED REGISTERED

## 2025-04-29 PROCEDURE — 370N000017 HC ANESTHESIA TECHNICAL FEE, PER MIN: Performed by: STUDENT IN AN ORGANIZED HEALTH CARE EDUCATION/TRAINING PROGRAM

## 2025-04-29 PROCEDURE — 45380 COLONOSCOPY AND BIOPSY: CPT | Performed by: STUDENT IN AN ORGANIZED HEALTH CARE EDUCATION/TRAINING PROGRAM

## 2025-04-29 PROCEDURE — 88305 TISSUE EXAM BY PATHOLOGIST: CPT | Mod: TC | Performed by: STUDENT IN AN ORGANIZED HEALTH CARE EDUCATION/TRAINING PROGRAM

## 2025-04-29 PROCEDURE — 250N000011 HC RX IP 250 OP 636: Performed by: NURSE ANESTHETIST, CERTIFIED REGISTERED

## 2025-04-29 PROCEDURE — 258N000003 HC RX IP 258 OP 636: Performed by: PHYSICIAN ASSISTANT

## 2025-04-29 RX ORDER — ONDANSETRON 2 MG/ML
INJECTION INTRAMUSCULAR; INTRAVENOUS PRN
Status: DISCONTINUED | OUTPATIENT
Start: 2025-04-29 | End: 2025-04-29

## 2025-04-29 RX ORDER — ONDANSETRON 2 MG/ML
4 INJECTION INTRAMUSCULAR; INTRAVENOUS EVERY 30 MIN PRN
Status: DISCONTINUED | OUTPATIENT
Start: 2025-04-29 | End: 2025-04-29 | Stop reason: HOSPADM

## 2025-04-29 RX ORDER — NALOXONE HYDROCHLORIDE 0.4 MG/ML
0.4 INJECTION, SOLUTION INTRAMUSCULAR; INTRAVENOUS; SUBCUTANEOUS
Status: DISCONTINUED | OUTPATIENT
Start: 2025-04-29 | End: 2025-04-29 | Stop reason: HOSPADM

## 2025-04-29 RX ORDER — NALOXONE HYDROCHLORIDE 0.4 MG/ML
0.2 INJECTION, SOLUTION INTRAMUSCULAR; INTRAVENOUS; SUBCUTANEOUS
Status: DISCONTINUED | OUTPATIENT
Start: 2025-04-29 | End: 2025-04-29 | Stop reason: HOSPADM

## 2025-04-29 RX ORDER — SODIUM CHLORIDE, SODIUM LACTATE, POTASSIUM CHLORIDE, CALCIUM CHLORIDE 600; 310; 30; 20 MG/100ML; MG/100ML; MG/100ML; MG/100ML
INJECTION, SOLUTION INTRAVENOUS CONTINUOUS
Status: DISCONTINUED | OUTPATIENT
Start: 2025-04-29 | End: 2025-04-29 | Stop reason: HOSPADM

## 2025-04-29 RX ORDER — PROPOFOL 10 MG/ML
INJECTION, EMULSION INTRAVENOUS PRN
Status: DISCONTINUED | OUTPATIENT
Start: 2025-04-29 | End: 2025-04-29

## 2025-04-29 RX ORDER — ONDANSETRON 4 MG/1
4 TABLET, ORALLY DISINTEGRATING ORAL EVERY 30 MIN PRN
Status: DISCONTINUED | OUTPATIENT
Start: 2025-04-29 | End: 2025-04-29 | Stop reason: HOSPADM

## 2025-04-29 RX ORDER — LIDOCAINE 40 MG/G
CREAM TOPICAL
Status: DISCONTINUED | OUTPATIENT
Start: 2025-04-29 | End: 2025-04-29 | Stop reason: HOSPADM

## 2025-04-29 RX ORDER — FLUMAZENIL 0.1 MG/ML
0.2 INJECTION, SOLUTION INTRAVENOUS
Status: DISCONTINUED | OUTPATIENT
Start: 2025-04-29 | End: 2025-04-29 | Stop reason: HOSPADM

## 2025-04-29 RX ORDER — NALOXONE HYDROCHLORIDE 0.4 MG/ML
0.1 INJECTION, SOLUTION INTRAMUSCULAR; INTRAVENOUS; SUBCUTANEOUS
Status: DISCONTINUED | OUTPATIENT
Start: 2025-04-29 | End: 2025-04-29 | Stop reason: HOSPADM

## 2025-04-29 RX ORDER — PROPOFOL 10 MG/ML
INJECTION, EMULSION INTRAVENOUS CONTINUOUS PRN
Status: DISCONTINUED | OUTPATIENT
Start: 2025-04-29 | End: 2025-04-29

## 2025-04-29 RX ORDER — FENTANYL CITRATE 50 UG/ML
25 INJECTION, SOLUTION INTRAMUSCULAR; INTRAVENOUS EVERY 5 MIN PRN
Status: DISCONTINUED | OUTPATIENT
Start: 2025-04-29 | End: 2025-04-29 | Stop reason: HOSPADM

## 2025-04-29 RX ORDER — LIDOCAINE HYDROCHLORIDE 20 MG/ML
INJECTION, SOLUTION INFILTRATION; PERINEURAL PRN
Status: DISCONTINUED | OUTPATIENT
Start: 2025-04-29 | End: 2025-04-29

## 2025-04-29 RX ORDER — DEXAMETHASONE SODIUM PHOSPHATE 4 MG/ML
4 INJECTION, SOLUTION INTRA-ARTICULAR; INTRALESIONAL; INTRAMUSCULAR; INTRAVENOUS; SOFT TISSUE
Status: DISCONTINUED | OUTPATIENT
Start: 2025-04-29 | End: 2025-04-29 | Stop reason: HOSPADM

## 2025-04-29 RX ADMIN — PROPOFOL 70 MG: 10 INJECTION, EMULSION INTRAVENOUS at 07:56

## 2025-04-29 RX ADMIN — LIDOCAINE HYDROCHLORIDE 60 MG: 20 INJECTION, SOLUTION INFILTRATION; PERINEURAL at 07:33

## 2025-04-29 RX ADMIN — ONDANSETRON 4 MG: 2 INJECTION INTRAMUSCULAR; INTRAVENOUS at 07:53

## 2025-04-29 RX ADMIN — PROPOFOL 70 MG: 10 INJECTION, EMULSION INTRAVENOUS at 07:33

## 2025-04-29 RX ADMIN — SODIUM CHLORIDE, POTASSIUM CHLORIDE, SODIUM LACTATE AND CALCIUM CHLORIDE: 600; 310; 30; 20 INJECTION, SOLUTION INTRAVENOUS at 06:51

## 2025-04-29 RX ADMIN — PROPOFOL 200 MCG/KG/MIN: 10 INJECTION, EMULSION INTRAVENOUS at 07:33

## 2025-04-29 ASSESSMENT — ACTIVITIES OF DAILY LIVING (ADL)
ADLS_ACUITY_SCORE: 53
ADLS_ACUITY_SCORE: 53

## 2025-04-29 NOTE — H&P
[facility]    Pre-Endoscopy History and Physical     Brenna Fournier MRN# [id number]   YOB: 1965 Age: 60 year old     Date of Procedure: 4/29/2025  Primary care provider: Maru Emerson  Type of Endoscopy: Colonoscopy with possible biopsy, possible polypectomy  Reason for Procedure: Surveillance colonoscopy  Type of Anesthesia Anticipated: Conscious Sedation    HPI:    Brenna is a 60 year old female who will be undergoing the above procedure.      A history and physical has been performed. The patient's medications and allergies have been reviewed. The risks and benefits of the procedure and the sedation options and risks were discussed with the patient.  All questions were answered and informed consent was obtained.      She denies a personal or family history of anesthesia complications or bleeding disorders.     Patient presents for surveillance colonoscopy. Last colonoscopy 2020, remarkable for cecal, ascending, transverse, and descending colon polyps. Denies anticoagulation. Fhx of colon CA in father. PSH includes thyroidectomy. ASA class II    Patient Active Problem List   Diagnosis    Thyroid Diffuse Enlargement    Nontoxic Solitary Thyroid Nodule    Joint Pain, Localized In The Knee    Obesity    Irritable bowel syndrome with both constipation and diarrhea    Low calcium levels    Hiatal hernia    History of colonic polyps    Benign neoplasm of ascending colon    Spinal stenosis of lumbar region with neurogenic claudication    Tobacco dependence        Past Medical History:   Diagnosis Date    Cystic acne     Elevated blood pressure reading without diagnosis of hypertension 09/25/2019    Jaw pain     Localized pain of knee joint     Obesity         Past Surgical History:   Procedure Laterality Date    COLONOSCOPY  6/2020    DECOMPRESSION LUMBAR ONE LEVEL N/A 11/22/2022    Procedure: Open lumbar decompression, L3-L4 ;  Surgeon: Roel Holley MD;  Location:   OR    DECOMPRESSION LUMBAR TWO LEVELS N/A 5/24/2022    Procedure: Lumbar 2-Lumbar 3, Lumbar 3-Lumbar 4, Lumbar Decompression;  Surgeon: Roel Holley MD;  Location:  OR     KNEE SCOPE, DIAGNOSTIC      Description: Arthroscopy Knee Right;  Recorded: 11/21/2012;    HC SHLDR ARTHROSCOP,SURG,W/REMOVAL,LOOSE/FB      Description: Shoulder Arthroscopy;  Recorded: 11/21/2012;    JOINT REPLACEMENT      THYROIDECTOMY, PARTIAL Left 12/17/2019    Procedure: LEFT THYROID LOBECTOMY;  Surgeon: Santosh Doe MD;  Location: [facility];  Service: ENT    TRANSPLANT  2013    Knee Replacement       Social History     Tobacco Use    Smoking status: Every Day     Current packs/day: 0.50     Average packs/day: 0.5 packs/day for 25.0 years (12.5 ttl pk-yrs)     Types: Cigarettes    Smokeless tobacco: Never    Tobacco comments:     Have stopped off and on   Substance Use Topics    Alcohol use: Yes     Comment: 10 drinks per week       Family History   Problem Relation Age of Onset    Dementia Mother     Colon Cancer Father 78    Hypertension Father     Heart Disease Other     Cerebrovascular Disease Other        Prior to Admission medications    Medication Sig Start Date End Date Taking? Authorizing Provider   conjugated estrogens (PREMARIN) 0.625 MG/GM vaginal cream Place 0.5 g vaginally twice a week 7/29/24  Yes Modesta Bruno PA-C   levothyroxine (SYNTHROID/LEVOTHROID) 25 MCG tablet TAKE 1 TABLET (25 MCG) BY MOUTH DAILY DUE FOR LABS 10/8/24  Yes Modesta Bruno PA-C   OZEMPIC, 2 MG/DOSE, 8 MG/3ML pen  10/2/23  Yes Reported, Patient       Allergies   Allergen Reactions    Gadolinium-Containing Contrast Media [Gadolinium Derivatives] Anaphylaxis and Itching    Diatrizoate Meglumine [Diatrizoate] Itching     5 minutes post CT injection, patient noted her eyes and ears were extremely itchy    Esomeprazole Sodium [Esomeprazole] Unknown     Swelling    Gluten [Gluten Meal] Itching     "Hydrocodone-Acetaminophen Unknown     Hyperactivity, itching    Latex Blisters    Omeprazole Unknown     Swelling- ankles        REVIEW OF SYSTEMS:   5 point ROS negative except as noted above in HPI, including Gen., Resp., CV, GI &  system review.    PHYSICAL EXAM:   BP (!) 138/92 (BP Location: Left arm)   Pulse 79   Temp 98.8  F (37.1  C) (Oral)   Resp 16   Ht 1.721 m (5' 7.75\")   Wt 91.2 kg (201 lb)   LMP  (LMP Unknown)   SpO2 97%   BMI 30.79 kg/m   Estimated body mass index is 30.79 kg/m  as calculated from the following:    Height as of this encounter: 1.721 m (5' 7.75\").    Weight as of this encounter: 91.2 kg (201 lb).   Constitutional: Awake, alert, no acute distress.  Eyes: No scleral icterus.  Conjunctiva are without injection.  ENMT: Mucous membranes moist, dentition and gums are intact.   Neck: Soft, supple, trachea midline.    Endocrine: n/a   Lymphatic: There is no cervical, submandibularadenopathy.  Respiratory: normal efforts on room air  Cardiovascular: extremities warm and well perfused  Abdomen: Non-distended, non-tender,  No masses,  Musculoskeletal: Full range of motion in the upper and lower extremities.    Skin: No skin rashes or lesions to inspection.  No petechia.    Neurologic: alerted and oriented 3x  Psychiatric: The patient's affect is not blunted and mood is appropriate.  DIAGNOSTICS:    Not indicated    IMPRESSION   ASA Class 2 - Mild systemic disease    PLAN:   Plan for Colonoscopy with possible biopsy, possible polypectomy. We discussed the risks, benefits and alternatives and the patient wished to proceed.  Patient is cleared for the above procedure.    The above has been forwarded to the consulting provider.    Darius Cox DO, PGY 2 on 4/29/2025 at 7:00 AM  [facility]        "

## 2025-04-29 NOTE — ANESTHESIA CARE TRANSFER NOTE
Patient: Brenna Fournier    Procedure: Procedure(s):  COLONOSCOPY, FLEXIBLE, WITH LESION REMOVAL USING SNARE       Diagnosis: Screen for colon cancer [Z12.11]  Diagnosis Additional Information: No value filed.    Anesthesia Type:   General     Note:    Oropharynx: oropharynx clear of all foreign objects and spontaneously breathing  Level of Consciousness: awake  Oxygen Supplementation: room air    Independent Airway: airway patency satisfactory and stable  Dentition: dentition unchanged  Vital Signs Stable: post-procedure vital signs reviewed and stable  Report to RN Given: handoff report given  Patient transferred to: Phase II    Handoff Report: Identifed the Patient, Identified the Reponsible Provider, Reviewed the pertinent medical history, Discussed the surgical course, Reviewed Intra-OP anesthesia mangement and issues during anesthesia, Set expectations for post-procedure period and Allowed opportunity for questions and acknowledgement of understanding      Vitals:  Vitals Value Taken Time   /90 04/29/25 0808   Temp     Pulse 77 04/29/25 0808   Resp     SpO2 98 % 04/29/25 0810   Vitals shown include unfiled device data.    Electronically Signed By: CITLALLI Luna CRNA  April 29, 2025  8:11 AM

## 2025-04-29 NOTE — ANESTHESIA POSTPROCEDURE EVALUATION
Patient: Brenna Fournier    Procedure: Procedure(s):  COLONOSCOPY, FLEXIBLE, WITH LESION REMOVAL USING SNARE       Anesthesia Type:  General    Note:  Disposition: Outpatient   Postop Pain Control: Uneventful            Sign Out: Well controlled pain   PONV: No   Neuro/Psych: Uneventful            Sign Out: Acceptable/Baseline neuro status   Airway/Respiratory: Uneventful            Sign Out: Acceptable/Baseline resp. status   CV/Hemodynamics: Uneventful            Sign Out: Acceptable CV status; No obvious hypovolemia; No obvious fluid overload   Other NRE: NONE   DID A NON-ROUTINE EVENT OCCUR? No           Last vitals:  Vitals:    04/29/25 0622   BP: (!) 138/92   Pulse: 79   Resp: 16   Temp: 37.1  C (98.8  F)   SpO2: 97%       Electronically Signed By: CITLALLI Luna CRNA  April 29, 2025  8:11 AM

## 2025-04-30 LAB
PATH REPORT.COMMENTS IMP SPEC: NORMAL
PATH REPORT.COMMENTS IMP SPEC: NORMAL
PATH REPORT.FINAL DX SPEC: NORMAL
PATH REPORT.GROSS SPEC: NORMAL
PATH REPORT.MICROSCOPIC SPEC OTHER STN: NORMAL
PATH REPORT.RELEVANT HX SPEC: NORMAL
PHOTO IMAGE: NORMAL

## 2025-06-24 ENCOUNTER — OFFICE VISIT (OUTPATIENT)
Dept: FAMILY MEDICINE | Facility: CLINIC | Age: 60
End: 2025-06-24
Payer: COMMERCIAL

## 2025-06-24 VITALS
DIASTOLIC BLOOD PRESSURE: 84 MMHG | SYSTOLIC BLOOD PRESSURE: 136 MMHG | WEIGHT: 215 LBS | TEMPERATURE: 98.5 F | HEART RATE: 79 BPM | RESPIRATION RATE: 18 BRPM | HEIGHT: 68 IN | OXYGEN SATURATION: 98 % | BODY MASS INDEX: 32.58 KG/M2
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
## 2025-06-24 PROCEDURE — 99213 OFFICE O/P EST LOW 20 MIN: CPT | Performed by: PHYSICIAN ASSISTANT

## 2025-06-24 PROCEDURE — 3075F SYST BP GE 130 - 139MM HG: CPT | Performed by: PHYSICIAN ASSISTANT

## 2025-06-24 PROCEDURE — 3079F DIAST BP 80-89 MM HG: CPT | Performed by: PHYSICIAN ASSISTANT

## 2025-06-24 RX ORDER — TRIAMCINOLONE ACETONIDE 1 MG/G
OINTMENT TOPICAL
COMMUNITY
Start: 2025-06-21 | End: 2025-06-28

## 2025-06-24 RX ORDER — CEPHALEXIN 500 MG/1
500 CAPSULE ORAL
COMMUNITY
Start: 2025-06-21 | End: 2025-06-28

## 2025-06-24 NOTE — PROGRESS NOTES
"  Assessment & Plan     (W57.XXXD) Bug bite, subsequent encounter  (primary encounter diagnosis)  Comment:   - Woke up on 6/20 with bug bite to L posterior calf with swelling, redness, pain.   - Denies fevers, myalgias, fatigue  - Well defined area without spreading of erythema, central puncture alvino  - Was seen in  on 6/21 and treated with triamcinolone ointment and Keflex to use if cellulitis became apparent  - She will continue to use steroid ointment and monitor area with a black marker and will notify if area is spreading  - She did take one dose of Keflex, but will hold off as area does not appear infected    BMI  Estimated body mass index is 32.93 kg/m  as calculated from the following:    Height as of this encounter: 1.721 m (5' 7.75\").    Weight as of this encounter: 97.5 kg (215 lb).   Weight management plan: Discussed healthy diet and exercise guidelines      Renetta Ceja is a 60 year old, presenting for the following health issues:  Insect Bites        6/24/2025     2:20 PM   Additional Questions   Roomed by ELIZABETH Martinez     History of Present Illness       Reason for visit:  Bug bite reaction  Symptoms include:  Swelling, bruising and pain  Symptom progression:  Staying the same  Had these symptoms before:  Yes  Has tried/received treatment for these symptoms:  No  What makes it worse:  Touching it  What makes it better:  No   She is taking medications regularly.      - On 6/20, was at cabin in [place] and woke up from bed with area of redness, swelling, and pain on back of L calf. Area was well defined and circular in pattern. She noticed one puncture alvino without evidence of foreign body. She was seen at  on 6/21 and was treated with triamcinolone ointment to help with swelling and given Keflex to use if area of redness began to expand. She denies streaking, increased redness. Swelling has improved with steroid ointment. No necrotic tissue. Denies fevers, myalgias.   - She does typically react more " "to different bug bites, but this is the worst it's been.     Review of Systems  Constitutional, HEENT, cardiovascular, pulmonary, GI, , musculoskeletal, neuro, skin, endocrine and psych systems are negative, except as otherwise noted.      Objective    /84   Pulse 79   Temp 98.5  F (36.9  C) (Tympanic)   Resp 18   Ht 1.721 m (5' 7.75\")   Wt 97.5 kg (215 lb)   LMP  (LMP Unknown)   SpO2 98%   BMI 32.93 kg/m    Body mass index is 32.93 kg/m .  Physical Exam   GENERAL: alert and no distress  EYES: Eyes grossly normal to inspection, PERRL and conjunctivae and sclerae normal  HENT: ear canals and TM's normal, nose and mouth without ulcers or lesions  NECK: no adenopathy, no asymmetry, masses, or scars  RESP: lungs clear to auscultation - no rales, rhonchi or wheezes  CV: regular rate and rhythm, normal S1 S2, no S3 or S4, no murmur, click or rub, no peripheral edema  ABDOMEN: soft, nontender, no hepatosplenomegaly, no masses and bowel sounds normal  MS: no gross musculoskeletal defects noted, no edema  SKIN: no suspicious lesions or rashes. Left posterior calf with well-defined area (approximately 2.5-3inches in diameter) of erythema with darker areas of resorption. Central puncture alvino without necrotic tissue. No drainage from area. No evidence of deeper muscular involvement.   NEURO: Normal strength and tone, mentation intact and speech normal  PSYCH: mentation appears normal, affect normal/bright        ANTONIA Mccrary; 6/24/2025    Signed Electronically by: Modesta Bruno PA-C    "

## 2025-07-16 ENCOUNTER — PATIENT OUTREACH (OUTPATIENT)
Dept: GASTROENTEROLOGY | Facility: CLINIC | Age: 60
End: 2025-07-16
Payer: COMMERCIAL

## 2025-08-14 ENCOUNTER — LAB (OUTPATIENT)
Dept: LAB | Facility: CLINIC | Age: 60
End: 2025-08-14
Payer: COMMERCIAL

## 2025-08-14 DIAGNOSIS — R03.0 ELEVATED BLOOD PRESSURE READING WITHOUT DIAGNOSIS OF HYPERTENSION: ICD-10-CM

## 2025-08-14 LAB
ANION GAP SERPL CALCULATED.3IONS-SCNC: 12 MMOL/L (ref 7–15)
BUN SERPL-MCNC: 19.9 MG/DL (ref 8–23)
CALCIUM SERPL-MCNC: 8.4 MG/DL (ref 8.8–10.4)
CHLORIDE SERPL-SCNC: 101 MMOL/L (ref 98–107)
CREAT SERPL-MCNC: 0.84 MG/DL (ref 0.51–0.95)
EGFRCR SERPLBLD CKD-EPI 2021: 78 ML/MIN/1.73M2
GLUCOSE SERPL-MCNC: 104 MG/DL (ref 70–99)
HCO3 SERPL-SCNC: 23 MMOL/L (ref 22–29)
POTASSIUM SERPL-SCNC: 4.2 MMOL/L (ref 3.4–5.3)
SODIUM SERPL-SCNC: 136 MMOL/L (ref 135–145)
TSH SERPL DL<=0.005 MIU/L-ACNC: 4.06 UIU/ML (ref 0.3–4.2)

## 2025-08-19 ENCOUNTER — ORDERS ONLY (AUTO-RELEASED) (OUTPATIENT)
Dept: FAMILY MEDICINE | Facility: CLINIC | Age: 60
End: 2025-08-19

## 2025-08-19 ENCOUNTER — OFFICE VISIT (OUTPATIENT)
Dept: FAMILY MEDICINE | Facility: CLINIC | Age: 60
End: 2025-08-19
Payer: COMMERCIAL

## 2025-08-19 VITALS
HEART RATE: 77 BPM | RESPIRATION RATE: 16 BRPM | BODY MASS INDEX: 33.04 KG/M2 | WEIGHT: 218 LBS | DIASTOLIC BLOOD PRESSURE: 82 MMHG | SYSTOLIC BLOOD PRESSURE: 128 MMHG | TEMPERATURE: 97.8 F | HEIGHT: 68 IN | OXYGEN SATURATION: 98 %

## 2025-08-19 DIAGNOSIS — R00.2 PALPITATIONS: Primary | ICD-10-CM

## 2025-08-19 DIAGNOSIS — R07.9 CHEST PAIN, UNSPECIFIED TYPE: ICD-10-CM

## 2025-08-19 DIAGNOSIS — R00.2 PALPITATIONS: ICD-10-CM

## 2025-08-19 DIAGNOSIS — I88.9 LYMPHADENITIS: ICD-10-CM

## 2025-08-19 DIAGNOSIS — R22.1 NECK FULLNESS: ICD-10-CM

## 2025-08-19 PROCEDURE — 99214 OFFICE O/P EST MOD 30 MIN: CPT | Performed by: PHYSICIAN ASSISTANT

## 2025-08-19 PROCEDURE — 3074F SYST BP LT 130 MM HG: CPT | Performed by: PHYSICIAN ASSISTANT

## 2025-08-19 PROCEDURE — 3079F DIAST BP 80-89 MM HG: CPT | Performed by: PHYSICIAN ASSISTANT

## 2025-08-20 ENCOUNTER — HOSPITAL ENCOUNTER (OUTPATIENT)
Dept: ULTRASOUND IMAGING | Facility: CLINIC | Age: 60
Discharge: HOME | End: 2025-08-20
Attending: PHYSICIAN ASSISTANT
Payer: COMMERCIAL

## 2025-08-20 DIAGNOSIS — R22.1 NECK FULLNESS: ICD-10-CM

## 2025-08-20 DIAGNOSIS — R00.2 PALPITATIONS: ICD-10-CM

## 2025-08-20 PROCEDURE — 93880 EXTRACRANIAL BILAT STUDY: CPT

## 2025-09-03 LAB — CV ZIO PRELIM RESULTS: NORMAL

## (undated) DEVICE — SPONGE SURGIFOAM 100 1974

## (undated) DEVICE — ESU CLEANER TIP 31142717

## (undated) DEVICE — LINEN ORTHO ACL PACK 5447

## (undated) DEVICE — ESU GROUND PAD ADULT W/CORD E7507

## (undated) DEVICE — DRSG STERI STRIP 1/2X4" R1547

## (undated) DEVICE — PAD PROAXIS TABLE KIT SPK10182

## (undated) DEVICE — GLOVE PROTEXIS W/NEU-THERA 8.0  2D73TE80

## (undated) DEVICE — DRAPE X-RAY TUBE 00-901169-01-OEC

## (undated) DEVICE — DECANTER BAG 2002S

## (undated) DEVICE — SU VICRYL 3-0 PS-2 18" UND J497H

## (undated) DEVICE — SU VICRYL 1 CT-2 27" J335H

## (undated) DEVICE — SU ETHILON 3-0 PS-1 18" 1663H

## (undated) DEVICE — PEN MARKING SKIN W/LABELS 31145884

## (undated) DEVICE — GLOVE PROTEXIS W/NEU-THERA 7.5  2D73TE75

## (undated) DEVICE — DRSG AQUACEL AG 3.5X6.0" HYDROFIBER 412010

## (undated) DEVICE — SU VICRYL 2-0 CT-2 27" UND J269H

## (undated) DEVICE — DRAPE MAYO STAND 23X54 8337

## (undated) DEVICE — SOL NACL 0.9% IRRIG 1000ML BOTTLE 07138-09

## (undated) DEVICE — DRAIN HEMOVAC RESERVOIR KIT 10FR 1/8" MED 00-2550-002-10

## (undated) DEVICE — GLOVE PROTEXIS BLUE W/NEU-THERA 7.5  2D73EB75

## (undated) DEVICE — SUCTION FRAZIER 12FR W/OBTURATOR 33120

## (undated) DEVICE — RX SURGIFLO HEMOSTATIC MATRIX 8ML 2991

## (undated) DEVICE — CUSHION INSERT LG PRONE VIEW JACKSON TABLE

## (undated) DEVICE — ADH SKIN CLOSURE PREMIERPRO EXOFIN 1.0ML 3470

## (undated) DEVICE — ESU ELEC BLADE 4" COATED

## (undated) DEVICE — BONE WAX 2.5GM W31G

## (undated) DEVICE — TOOL DISSECT MIDAS MR8 14CM MATCH HEAD 3MM MR8-14MH30

## (undated) DEVICE — GLOVE BIOGEL PI MICRO SZ 7.5 48575

## (undated) DEVICE — SOL WATER IRRIG 1000ML BOTTLE 2F7114

## (undated) DEVICE — SPONGE COTTONOID 1/2X1/2" 80-1400

## (undated) DEVICE — GLOVE BIOGEL PI MICRO INDICATOR UNDERGLOVE SZ 8.0 48980

## (undated) DEVICE — SPONGE COTTONOID 1/2X1" 80-1402

## (undated) DEVICE — DRAPE COVER C-ARM SEAMLESS SNAP-KAP 03-KP26 LATEX FREE

## (undated) DEVICE — GLOVE BIOGEL PI MICRO SZ 8.0 48580

## (undated) DEVICE — PACK SMALL SPINE RIDGES

## (undated) DEVICE — SYR 10ML LL W/O NDL 302995

## (undated) DEVICE — GLOVE BIOGEL PI MICRO INDICATOR UNDERGLOVE SZ 7.5 48975

## (undated) DEVICE — ENDO SNARE EXACTO COLD 9MM LOOP 2.4MMX230CM 00711115

## (undated) DEVICE — LINEN POUCH DBL 5427

## (undated) DEVICE — DECANTER VIAL 2006S

## (undated) DEVICE — TUBING SUCTION MEDI-VAC SOFT 3/16"X20' N520A

## (undated) DEVICE — GLOVE PROTEXIS BLUE W/NEU-THERA 8.0  2D73EB80

## (undated) RX ORDER — PROPOFOL 10 MG/ML
INJECTION, EMULSION INTRAVENOUS
Status: DISPENSED
Start: 2022-05-24

## (undated) RX ORDER — VANCOMYCIN HYDROCHLORIDE 1 G/20ML
INJECTION, POWDER, LYOPHILIZED, FOR SOLUTION INTRAVENOUS
Status: DISPENSED
Start: 2022-05-24

## (undated) RX ORDER — OXYCODONE HYDROCHLORIDE 5 MG/1
TABLET ORAL
Status: DISPENSED
Start: 2022-05-24

## (undated) RX ORDER — VANCOMYCIN HYDROCHLORIDE 1 G/20ML
INJECTION, POWDER, LYOPHILIZED, FOR SOLUTION INTRAVENOUS
Status: DISPENSED
Start: 2022-11-22

## (undated) RX ORDER — SCOLOPAMINE TRANSDERMAL SYSTEM 1 MG/1
PATCH, EXTENDED RELEASE TRANSDERMAL
Status: DISPENSED
Start: 2022-05-24

## (undated) RX ORDER — OXYCODONE HYDROCHLORIDE 5 MG/1
TABLET ORAL
Status: DISPENSED
Start: 2022-11-22

## (undated) RX ORDER — GLYCOPYRROLATE 0.2 MG/ML
INJECTION INTRAMUSCULAR; INTRAVENOUS
Status: DISPENSED
Start: 2022-05-24

## (undated) RX ORDER — NEOSTIGMINE METHYLSULFATE 1 MG/ML
VIAL (ML) INJECTION
Status: DISPENSED
Start: 2022-05-24

## (undated) RX ORDER — BUPIVACAINE HYDROCHLORIDE AND EPINEPHRINE 2.5; 5 MG/ML; UG/ML
INJECTION, SOLUTION EPIDURAL; INFILTRATION; INTRACAUDAL; PERINEURAL
Status: DISPENSED
Start: 2022-05-24

## (undated) RX ORDER — ONDANSETRON 2 MG/ML
INJECTION INTRAMUSCULAR; INTRAVENOUS
Status: DISPENSED
Start: 2022-11-22

## (undated) RX ORDER — DEXAMETHASONE SODIUM PHOSPHATE 4 MG/ML
INJECTION, SOLUTION INTRA-ARTICULAR; INTRALESIONAL; INTRAMUSCULAR; INTRAVENOUS; SOFT TISSUE
Status: DISPENSED
Start: 2022-05-24

## (undated) RX ORDER — PROPOFOL 10 MG/ML
INJECTION, EMULSION INTRAVENOUS
Status: DISPENSED
Start: 2022-11-22

## (undated) RX ORDER — CEFAZOLIN SODIUM/WATER 2 G/20 ML
SYRINGE (ML) INTRAVENOUS
Status: DISPENSED
Start: 2022-11-22

## (undated) RX ORDER — FENTANYL CITRATE 50 UG/ML
INJECTION, SOLUTION INTRAMUSCULAR; INTRAVENOUS
Status: DISPENSED
Start: 2022-05-24

## (undated) RX ORDER — METHOCARBAMOL 750 MG/1
TABLET, FILM COATED ORAL
Status: DISPENSED
Start: 2022-11-22

## (undated) RX ORDER — GABAPENTIN 100 MG/1
CAPSULE ORAL
Status: DISPENSED
Start: 2022-11-22

## (undated) RX ORDER — LIDOCAINE HYDROCHLORIDE 10 MG/ML
INJECTION, SOLUTION EPIDURAL; INFILTRATION; INTRACAUDAL; PERINEURAL
Status: DISPENSED
Start: 2022-11-22

## (undated) RX ORDER — FENTANYL CITRATE-0.9 % NACL/PF 10 MCG/ML
PLASTIC BAG, INJECTION (ML) INTRAVENOUS
Status: DISPENSED
Start: 2022-11-22

## (undated) RX ORDER — GLYCOPYRROLATE 0.2 MG/ML
INJECTION INTRAMUSCULAR; INTRAVENOUS
Status: DISPENSED
Start: 2022-11-22

## (undated) RX ORDER — GABAPENTIN 100 MG/1
CAPSULE ORAL
Status: DISPENSED
Start: 2022-05-24

## (undated) RX ORDER — ONDANSETRON 2 MG/ML
INJECTION INTRAMUSCULAR; INTRAVENOUS
Status: DISPENSED
Start: 2022-05-24

## (undated) RX ORDER — METHOCARBAMOL 750 MG/1
TABLET, FILM COATED ORAL
Status: DISPENSED
Start: 2022-05-24

## (undated) RX ORDER — FENTANYL CITRATE-0.9 % NACL/PF 10 MCG/ML
PLASTIC BAG, INJECTION (ML) INTRAVENOUS
Status: DISPENSED
Start: 2022-05-24

## (undated) RX ORDER — LIDOCAINE HYDROCHLORIDE 10 MG/ML
INJECTION, SOLUTION EPIDURAL; INFILTRATION; INTRACAUDAL; PERINEURAL
Status: DISPENSED
Start: 2022-05-24

## (undated) RX ORDER — FENTANYL CITRATE 50 UG/ML
INJECTION, SOLUTION INTRAMUSCULAR; INTRAVENOUS
Status: DISPENSED
Start: 2022-11-22

## (undated) RX ORDER — BUPIVACAINE HYDROCHLORIDE AND EPINEPHRINE 2.5; 5 MG/ML; UG/ML
INJECTION, SOLUTION EPIDURAL; INFILTRATION; INTRACAUDAL; PERINEURAL
Status: DISPENSED
Start: 2022-11-22

## (undated) RX ORDER — DEXAMETHASONE SODIUM PHOSPHATE 4 MG/ML
INJECTION, SOLUTION INTRA-ARTICULAR; INTRALESIONAL; INTRAMUSCULAR; INTRAVENOUS; SOFT TISSUE
Status: DISPENSED
Start: 2022-11-22

## (undated) RX ORDER — NEOSTIGMINE METHYLSULFATE 1 MG/ML
VIAL (ML) INJECTION
Status: DISPENSED
Start: 2022-11-22

## (undated) RX ORDER — CEFAZOLIN SODIUM/WATER 2 G/20 ML
SYRINGE (ML) INTRAVENOUS
Status: DISPENSED
Start: 2022-05-24